# Patient Record
Sex: FEMALE | Race: WHITE | Employment: OTHER | URBAN - METROPOLITAN AREA
[De-identification: names, ages, dates, MRNs, and addresses within clinical notes are randomized per-mention and may not be internally consistent; named-entity substitution may affect disease eponyms.]

---

## 2017-01-20 ENCOUNTER — TELEPHONE (OUTPATIENT)
Dept: FAMILY MEDICINE | Facility: OTHER | Age: 77
End: 2017-01-20

## 2017-01-20 DIAGNOSIS — G89.29 OTHER CHRONIC PAIN: Primary | ICD-10-CM

## 2017-01-20 RX ORDER — OXYCODONE AND ACETAMINOPHEN 7.5; 325 MG/1; MG/1
1 TABLET ORAL EVERY 6 HOURS PRN
Qty: 120 TABLET | Refills: 0 | Status: SHIPPED | OUTPATIENT
Start: 2017-03-28 | End: 2017-04-21

## 2017-01-20 RX ORDER — OXYCODONE AND ACETAMINOPHEN 7.5; 325 MG/1; MG/1
1 TABLET ORAL EVERY 6 HOURS PRN
Qty: 120 TABLET | Refills: 0 | Status: SHIPPED | OUTPATIENT
Start: 2017-02-25 | End: 2017-03-27

## 2017-01-20 RX ORDER — MORPHINE SULFATE 30 MG/1
30 TABLET, FILM COATED, EXTENDED RELEASE ORAL 2 TIMES DAILY
Qty: 60 TABLET | Refills: 0 | Status: SHIPPED | OUTPATIENT
Start: 2017-02-25 | End: 2017-03-27

## 2017-01-20 RX ORDER — MORPHINE SULFATE 30 MG/1
30 TABLET, FILM COATED, EXTENDED RELEASE ORAL 2 TIMES DAILY
Qty: 60 TABLET | Refills: 0 | Status: SHIPPED | OUTPATIENT
Start: 2017-03-28 | End: 2017-04-21

## 2017-01-20 RX ORDER — MORPHINE SULFATE 30 MG/1
30 TABLET, FILM COATED, EXTENDED RELEASE ORAL 2 TIMES DAILY
Qty: 60 TABLET | Refills: 0 | Status: SHIPPED | OUTPATIENT
Start: 2017-01-25 | End: 2017-02-24

## 2017-01-20 RX ORDER — OXYCODONE AND ACETAMINOPHEN 7.5; 325 MG/1; MG/1
1 TABLET ORAL EVERY 6 HOURS PRN
Qty: 120 TABLET | Refills: 0 | Status: SHIPPED | OUTPATIENT
Start: 2017-01-25 | End: 2017-02-24

## 2017-01-20 NOTE — TELEPHONE ENCOUNTER
Pt was told to call us mid January to give us her winter address so we can mail out her rx to her.  She is at [address] [place] [address].  She needs her morphine sulfate and oxycodone 3 each sent to her. Please call her once this has been mailed so she can keep in eye out for it.

## 2017-01-20 NOTE — TELEPHONE ENCOUNTER
1. Other chronic pain  Secondary to severe scoliosis, has been following with spine specialist, surgery is not recommended, just pain control.  She has been stable on her current narcotic regimen for some time, feels it helps her get through her normal daily activities, no escalating usage.  3 months given.  If she gets home this winter for a few days, she may see me for refill, otherwise, may mail scripts to her until she returns this spring.  Have also asked to her to see if [place] will take out of state provider scripts as some states have stopped accepting them.  If this is the case, she will need a new provider in [place].    Morphine   Last Written Prescription Date:  12-  Last Fill Quantity: 60,   # refills: 0  Last Office Visit with [facility] or [facility] prescribing provider: 10-  Future Office visit:       Routing refill request to provider for review/approval because:  Drug not on the [facility] or [facility] refill protocol or controlled substance    Oxycodone  Last Written Prescription Date:  12-  Last Fill Quantity: 120,   # refills: 0  Last Office Visit with [facility] or [facility] prescribing provider: 10-  Future Office visit:       Routing refill request to provider for review/approval because:  Drug not on the [facility] or [facility] refill protocol or controlled substance

## 2017-01-20 NOTE — TELEPHONE ENCOUNTER
Spoke to patient and informed her that rx will be mailed out to her on Monday 1/23/17 and I confirmed address in [place]- and is correct.

## 2017-02-05 DIAGNOSIS — I10 HYPERTENSION GOAL BP (BLOOD PRESSURE) < 140/90: Primary | ICD-10-CM

## 2017-02-06 NOTE — TELEPHONE ENCOUNTER
Amlodipine 10 MG      Last Written Prescription Date: 05/18/2016  Last Fill Quantity: 90, # refills: 3  Last Office Visit with [facility] or [facility] prescribing provider: 10/25/2016       POTASSIUM   Date Value Ref Range Status   10/25/2016 3.7 3.4 - 5.3 mmol/L Final     CREATININE   Date Value Ref Range Status   10/25/2016 0.74 0.52 - 1.04 mg/dL Final   04/10/2008 50  Final     Comment:     Unit: mg/dL   04/10/2008 50  Final     Comment:     Unit: mg/dL     BP Readings from Last 3 Encounters:   10/25/16 146/60   05/18/16 108/60   10/14/15 120/58

## 2017-02-07 RX ORDER — AMLODIPINE BESYLATE 10 MG/1
TABLET ORAL
Qty: 90 TABLET | Refills: 0 | OUTPATIENT
Start: 2017-02-07

## 2017-02-07 NOTE — TELEPHONE ENCOUNTER
Rx was sent 5/18/2016 for 3 months and 3 refills. Patient should have medication through 5/18/2017  Pharmacy notified via E-prescribe refusal  Ariane Uribe RN

## 2017-03-31 DIAGNOSIS — L30.4 INTERTRIGO: ICD-10-CM

## 2017-03-31 NOTE — TELEPHONE ENCOUNTER
nystatin (MYCOSTATIN) 202590 UNIT/GM POWD      Last Written Prescription Date: 10/14/2015  Last Fill Quantity: 120g,  # refills: 3   Last Office Visit with [facility] or [facility] prescribing provider: 10/25/2016                                             nystatin (MYCOSTATIN) cream      Last Written Prescription Date: 10/14/2015  Last Fill Quantity: 60g,  # refills: 5   Last Office Visit with [facility] or [facility] prescribing provider: 10/25/2016

## 2017-04-04 RX ORDER — NYSTATIN 100000 [USP'U]/G
POWDER TOPICAL
Qty: 120 G | Refills: 0 | Status: SHIPPED | OUTPATIENT
Start: 2017-04-04 | End: 2017-06-13

## 2017-04-04 RX ORDER — NYSTATIN 100000 U/G
CREAM TOPICAL
Qty: 60 G | Refills: 0 | Status: SHIPPED | OUTPATIENT
Start: 2017-04-04 | End: 2017-06-13

## 2017-04-04 NOTE — TELEPHONE ENCOUNTER
Medication(s) reviewed and approved. Rx. was faxed to the designated pharmacy.      Please notify that this has been done.      Please close the encounter when finished with it.     Anastasia Anglin PA-C

## 2017-04-04 NOTE — TELEPHONE ENCOUNTER
Routing refill request to provider for review/approval because:  Script is from 10/2015    Smiley Shannon, RN, BSN

## 2017-04-12 ENCOUNTER — TELEPHONE (OUTPATIENT)
Dept: FAMILY MEDICINE | Facility: OTHER | Age: 77
End: 2017-04-12

## 2017-04-12 NOTE — TELEPHONE ENCOUNTER
Reason for call:  Form  Reason for Call:  Form, our goal is to have forms completed with 72 hours, however, some forms may require a visit or additional information.    Type of letter, form or note:  medical    Who is the form from?: [facility] pharmacy (if other please explain)    Where did the form come from: form was faxed in    What clinic location was the form placed at?: [facility] - [phone]    Where the form was placed: 's Box    What number is listed as a contact on the form?: [phone]       Additional comments: fill out fax back    Call taken on 4/12/2017 at 9:58 AM by Kendra Villafana

## 2017-04-17 ENCOUNTER — TELEPHONE (OUTPATIENT)
Dept: FAMILY MEDICINE | Facility: OTHER | Age: 77
End: 2017-04-17

## 2017-04-17 NOTE — PROGRESS NOTES
SUBJECTIVE:                                                    Katherin Jonas is a 76 year old female who presents to clinic today for the following health issues:    HPI    Foot swelling     Onset: 1 month--5/2016 had swelling issues and amlodipine was decreased to 5 mg.  Had been doing well over winter until last month when flew home and now left leg is more swollen.  Swelling occurs in the morning and goes down during the day.  Went to Urgent Care and had normal US 4/6/2017.  Sleeps in her recliner, finds that swelling is worse when she sleeps in the recliner than if she sleeps in bed.    Description:   Location: left foot swelling  Character: swelling    Intensity: mild, moderate    Progression of Symptoms: same    Accompanying Signs & Symptoms:  Other symptoms: none   History:   Previous similar pain: YES      Precipitating factors:   Trauma or overuse: no     Alleviating factors:  Improved by: elevating legs       Therapies Tried and outcome: none    Hypertension Follow-up      Outpatient blood pressures are not being checked.    Low Salt Diet: not monitoring salt     Chronic Kidney Disease Follow-up      Current NSAID use?  No      Chronic Pain Follow-Up       Type / Location of Pain: back, feet  Analgesia/pain control:       Recent changes:  worse      Overall control: Comfortably manageable  Activity level/function:      Daily activities:  Can do most things most days, with some rest    Work:  not applicable  Adverse effects:  No  Adherance    Taking medication as directed?  Yes    Participating in other treatments: yes  Risk Factors:    Sleep:  Good    Mood/anxiety:  worsened    Recent family or social stressors:   is having health issues    Other aggravating factors: none  PHQ-9 SCORE 4/29/2015 5/18/2016 10/25/2016   Total Score 4 - -   Total Score - 6 0     RACHID-7 SCORE 4/29/2015 5/18/2016 10/25/2016   Total Score 2 - -   Total Score - 3 3     Encounter-Level CSA - 05/18/2016:                  "Controlled Substance Agreement - Scan on 2016  2:21 PM : CONTROLLED SUBSTANCE AGREEMENT (below)                 Problem list and histories reviewed & adjusted, as indicated.  Additional history: as documented    Patient Active Problem List   Diagnosis     Esophageal reflux     Carotid atherosclerosis     CKD (chronic kidney disease) stage 3, GFR 30-59 ml/min     Insomnia     Mild major depression (H)     Advanced directives, counseling/discussion     Hypertension goal BP (blood pressure) < 140/90     Hyperlipidemia, unspecified     Scoliosis     Osteopenia     Postherpetic neuralgia     Other chronic pain     Past Surgical History:   Procedure Laterality Date     C FULL ROUT OBSTE CARE, DELIV  1964     C FULL ROUT OBSTE CARE, DELIV       C FULL ROUT OBSTE CARE, DELIV  1970     C TOTAL KNEE ARTHROPLASTY  1997    left     C TOTAL KNEE ARTHROPLASTY  2005    right     C TREAT ECTOPIC PREG,RMV TUBE/OVARY  1967    left     COLONOSCOPY  11    [facility]     HC REPAIR OF NASAL SEPTUM         Social History   Substance Use Topics     Smoking status: Former Smoker     Quit date: 1979     Smokeless tobacco: Never Used      Comment: no smokers in household     Alcohol use No      Comment: none since      Family History   Problem Relation Age of Onset     CANCER Daughter      Hypertension Mother            ROS:  C: NEGATIVE for fever, chills, change in weight  E/M: NEGATIVE for ear, mouth and throat problems  R: NEGATIVE for significant cough or SOB  CV: NEGATIVE for chest pain, palpitations or peripheral edema    OBJECTIVE:                                                    /56  Pulse 60  Temp 98.5  F (36.9  C) (Temporal)  Resp 16  Ht 4' 10.07\" (1.475 m)  Wt 138 lb (62.6 kg)  LMP 2011  BMI 28.77 kg/m2  Body mass index is 28.77 kg/(m^2).  Gen: no apparent distress  Chest: clear to auscultation without wheeze, rale or rhonchi  Cor: " regular rate and rhythm without murmur  ABDOMEN: soft, nontender, no masses and bowel sounds normal  Ext: warm and dry without edema  Psych: Alert and oriented times 3; coherent speech, normal   rate and volume, able to articulate logical thoughts, able   to abstract reason, no tangential thoughts, no hallucinations   or delusions  Her affect is neutral.  Back:  Significant scoliosis noted        ASSESSMENT/PLAN:                                                      1. Hypertension goal BP (blood pressure) < 140/90  Well controlled, will return for fasting labs.    - **Lipid panel reflex to direct LDL FUTURE anytime; Future  - **Comprehensive metabolic panel FUTURE anytime; Future  - **CBC with platelets FUTURE anytime; Future  - **Albumin Random Urine Quant FUTURE anytime; Future    2. CKD (chronic kidney disease) stage 3, GFR 30-59 ml/min  BP controlled, avoid NSAIDs.    - **Lipid panel reflex to direct LDL FUTURE anytime; Future  - **Comprehensive metabolic panel FUTURE anytime; Future  - **CBC with platelets FUTURE anytime; Future  - **Albumin Random Urine Quant FUTURE anytime; Future    3. Hyperlipidemia, unspecified  Remains on statin.    - **Lipid panel reflex to direct LDL FUTURE anytime; Future  - **Comprehensive metabolic panel FUTURE anytime; Future    4. Other chronic pain  Severe scoliosis, back specialist does not feel she would benefit from further surgery.  She has been on chronic narcotics for many years, had been following with pain clinic and was on high doses of Avinza (120 mg daily) along with Percocet, eventually transferred care to PCP at this clinic as her pain meds were stable and these were continued.  In 2010 it was attempted to decrease her Avinza to 90 mg, but this was then increased back to 120 mg after a couple months.  Later in 2011 she was able to get her Avinza decreased to 75 mg daily, but her Percocet was increased from three times daily to four times daily and then up to 7.5 mg  four times daily.  Due to insurance coverage, Avinza was no longer covered and she was changed to MS Contin 30 mg twice daily in 2013 and her Percocet was increased to 7.5 mg 4 times daily.  She has remained on these doses since then, her pain has been stable, hs e has no evidence of misuse of the medications.  She is due for UDS today.  3 months of scripts were provided, she will follow up in 3 months.    - Drug Abuse Screen Panel 13, Urine (Pain Care Package); Future  - oxyCODONE-acetaminophen (PERCOCET) 7.5-325 MG per tablet; Take 1 tablet by mouth every 6 hours as needed for pain  Dispense: 120 tablet; Refill: 0  - morphine (MS CONTIN) 30 MG 12 hr tablet; Take 1 tablet (30 mg) by mouth 2 times daily  Dispense: 60 tablet; Refill: 0    5. Insomnia, unspecified type  She feels her sleep is doing well on the 3 mg of Lunesta.    - eszopiclone (LUNESTA) 3 MG tablet; Take 1 tablet (3 mg) by mouth nightly as needed for sleep  Dispense: 90 tablet; Refill: 1    6. Essential hypertension with goal blood pressure less than 140/90  Well controlled.  Her edema is absent today.  Encouraged elevating feet and/or wearing compression stockings.    - amLODIPine (NORVASC) 5 MG tablet; Take 1 tablet (5 mg) by mouth every morning  Dispense: 90 tablet; Refill: 3    Silvia Matthews MD  [facility]

## 2017-04-17 NOTE — TELEPHONE ENCOUNTER
"Pt is on the schedule for Friday with TC. \"Left foot swelling up\" is what the appt note says. Please review and call for more info if appropriate.   "

## 2017-04-18 NOTE — TELEPHONE ENCOUNTER
Spoke with patient.  Foot swelling started since last OV.  Change BP meds a little bit.  Worse after her trip from [place] back the 29th, has been sleeping in chair with feet down.  Better looking today.  Was seen in [facility]: US was done  No DVT seen.     Will keep appointment for Friday for follow up.  Abhishek Jones RN

## 2017-04-21 ENCOUNTER — OFFICE VISIT (OUTPATIENT)
Dept: FAMILY MEDICINE | Facility: OTHER | Age: 77
End: 2017-04-21
Payer: COMMERCIAL

## 2017-04-21 VITALS
HEIGHT: 58 IN | DIASTOLIC BLOOD PRESSURE: 56 MMHG | RESPIRATION RATE: 16 BRPM | WEIGHT: 138 LBS | SYSTOLIC BLOOD PRESSURE: 124 MMHG | TEMPERATURE: 98.5 F | HEART RATE: 60 BPM | BODY MASS INDEX: 28.97 KG/M2

## 2017-04-21 DIAGNOSIS — I10 HYPERTENSION GOAL BP (BLOOD PRESSURE) < 140/90: Primary | ICD-10-CM

## 2017-04-21 DIAGNOSIS — G89.29 OTHER CHRONIC PAIN: ICD-10-CM

## 2017-04-21 DIAGNOSIS — I10 ESSENTIAL HYPERTENSION WITH GOAL BLOOD PRESSURE LESS THAN 140/90: ICD-10-CM

## 2017-04-21 DIAGNOSIS — G47.00 INSOMNIA, UNSPECIFIED TYPE: ICD-10-CM

## 2017-04-21 DIAGNOSIS — E78.5 HYPERLIPIDEMIA, UNSPECIFIED: ICD-10-CM

## 2017-04-21 DIAGNOSIS — N18.30 CKD (CHRONIC KIDNEY DISEASE) STAGE 3, GFR 30-59 ML/MIN (H): ICD-10-CM

## 2017-04-21 PROCEDURE — 99214 OFFICE O/P EST MOD 30 MIN: CPT | Performed by: FAMILY MEDICINE

## 2017-04-21 RX ORDER — ESZOPICLONE 3 MG/1
3 TABLET, FILM COATED ORAL
Qty: 90 TABLET | Refills: 1 | Status: SHIPPED | OUTPATIENT
Start: 2017-04-21 | End: 2017-08-15

## 2017-04-21 RX ORDER — AMLODIPINE BESYLATE 5 MG/1
5 TABLET ORAL EVERY MORNING
Qty: 90 TABLET | Refills: 3 | Status: SHIPPED | OUTPATIENT
Start: 2017-04-21 | End: 2018-01-19

## 2017-04-21 RX ORDER — OXYCODONE AND ACETAMINOPHEN 7.5; 325 MG/1; MG/1
1 TABLET ORAL EVERY 6 HOURS PRN
Qty: 120 TABLET | Refills: 0 | Status: SHIPPED | OUTPATIENT
Start: 2017-04-21 | End: 2017-04-21

## 2017-04-21 RX ORDER — MORPHINE SULFATE 30 MG/1
30 TABLET, FILM COATED, EXTENDED RELEASE ORAL 2 TIMES DAILY
Qty: 60 TABLET | Refills: 0 | Status: SHIPPED | OUTPATIENT
Start: 2017-06-21 | End: 2017-06-13

## 2017-04-21 RX ORDER — MORPHINE SULFATE 30 MG/1
30 TABLET, FILM COATED, EXTENDED RELEASE ORAL 2 TIMES DAILY
Qty: 60 TABLET | Refills: 0 | Status: SHIPPED | OUTPATIENT
Start: 2017-05-21 | End: 2017-04-21

## 2017-04-21 RX ORDER — OXYCODONE AND ACETAMINOPHEN 7.5; 325 MG/1; MG/1
1 TABLET ORAL EVERY 6 HOURS PRN
Qty: 120 TABLET | Refills: 0 | Status: SHIPPED | OUTPATIENT
Start: 2017-05-21 | End: 2017-04-21

## 2017-04-21 RX ORDER — MORPHINE SULFATE 30 MG/1
30 TABLET, FILM COATED, EXTENDED RELEASE ORAL 2 TIMES DAILY
Qty: 60 TABLET | Refills: 0 | Status: SHIPPED | OUTPATIENT
Start: 2017-04-21 | End: 2017-04-21

## 2017-04-21 RX ORDER — OXYCODONE AND ACETAMINOPHEN 7.5; 325 MG/1; MG/1
1 TABLET ORAL EVERY 6 HOURS PRN
Qty: 120 TABLET | Refills: 0 | Status: SHIPPED | OUTPATIENT
Start: 2017-06-21 | End: 2017-06-13

## 2017-04-21 ASSESSMENT — ANXIETY QUESTIONNAIRES
5. BEING SO RESTLESS THAT IT IS HARD TO SIT STILL: NOT AT ALL
GAD7 TOTAL SCORE: 4
7. FEELING AFRAID AS IF SOMETHING AWFUL MIGHT HAPPEN: NOT AT ALL
2. NOT BEING ABLE TO STOP OR CONTROL WORRYING: SEVERAL DAYS
3. WORRYING TOO MUCH ABOUT DIFFERENT THINGS: SEVERAL DAYS
IF YOU CHECKED OFF ANY PROBLEMS ON THIS QUESTIONNAIRE, HOW DIFFICULT HAVE THESE PROBLEMS MADE IT FOR YOU TO DO YOUR WORK, TAKE CARE OF THINGS AT HOME, OR GET ALONG WITH OTHER PEOPLE: SOMEWHAT DIFFICULT
6. BECOMING EASILY ANNOYED OR IRRITABLE: NOT AT ALL
1. FEELING NERVOUS, ANXIOUS, OR ON EDGE: SEVERAL DAYS

## 2017-04-21 ASSESSMENT — PATIENT HEALTH QUESTIONNAIRE - PHQ9: 5. POOR APPETITE OR OVEREATING: SEVERAL DAYS

## 2017-04-21 NOTE — LETTER
My Depression Action Plan  Name: Katherin Jonas   Date of Birth 1940  Date: 4/17/2017    My doctor: Silvia Matthews   My clinic: [address]  [facility] [address] [phone]          GREEN    ZONE   Good Control    What it looks like:     Things are going generally well. You have normal up s and down s. You may even feel depressed from time to time, but bad moods usually last less than a day.   What you need to do:  1. Continue to care for yourself (see self care plan)  2. Check your depression survival kit and update it as needed  3. Follow your physician s recommendations including any medication.  4. Do not stop taking medication unless you consult with your physician first.           YELLOW         ZONE Getting Worse    What it looks like:     Depression is starting to interfere with your life.     It may be hard to get out of bed; you may be starting to isolate yourself from others.    Symptoms of depression are starting to last most all day and this has happened for several days.     You may have suicidal thoughts but they are not constant.   What you need to do:     1. Call your care team, your response to treatment will improve if you keep your care team informed of your progress. Yellow periods are signs an adjustment may need to be made.     2. Continue your self-care, even if you have to fake it!    3. Talk to someone in your support network    4. Open up your depression survival kit           RED    ZONE Medical Alert - Get Help    What it looks like:     Depression is seriously interfering with your life.     You may experience these or other symptoms: You can t get out of bed most days, can t work or engage in other necessary activities, you have trouble taking care of basic hygiene, or basic responsibilities, thoughts of suicide or death that will not go away, self-injurious behavior.     What you need to do:  1. Call your care  team and request a same-day appointment. If they are not available (weekends or after hours) call your local crisis line, emergency room or 911.      Electronically signed by: Rocio Raymundo, April 17, 2017    Depression Self Care Plan / Survival Kit    Self-Care for Depression  Here s the deal. Your body and mind are really not as separate as most people think.  What you do and think affects how you feel and how you feel influences what you do and think. This means if you do things that people who feel good do, it will help you feel better.  Sometimes this is all it takes.  There is also a place for medication and therapy depending on how severe your depression is, so be sure to consult with your medical provider and/ or Behavioral Health Consultant if your symptoms are worsening or not improving.     In order to better manage my stress, I will:    Exercise  Get some form of exercise, every day. This will help reduce pain and release endorphins, the  feel good  chemicals in your brain. This is almost as good as taking antidepressants!  This is not the same as joining a gym and then never going! (they count on that by the way ) It can be as simple as just going for a walk or doing some gardening, anything that will get you moving.      Hygiene   Maintain good hygiene (Get out of bed in the morning, Make your bed, Brush your teeth, Take a shower, and Get dressed like you were going to work, even if you are unemployed).  If your clothes don't fit try to get ones that do.    Diet  I will strive to eat foods that are good for me, drink plenty of water, and avoid excessive sugar, caffeine, alcohol, and other mood-altering substances.  Some foods that are helpful in depression are: complex carbohydrates, B vitamins, flaxseed, fish or fish oil, fresh fruits and vegetables.    Psychotherapy  I agree to participate in Individual Therapy (if recommended).    Medication  If prescribed medications, I agree to take them.  Missing  doses can result in serious side effects.  I understand that drinking alcohol, or other illicit drug use, may cause potential side effects.  I will not stop my medication abruptly without first discussing it with my provider.    Staying Connected With Others  I will stay in touch with my friends, family members, and my primary care provider/team.    Use your imagination  Be creative.  We all have a creative side; it doesn t matter if it s oil painting, sand castles, or mud pies! This will also kick up the endorphins.    Witness Beauty  (AKA stop and smell the roses) Take a look outside, even in mid-winter. Notice colors, textures. Watch the squirrels and birds.     Service to others  Be of service to others.  There is always someone else in need.  By helping others we can  get out of ourselves  and remember the really important things.  This also provides opportunities for practicing all the other parts of the program.    Humor  Laugh and be silly!  Adjust your TV habits for less news and crime-drama and more comedy.    Control your stress  Try breathing deep, massage therapy, biofeedback, and meditation. Find time to relax each day.     My support system    Clinic Contact:  Phone number:    Contact 1:  Phone number:    Contact 2:  Phone number:    Restorationist/:  Phone number:    Therapist:  Phone number:    Local crisis center:    Phone number:    Other community support:  Phone number:

## 2017-04-21 NOTE — MR AVS SNAPSHOT
After Visit Summary   4/21/2017    Katherin Jonas    MRN: [id number]           Patient Information     Date Of Birth          1940        Visit Information        Provider Department      4/21/2017 12:00 PM Silvia Matthews MD [facility]        Today's Diagnoses     Hypertension goal BP (blood pressure) < 140/90    -  1    CKD (chronic kidney disease) stage 3, GFR 30-59 ml/min        Hyperlipidemia, unspecified        Other chronic pain        Insomnia, unspecified type        Essential hypertension with goal blood pressure less than 140/90           Follow-ups after your visit        Follow-up notes from your care team     Return in about 3 months (around 7/21/2017).      Future tests that were ordered for you today     Open Future Orders        Priority Expected Expires Ordered    **Lipid panel reflex to direct LDL FUTURE anytime Routine 4/21/2017 4/21/2018 4/21/2017    **Comprehensive metabolic panel FUTURE anytime Routine 4/21/2017 4/21/2018 4/21/2017    **CBC with platelets FUTURE anytime Routine 4/21/2017 4/21/2018 4/21/2017    **Albumin Random Urine Quant FUTURE anytime Routine 4/21/2017 4/21/2018 4/21/2017    Drug Abuse Screen Panel 13, Urine (Pain Care Package) Routine  5/21/2017 4/21/2017            Who to contact     If you have questions or need follow up information about today's clinic visit or your schedule please contact [facility] directly at [phone].  Normal or non-critical lab and imaging results will be communicated to you by MyChart, letter or phone within 4 business days after the clinic has received the results. If you do not hear from us within 7 days, please contact the clinic through MyChart or phone. If you have a critical or abnormal lab result, we will notify you by phone as soon as possible.  Submit refill requests through [employer] or call your pharmacy and they will forward the refill request to us. Please allow 3 business  "days for your refill to be completed.          Additional Information About Your Visit        [facility] Information     [email or web address] lets you send messages to your doctor, view your test results, renew your prescriptions, schedule appointments and more. To sign up, go to [email or web address] . Click on \"Log in\" on the left side of the screen, which will take you to the Welcome page. Then click on \"Sign up Now\" on the right side of the page.     You will be asked to enter the access code listed below, as well as some personal information. Please follow the directions to create your username and password.     Your access code is: [email or web address]  Expires: 2017 12:47 PM     Your access code will  in 90 days. If you need help or a new code, please call your [place] clinic or [phone].        Care EveryWhere ID     This is your Care EveryWhere ID. This could be used by other organizations to access your [place] medical records  [id number]        Your Vitals Were     Pulse Temperature Respirations Height Last Period BMI (Body Mass Index)    60 98.5  F (36.9  C) (Temporal) 16 4' 10.07\" (1.475 m) 2011 28.77 kg/m2       Blood Pressure from Last 3 Encounters:   17 124/56   10/25/16 146/60   16 108/60    Weight from Last 3 Encounters:   17 138 lb (62.6 kg)   10/25/16 139 lb (63 kg)   16 146 lb 3.2 oz (66.3 kg)              We Performed the Following     DEPRESSION ACTION PLAN (DAP)          Today's Medication Changes          These changes are accurate as of: 17 12:47 PM.  If you have any questions, ask your nurse or doctor.               Start taking these medicines.        Dose/Directions    morphine 30 MG 12 hr tablet   Commonly known as:  MS CONTIN   Used for:  Other chronic pain        Dose:  30 mg   Start taking on:  2017   Take 1 tablet (30 mg) by mouth 2 times daily   Quantity:  60 tablet   Refills:  0       oxyCODONE-acetaminophen 7.5-325 MG per tablet   Commonly " known as:  PERCOCET   Used for:  Other chronic pain        Dose:  1 tablet   Start taking on:  6/21/2017   Take 1 tablet by mouth every 6 hours as needed for pain   Quantity:  120 tablet   Refills:  0         Stop taking these medicines if you haven't already. Please contact your care team if you have questions.     potassium chloride 1.33 MEQ/mL     ) Soln                Where to get your medicines      These medications were sent to [id number] - [place] [address] AT [facility] & [address] , [facility] [address]     Phone:  [phone]     amLODIPine 5 MG tablet         Some of these will need a paper prescription and others can be bought over the counter.  Ask your nurse if you have questions.     Bring a paper prescription for each of these medications     eszopiclone 3 MG tablet    morphine 30 MG 12 hr tablet    oxyCODONE-acetaminophen 7.5-325 MG per tablet                Primary Care Provider Office Phone # Fax #    Silvia Matthews [phone]       [facility] [address] [facility] [address]  [facility] [address]        Thank you!     Thank you for choosing [facility]  for your care. Our goal is always to provide you with excellent care. Hearing back from our patients is one way we can continue to improve our services. Please take a few minutes to complete the written survey that you may receive in the mail after your visit with us. Thank you!             Your Updated Medication List - Protect others around you: Learn how to safely use, store and throw away your medicines at www.disposemymeds.org.          This list is accurate as of: 4/21/17 12:47 PM.  Always use your most recent med list.                   Brand Name Dispense Instructions for use    ACE/ARB NOT PRESCRIBED (INTENTIONAL)      continuous prn. ACE & ARB not prescribed due to Other:BP controlled, no microalbuminuria       amLODIPine 5 MG tablet     NORVASC    90 tablet    Take 1 tablet (5 mg) by mouth every morning       aspirin 81 MG tablet      Take 1 tablet by mouth daily.       atorvastatin 10 MG tablet    LIPITOR    90 tablet    Take 1 tablet (10 mg) by mouth daily       CALCIUM CITRATE + D PO      1,200 mg daily       citalopram 20 MG tablet    celeXA    90 tablet    Take 1 tablet (20 mg) by mouth daily       eszopiclone 3 MG tablet    LUNESTA    90 tablet    Take 1 tablet (3 mg) by mouth nightly as needed for sleep       fluticasone-vilanterol 100-25 MCG/INH oral inhaler    BREO ELLIPTA     Inhale 1 puff into the lungs daily       gabapentin 300 MG capsule    NEURONTIN    360 capsule    TAKE ONE CAPSULE BY MOUTH FOUR TIMES A DAY       hydrochlorothiazide 25 MG tablet    HYDRODIURIL    90 tablet    Take 1 tablet (25 mg) by mouth every morning       lidocaine 5 % Patch    LIDODERM    30 patch    Apply up to 3 patches to painful area at once for up to 12 h within a 24 h period.  Remove after 12 hours.       Lutein 6 MG Tabs      Take  by mouth daily.       metoprolol 100 MG 24 hr tablet    TOPROL-XL    90 tablet    Take 1 tablet (100 mg) by mouth daily       morphine 30 MG 12 hr tablet   Start taking on:  6/21/2017    MS CONTIN    60 tablet    Take 1 tablet (30 mg) by mouth 2 times daily       MULTI-VITAMIN PO      once daily       * nystatin cream    MYCOSTATIN    60 g    APPLY TOPICALLY DAILY AS NEEDED(RASH UNDER BREAST AND IN GROIN)       * NYSTOP 153713 UNIT/GM Powd   Generic drug:  nystatin     120 g    APPLY 1 DOSE TOPICALLY THREE TIMES DAILY AS NEEDED       oxyCODONE-acetaminophen 7.5-325 MG per tablet   Start taking on:  6/21/2017    PERCOCET    120 tablet    Take 1 tablet by mouth every 6 hours as needed for pain       ranitidine 150 MG tablet    ZANTAC    90 tablet    Take 1 tablet (150 mg) by mouth daily       SENNA-GEN 8.6 MG tablet   Generic drug:  senna     120    2 TABLETS AT Twice daily       triamcinolone 0.1 % ointment    KENALOG    30 g     Apply sparingly to affected area three times daily for 14 days.       * Notice:  This list has 2 medication(s) that are the same as other medications prescribed for you. Read the directions carefully, and ask your doctor or other care provider to review them with you.

## 2017-04-21 NOTE — NURSING NOTE
"Chief Complaint   Patient presents with     Swelling     left foot     Panel Management     honoring choices, DAP, PHq9, MYCHART       Initial /56  Pulse 60  Temp 98.5  F (36.9  C) (Temporal)  Resp 16  Ht 4' 10.07\" (1.475 m)  Wt 138 lb (62.6 kg)  LMP 02/01/2011  BMI 28.77 kg/m2 Estimated body mass index is 28.77 kg/(m^2) as calculated from the following:    Height as of this encounter: 4' 10.07\" (1.475 m).    Weight as of this encounter: 138 lb (62.6 kg).  Medication Reconciliation: complete   Greer Sanz CMA      "

## 2017-04-22 ASSESSMENT — ANXIETY QUESTIONNAIRES: GAD7 TOTAL SCORE: 4

## 2017-04-22 ASSESSMENT — PATIENT HEALTH QUESTIONNAIRE - PHQ9: SUM OF ALL RESPONSES TO PHQ QUESTIONS 1-9: 2

## 2017-04-25 DIAGNOSIS — G89.29 OTHER CHRONIC PAIN: ICD-10-CM

## 2017-04-25 DIAGNOSIS — E78.5 HYPERLIPIDEMIA, UNSPECIFIED: ICD-10-CM

## 2017-04-25 DIAGNOSIS — I10 HYPERTENSION GOAL BP (BLOOD PRESSURE) < 140/90: ICD-10-CM

## 2017-04-25 DIAGNOSIS — N18.30 CKD (CHRONIC KIDNEY DISEASE) STAGE 3, GFR 30-59 ML/MIN (H): ICD-10-CM

## 2017-04-25 LAB
ALBUMIN SERPL-MCNC: 3.4 G/DL (ref 3.4–5)
ALP SERPL-CCNC: 46 U/L (ref 40–150)
ALT SERPL W P-5'-P-CCNC: 19 U/L (ref 0–50)
AMPHETAMINES UR QL: ABNORMAL NG/ML
ANION GAP SERPL CALCULATED.3IONS-SCNC: 9 MMOL/L (ref 3–14)
AST SERPL W P-5'-P-CCNC: 18 U/L (ref 0–45)
BARBITURATES UR QL SCN: ABNORMAL NG/ML
BENZODIAZ UR QL SCN: ABNORMAL NG/ML
BILIRUB SERPL-MCNC: 0.6 MG/DL (ref 0.2–1.3)
BUN SERPL-MCNC: 11 MG/DL (ref 7–30)
BUPRENORPHINE UR QL: ABNORMAL NG/ML
CALCIUM SERPL-MCNC: 9.2 MG/DL (ref 8.5–10.1)
CANNABINOIDS UR QL: ABNORMAL NG/ML
CHLORIDE SERPL-SCNC: 101 MMOL/L (ref 94–109)
CHOLEST SERPL-MCNC: 137 MG/DL
CO2 SERPL-SCNC: 33 MMOL/L (ref 20–32)
COCAINE UR QL SCN: ABNORMAL NG/ML
CREAT SERPL-MCNC: 0.69 MG/DL (ref 0.52–1.04)
CREAT UR-MCNC: 98 MG/DL
D-METHAMPHET UR QL: ABNORMAL NG/ML
ERYTHROCYTE [DISTWIDTH] IN BLOOD BY AUTOMATED COUNT: 13.7 % (ref 10–15)
GFR SERPL CREATININE-BSD FRML MDRD: 83 ML/MIN/1.7M2
GLUCOSE SERPL-MCNC: 104 MG/DL (ref 70–99)
HCT VFR BLD AUTO: 37.3 % (ref 35–47)
HDLC SERPL-MCNC: 75 MG/DL
HGB BLD-MCNC: 12.5 G/DL (ref 11.7–15.7)
LDLC SERPL CALC-MCNC: 48 MG/DL
MCH RBC QN AUTO: 30.7 PG (ref 26.5–33)
MCHC RBC AUTO-ENTMCNC: 33.5 G/DL (ref 31.5–36.5)
MCV RBC AUTO: 92 FL (ref 78–100)
METHADONE UR QL SCN: ABNORMAL NG/ML
MICROALBUMIN UR-MCNC: 8 MG/L
MICROALBUMIN/CREAT UR: 8.14 MG/G CR (ref 0–25)
NONHDLC SERPL-MCNC: 62 MG/DL
OPIATES UR QL SCN: ABNORMAL NG/ML
OXYCODONE UR QL SCN: ABNORMAL NG/ML
PCP UR QL SCN: ABNORMAL NG/ML
PLATELET # BLD AUTO: 203 10E9/L (ref 150–450)
POTASSIUM SERPL-SCNC: 3.6 MMOL/L (ref 3.4–5.3)
PROPOXYPH UR QL: ABNORMAL NG/ML
PROT SERPL-MCNC: 7.4 G/DL (ref 6.8–8.8)
RBC # BLD AUTO: 4.07 10E12/L (ref 3.8–5.2)
SODIUM SERPL-SCNC: 143 MMOL/L (ref 133–144)
TRICYCLICS UR QL SCN: ABNORMAL NG/ML
TRIGL SERPL-MCNC: 71 MG/DL
WBC # BLD AUTO: 4.6 10E9/L (ref 4–11)

## 2017-04-25 PROCEDURE — 80306 DRUG TEST PRSMV INSTRMNT: CPT | Performed by: FAMILY MEDICINE

## 2017-04-25 PROCEDURE — 80061 LIPID PANEL: CPT | Performed by: FAMILY MEDICINE

## 2017-04-25 PROCEDURE — 80053 COMPREHEN METABOLIC PANEL: CPT | Performed by: FAMILY MEDICINE

## 2017-04-25 PROCEDURE — 82043 UR ALBUMIN QUANTITATIVE: CPT | Performed by: FAMILY MEDICINE

## 2017-04-25 PROCEDURE — 85027 COMPLETE CBC AUTOMATED: CPT | Performed by: FAMILY MEDICINE

## 2017-04-25 PROCEDURE — 36415 COLL VENOUS BLD VENIPUNCTURE: CPT | Performed by: FAMILY MEDICINE

## 2017-05-10 ENCOUNTER — TELEPHONE (OUTPATIENT)
Dept: FAMILY MEDICINE | Facility: OTHER | Age: 77
End: 2017-05-10

## 2017-05-10 DIAGNOSIS — I10 HYPERTENSION GOAL BP (BLOOD PRESSURE) < 140/90: Primary | ICD-10-CM

## 2017-05-10 NOTE — TELEPHONE ENCOUNTER
Call patient:  Do you want patient to continue with her potassium?  If yes, please call [facility].

## 2017-05-10 NOTE — TELEPHONE ENCOUNTER
Will route to TC to review/advise. Patient's potassium level on 04/25 was 3.6.    Sanjuanita Fiore, CMA

## 2017-05-12 NOTE — TELEPHONE ENCOUNTER
Spoke with patient informed to not take potassium. Spoke with both pharmacy's in [place] they stated they will take care of it. Kaila Hinkle CMA ([facility])

## 2017-05-12 NOTE — TELEPHONE ENCOUNTER
She doesn't need potassium right now.  Let's recheck her potassium again in a month to make sure it is stable.

## 2017-05-12 NOTE — TELEPHONE ENCOUNTER
Patient is calling to check on the status of this request. Patient would like to here something today.    Thank you Yoselin

## 2017-06-08 NOTE — PROGRESS NOTES
SUBJECTIVE:                                                    Katherin Jonas is a 76 year old female who presents to clinic today for the following health issues:      HPI    Recheck of potassium level.  Has been off for a couple months, doesn't like the taste/hard to swallow.  She is on a diuretic and has chronic kidney disease.  Denies palpitations, numbness/tingling in her extremities, twitching.    Her back pain is stable, no escalating pain medication doses.  She also has intermittent flares of pain from prior shingles, feels that it is coming back, but no rash.  She found lidocaine patches helpful for this pain in the past and would like to try it again.    Problem list and histories reviewed & adjusted, as indicated.  Additional history: as documented    Patient Active Problem List   Diagnosis     Esophageal reflux     Carotid atherosclerosis     CKD (chronic kidney disease) stage 3, GFR 30-59 ml/min     Insomnia     Mild major depression (H)     Advanced directives, counseling/discussion     Hypertension goal BP (blood pressure) < 140/90     Hyperlipidemia, unspecified     Scoliosis     Osteopenia     Postherpetic neuralgia     Other chronic pain     Past Surgical History:   Procedure Laterality Date     C FULL ROUT OBSTE CARE, DELIV  1964     C FULL ROUT OBSTE CARE, DELIV       C FULL ROUT OBSTE CARE, DELIV  1970     C TOTAL KNEE ARTHROPLASTY  1997    left     C TOTAL KNEE ARTHROPLASTY  2005    right     C TREAT ECTOPIC PREG,RMV TUBE/OVARY  1967    left     COLONOSCOPY  11    [facility]     HC REPAIR OF NASAL SEPTUM         Social History   Substance Use Topics     Smoking status: Former Smoker     Quit date: 1979     Smokeless tobacco: Never Used      Comment: no smokers in household     Alcohol use No      Comment: none since 2006     Family History   Problem Relation Age of Onset     CANCER Daughter      Hypertension Mother             ROS:  C: NEGATIVE for fever, chills, change in weight  E/M: NEGATIVE for ear, mouth and throat problems  R: NEGATIVE for significant cough or SOB  CV: NEGATIVE for chest pain, palpitations or peripheral edema    OBJECTIVE:                                                    /60  Pulse 60  Temp 97.8  F (36.6  C) (Temporal)  Resp 16  Wt 136 lb (61.7 kg)  LMP 02/01/2011  BMI 28.35 kg/m2  Body mass index is 28.35 kg/(m^2).  Gen: no apparent distress  Back:  No rash.  Significant scoliosis.   Psych: Alert and oriented times 3; coherent speech, normal   rate and volume, able to articulate logical thoughts, able   to abstract reason, no tangential thoughts, no hallucinations   or delusions  Her affect is neutral.     ASSESSMENT/PLAN:                                                      1. Hypertension goal BP (blood pressure) < 140/90  Well controlled.    - Potassium    2. CKD (chronic kidney disease) stage 3, GFR 30-59 ml/min  Recheck potassium, if normal, will remain off potassium supplement.    - Potassium    3. Other chronic pain  Secondary to severe scoliosis, no further surgery recommended, also has some component of postherpetic neuralgia.  Has been on chronic narcotics for years in stable doses.  Refills given, will follow up at end of September.  Will try lidocaine patches as well.    - lidocaine (LIDODERM) 5 % Patch; Apply up to 3 patches to painful area at once for up to 12 h within a 24 h period.  Remove after 12 hours.  Dispense: 30 patch; Refill: 11  - morphine (MS CONTIN) 30 MG 12 hr tablet; Take 1 tablet (30 mg) by mouth 2 times daily  Dispense: 60 tablet; Refill: 0  - oxyCODONE-acetaminophen (PERCOCET) 7.5-325 MG per tablet; Take 1 tablet by mouth every 6 hours as needed for pain  Dispense: 120 tablet; Refill: 0    4. Intertrigo  Desires refill, feels it has been helpful.    - nystatin (NYSTOP) 523739 UNIT/GM POWD; APPLY 1 DOSE TOPICALLY THREE TIMES DAILY AS NEEDED  Dispense: 120 g;  Refill: 11  - nystatin (MYCOSTATIN) cream; APPLY TOPICALLY DAILY AS NEEDED(RASH UNDER BREAST AND IN GROIN)  Dispense: 60 g; Refill: 11    5. Postherpetic neuralgia  Will try lidocaine patches again.    - lidocaine (LIDODERM) 5 % Patch; Apply up to 3 patches to painful area at once for up to 12 h within a 24 h period.  Remove after 12 hours.  Dispense: 30 patch; Refill: 11    Silvia Matthews MD  [facility]

## 2017-06-13 ENCOUNTER — TELEPHONE (OUTPATIENT)
Dept: FAMILY MEDICINE | Facility: OTHER | Age: 77
End: 2017-06-13

## 2017-06-13 ENCOUNTER — OFFICE VISIT (OUTPATIENT)
Dept: FAMILY MEDICINE | Facility: OTHER | Age: 77
End: 2017-06-13
Payer: COMMERCIAL

## 2017-06-13 VITALS
DIASTOLIC BLOOD PRESSURE: 60 MMHG | WEIGHT: 136 LBS | BODY MASS INDEX: 28.35 KG/M2 | RESPIRATION RATE: 16 BRPM | TEMPERATURE: 97.8 F | SYSTOLIC BLOOD PRESSURE: 134 MMHG | HEART RATE: 60 BPM

## 2017-06-13 DIAGNOSIS — L30.4 INTERTRIGO: ICD-10-CM

## 2017-06-13 DIAGNOSIS — G89.29 OTHER CHRONIC PAIN: ICD-10-CM

## 2017-06-13 DIAGNOSIS — I10 HYPERTENSION GOAL BP (BLOOD PRESSURE) < 140/90: Primary | ICD-10-CM

## 2017-06-13 DIAGNOSIS — N18.30 CKD (CHRONIC KIDNEY DISEASE) STAGE 3, GFR 30-59 ML/MIN (H): ICD-10-CM

## 2017-06-13 DIAGNOSIS — B02.29 POSTHERPETIC NEURALGIA: ICD-10-CM

## 2017-06-13 LAB — POTASSIUM SERPL-SCNC: 3.9 MMOL/L (ref 3.4–5.3)

## 2017-06-13 PROCEDURE — 36415 COLL VENOUS BLD VENIPUNCTURE: CPT | Performed by: FAMILY MEDICINE

## 2017-06-13 PROCEDURE — 84132 ASSAY OF SERUM POTASSIUM: CPT | Performed by: FAMILY MEDICINE

## 2017-06-13 PROCEDURE — 99214 OFFICE O/P EST MOD 30 MIN: CPT | Performed by: FAMILY MEDICINE

## 2017-06-13 RX ORDER — LIDOCAINE 50 MG/G
PATCH TOPICAL
Qty: 30 PATCH | Refills: 11 | Status: SHIPPED | OUTPATIENT
Start: 2017-06-13 | End: 2020-04-24

## 2017-06-13 RX ORDER — OXYCODONE AND ACETAMINOPHEN 7.5; 325 MG/1; MG/1
1 TABLET ORAL EVERY 6 HOURS PRN
Qty: 120 TABLET | Refills: 0 | Status: SHIPPED | OUTPATIENT
Start: 2017-07-21 | End: 2017-06-13

## 2017-06-13 RX ORDER — NYSTATIN 100000 U/G
CREAM TOPICAL
Qty: 60 G | Refills: 11 | Status: SHIPPED | OUTPATIENT
Start: 2017-06-13 | End: 2018-10-12

## 2017-06-13 RX ORDER — MORPHINE SULFATE 30 MG/1
30 TABLET, FILM COATED, EXTENDED RELEASE ORAL 2 TIMES DAILY
Qty: 60 TABLET | Refills: 0 | Status: SHIPPED | OUTPATIENT
Start: 2017-08-20 | End: 2017-09-19

## 2017-06-13 RX ORDER — OXYCODONE AND ACETAMINOPHEN 7.5; 325 MG/1; MG/1
1 TABLET ORAL EVERY 6 HOURS PRN
Qty: 120 TABLET | Refills: 0 | Status: SHIPPED | OUTPATIENT
Start: 2017-08-20 | End: 2017-09-19

## 2017-06-13 RX ORDER — NYSTATIN 100000 [USP'U]/G
POWDER TOPICAL
Qty: 120 G | Refills: 11 | Status: SHIPPED | OUTPATIENT
Start: 2017-06-13 | End: 2018-10-12

## 2017-06-13 RX ORDER — MORPHINE SULFATE 30 MG/1
30 TABLET, FILM COATED, EXTENDED RELEASE ORAL 2 TIMES DAILY
Qty: 60 TABLET | Refills: 0 | Status: SHIPPED | OUTPATIENT
Start: 2017-07-21 | End: 2017-06-13

## 2017-06-13 NOTE — NURSING NOTE
"Chief Complaint   Patient presents with     RECHECK     discuss fasting labs      Panel Management     Fall risk assessment, Honoring choices       Initial /60  Pulse 60  Temp 97.8  F (36.6  C) (Temporal)  Resp 16  Wt 136 lb (61.7 kg)  LMP 02/01/2011  BMI 28.35 kg/m2 Estimated body mass index is 28.35 kg/(m^2) as calculated from the following:    Height as of 4/21/17: 4' 10.07\" (1.475 m).    Weight as of this encounter: 136 lb (61.7 kg).  Medication Reconciliation: complete   Greer Sanz, DRE    "

## 2017-06-13 NOTE — TELEPHONE ENCOUNTER
Fax from [facility].  They have Rx for Lidocaine.  Its is not covered.  Did you want to change or PA?      Ins [phone]  ID [id number]

## 2017-06-13 NOTE — MR AVS SNAPSHOT
"              After Visit Summary   2017    Katherin Jonas    MRN: [id number]           Patient Information     Date Of Birth          1940        Visit Information        Provider Department      2017 7:40 AM Silvia Matthews MD [facility]        Today's Diagnoses     Hypertension goal BP (blood pressure) < 140/90    -  1    CKD (chronic kidney disease) stage 3, GFR 30-59 ml/min        Other chronic pain        Intertrigo        Postherpetic neuralgia           Follow-ups after your visit        Who to contact     If you have questions or need follow up information about today's clinic visit or your schedule please contact [facility] directly at [phone].  Normal or non-critical lab and imaging results will be communicated to you by MyChart, letter or phone within 4 business days after the clinic has received the results. If you do not hear from us within 7 days, please contact the clinic through MyChart or phone. If you have a critical or abnormal lab result, we will notify you by phone as soon as possible.  Submit refill requests through [employer] or call your pharmacy and they will forward the refill request to us. Please allow 3 business days for your refill to be completed.          Additional Information About Your Visit        MyChart Information     [employer] lets you send messages to your doctor, view your test results, renew your prescriptions, schedule appointments and more. To sign up, go to [email or web address] . Click on \"Log in\" on the left side of the screen, which will take you to the Welcome page. Then click on \"Sign up Now\" on the right side of the page.     You will be asked to enter the access code listed below, as well as some personal information. Please follow the directions to create your username and password.     Your access code is: [email or web address]  Expires: 2017 12:47 PM     Your access code will  in 90 days. If you need " help or a new code, please call your [facility] clinic or [phone].        Care EveryWhere ID     This is your Care EveryWhere ID. This could be used by other organizations to access your [facility] medical records  [id number]        Your Vitals Were     Pulse Temperature Respirations Last Period BMI (Body Mass Index)       60 97.8  F (36.6  C) (Temporal) 16 02/01/2011 28.35 kg/m2        Blood Pressure from Last 3 Encounters:   06/13/17 134/60   04/21/17 124/56   10/25/16 146/60    Weight from Last 3 Encounters:   06/13/17 136 lb (61.7 kg)   04/21/17 138 lb (62.6 kg)   10/25/16 139 lb (63 kg)              We Performed the Following     Potassium          Today's Medication Changes          These changes are accurate as of: 6/13/17  8:09 AM.  If you have any questions, ask your nurse or doctor.               Start taking these medicines.        Dose/Directions    lidocaine 5 % Patch   Commonly known as:  LIDODERM   Used for:  Other chronic pain, Postherpetic neuralgia   Started by:  Silvia Matthews MD        Apply up to 3 patches to painful area at once for up to 12 h within a 24 h period.  Remove after 12 hours.   Quantity:  30 patch   Refills:  11       morphine 30 MG 12 hr tablet   Commonly known as:  MS CONTIN   Used for:  Other chronic pain   Started by:  Silvia Matthews MD        Dose:  30 mg   Start taking on:  8/20/2017   Take 1 tablet (30 mg) by mouth 2 times daily   Quantity:  60 tablet   Refills:  0       oxyCODONE-acetaminophen 7.5-325 MG per tablet   Commonly known as:  PERCOCET   Used for:  Other chronic pain   Started by:  Silvia Matthews MD        Dose:  1 tablet   Start taking on:  8/20/2017   Take 1 tablet by mouth every 6 hours as needed for pain   Quantity:  120 tablet   Refills:  0         These medicines have changed or have updated prescriptions.        Dose/Directions    * nystatin 756668 UNIT/GM Powd   Commonly known as:  NYSTOP   This may have changed:  See the new  instructions.   Used for:  Intertrigo   Changed by:  Silvia Matthews MD        APPLY 1 DOSE TOPICALLY THREE TIMES DAILY AS NEEDED   Quantity:  120 g   Refills:  11       * nystatin cream   Commonly known as:  MYCOSTATIN   This may have changed:  See the new instructions.   Used for:  Intertrigo   Changed by:  Silvia Matthews MD        APPLY TOPICALLY DAILY AS NEEDED(RASH UNDER BREAST AND IN GROIN)   Quantity:  60 g   Refills:  11       * Notice:  This list has 2 medication(s) that are the same as other medications prescribed for you. Read the directions carefully, and ask your doctor or other care provider to review them with you.         Where to get your medicines      [facility] [address] [place] [address] [address] AT [facility] & [address] , [facility] [address]     Phone:  [phone]     nystatin 012323 UNIT/GM Powd    nystatin cream         Some of these will need a paper prescription and others can be bought over the counter.  Ask your nurse if you have questions.     Bring a paper prescription for each of these medications     morphine 30 MG 12 hr tablet    oxyCODONE-acetaminophen 7.5-325 MG per tablet         Call your pharmacy to confirm that your medication is ready for pickup. It may take up to 24 hours for them to receive the prescription. If the prescription is not ready within 3 business days, please contact your clinic or your provider.     We will let you know when these medications are ready. If you don't hear back within 3 business days, please contact us.     lidocaine 5 % Patch                Primary Care Provider Office Phone # Fax #    Silvia Matthews [phone]       [facility] [address] [facility] [address]  [facility] [address]        Thank you!     Thank you for choosing [facility]  for your care. Our goal is always to provide you with excellent care. Hearing back  from our patients is one way we can continue to improve our services. Please take a few minutes to complete the written survey that you may receive in the mail after your visit with us. Thank you!             Your Updated Medication List - Protect others around you: Learn how to safely use, store and throw away your medicines at www.disposemymeds.org.          This list is accurate as of: 6/13/17  8:09 AM.  Always use your most recent med list.                   Brand Name Dispense Instructions for use    ACE/ARB NOT PRESCRIBED (INTENTIONAL)      continuous prn. ACE & ARB not prescribed due to Other:BP controlled, no microalbuminuria       amLODIPine 5 MG tablet    NORVASC    90 tablet    Take 1 tablet (5 mg) by mouth every morning       aspirin 81 MG tablet      Take 1 tablet by mouth daily.       atorvastatin 10 MG tablet    LIPITOR    90 tablet    Take 1 tablet (10 mg) by mouth daily       CALCIUM CITRATE + D PO      1,200 mg daily       citalopram 20 MG tablet    celeXA    90 tablet    Take 1 tablet (20 mg) by mouth daily       eszopiclone 3 MG tablet    LUNESTA    90 tablet    Take 1 tablet (3 mg) by mouth nightly as needed for sleep       fluticasone-vilanterol 100-25 MCG/INH oral inhaler    BREO ELLIPTA     Inhale 1 puff into the lungs daily       gabapentin 300 MG capsule    NEURONTIN    360 capsule    TAKE ONE CAPSULE BY MOUTH FOUR TIMES A DAY       hydrochlorothiazide 25 MG tablet    HYDRODIURIL    90 tablet    Take 1 tablet (25 mg) by mouth every morning       lidocaine 5 % Patch    LIDODERM    30 patch    Apply up to 3 patches to painful area at once for up to 12 h within a 24 h period.  Remove after 12 hours.       Lutein 6 MG Tabs      Take  by mouth daily.       metoprolol 100 MG 24 hr tablet    TOPROL-XL    90 tablet    Take 1 tablet (100 mg) by mouth daily       morphine 30 MG 12 hr tablet   Start taking on:  8/20/2017    MS CONTIN    60 tablet    Take 1 tablet (30 mg) by mouth 2 times daily        MULTI-VITAMIN PO      once daily       * nystatin 244917 UNIT/GM Powd    NYSTOP    120 g    APPLY 1 DOSE TOPICALLY THREE TIMES DAILY AS NEEDED       * nystatin cream    MYCOSTATIN    60 g    APPLY TOPICALLY DAILY AS NEEDED(RASH UNDER BREAST AND IN GROIN)       oxyCODONE-acetaminophen 7.5-325 MG per tablet   Start taking on:  8/20/2017    PERCOCET    120 tablet    Take 1 tablet by mouth every 6 hours as needed for pain       ranitidine 150 MG tablet    ZANTAC    90 tablet    Take 1 tablet (150 mg) by mouth daily       SENNA-GEN 8.6 MG tablet   Generic drug:  senna     120    2 TABLETS AT Twice daily       * Notice:  This list has 2 medication(s) that are the same as other medications prescribed for you. Read the directions carefully, and ask your doctor or other care provider to review them with you.

## 2017-06-22 ENCOUNTER — TRANSFERRED RECORDS (OUTPATIENT)
Dept: HEALTH INFORMATION MANAGEMENT | Facility: CLINIC | Age: 77
End: 2017-06-22

## 2017-07-25 ENCOUNTER — TRANSFERRED RECORDS (OUTPATIENT)
Dept: HEALTH INFORMATION MANAGEMENT | Facility: CLINIC | Age: 77
End: 2017-07-25

## 2017-08-15 ENCOUNTER — TELEPHONE (OUTPATIENT)
Dept: FAMILY MEDICINE | Facility: OTHER | Age: 77
End: 2017-08-15

## 2017-08-15 DIAGNOSIS — G47.00 INSOMNIA, UNSPECIFIED TYPE: ICD-10-CM

## 2017-08-16 NOTE — TELEPHONE ENCOUNTER
eszopiclone (LUNESTA) 3 MG tablet      Last Written Prescription Date:  4/21/2017  Last Fill Quantity: 90,   # refills: 1  Last Office Visit with [facility], [facility] or [facility] prescribing provider: 6/13/2017  Future Office visit:       Routing refill request to provider for review/approval because:  Drug not on the [facility], [facility] or [facility] refill protocol or controlled substance

## 2017-08-17 NOTE — TELEPHONE ENCOUNTER
eszopiclone (LUNESTA) 3 MG tablet  Routing refill request to provider for review/approval because:  Drug not on the [facility] refill protocol   Should have medication till 10/2017  Shanta Britt RN, BSN

## 2017-08-18 RX ORDER — ESZOPICLONE 3 MG/1
TABLET, FILM COATED ORAL
Qty: 90 TABLET | Refills: 0 | Status: SHIPPED | OUTPATIENT
Start: 2017-08-18 | End: 2017-09-19

## 2017-08-18 NOTE — TELEPHONE ENCOUNTER
Left message for patient to call back.  Please inform patient of message below.  Rx has been brought to  in [place] for .    Christy Hernandez, [facility]  August 18, 2017

## 2017-08-18 NOTE — TELEPHONE ENCOUNTER
TC patient. OK for refill when back in clinic tomorrow.     Koby Anglin PA-C  [facility]

## 2017-08-21 ENCOUNTER — TELEPHONE (OUTPATIENT)
Dept: FAMILY MEDICINE | Facility: OTHER | Age: 77
End: 2017-08-21

## 2017-08-21 NOTE — TELEPHONE ENCOUNTER
Ins needs a prior auth on Lunesta , per pt this works the best for her,  Ins is [employer] at [phone] and id#[id number]  Khushbu Mtz, Pharmacy [facility] [place] [phone]

## 2017-09-15 NOTE — PROGRESS NOTES
SUBJECTIVE:                                                    Katherin Jonas is a 76 year old female who presents to clinic today for the following health issues:    History of Present Illness   CKD:     NSAID use::  None  Depression & Anxiety Follow-up:     Depression/Anxiety:  Depression only    Status since last visit::  Stable    Other associated symptoms of depression::  None    Significant life event::  No    Current substance use::  None    Anxiety/Panic symptoms::  YES  Hyperlipidemia:     Low fat/chol diet rating::  Poor    Taking Statins::  YES    Side effects from hypolipidemia medication::  No muscle aches from Statin    Lipid Medications or Supplements::  None  Hypertension:     Outpatient blood pressures:  Are not being checked    Dietary sodium intake::  No added salt diet  Diet::  Regular (no restrictions)  Frequency of exercise::  None  Taking medications regularly::  Yes  Medication side effects::  None  Additional concerns today::  No      Chronic Pain Follow-Up       Type / Location of Pain: neck/back  Analgesia/pain control:       Recent changes:  same      Overall control: Tolerable with discomfort  Activity level/function:      Daily activities:  Can do most things most days, with some rest    Work:  not applicable  Adverse effects:  No  Adherance    Taking medication as directed?  Yes    Participating in other treatments: no -   Risk Factors:    Sleep:  Fair    Mood/anxiety:  controlled    Recent family or social stressors:  none noted    Other aggravating factors: repetitive activities - walking  PHQ-9 SCORE 5/18/2016 10/25/2016 4/21/2017   Total Score - - -   Total Score 6 0 2     RACHID-7 SCORE 5/18/2016 10/25/2016 4/21/2017   Total Score - - -   Total Score 3 3 4     Encounter-Level CSA - 05/18/2016:          Controlled Substance Agreement - Scan on 5/31/2016  2:21 PM : CONTROLLED SUBSTANCE AGREEMENT (below)                Problem list and histories reviewed & adjusted, as  indicated.  Additional history: as documented    Patient Active Problem List   Diagnosis     Esophageal reflux     Carotid atherosclerosis     CKD (chronic kidney disease) stage 3, GFR 30-59 ml/min     Insomnia     Mild major depression (H)     Advanced directives, counseling/discussion     Hypertension goal BP (blood pressure) < 140/90     Hyperlipidemia, unspecified     Scoliosis     Osteopenia     Postherpetic neuralgia     Other chronic pain     Past Surgical History:   Procedure Laterality Date     C FULL ROUT OBSTE CARE, DELIV  1964     C FULL ROUT OBSTE CARE, DELIV       C FULL ROUT OBSTE CARE, DELIV       C TOTAL KNEE ARTHROPLASTY  1997    left     C TOTAL KNEE ARTHROPLASTY  2005    right     C TREAT ECTOPIC PREG,RMV TUBE/OVARY  1967    left     COLONOSCOPY  11    [facility]     HC REPAIR OF NASAL SEPTUM         Social History   Substance Use Topics     Smoking status: Former Smoker     Quit date: 1979     Smokeless tobacco: Never Used      Comment: no smokers in household     Alcohol use No      Comment: none since      Family History   Problem Relation Age of Onset     CANCER Daughter      Hypertension Mother            ROS:  C: NEGATIVE for fever, chills, has had intentional loss in weight (trying to get to weight on her 's license which is listed as 127#)  E/M: NEGATIVE for ear, mouth and throat problems  R: NEGATIVE for significant cough or SOB  CV: NEGATIVE for chest pain, palpitations or peripheral edema  Psych:  Sold home in [place], closing on 10/1, did not suffer hurricane damage.  No plans to travel yet this winter, but she is hoping to get away for awhile.  Tried to sell home in [place], it wasn't selling, so they just took it off the market and she feels relief that she doesn't have to keep the house so clean, they may try to sell again next year.    OBJECTIVE:     /64 (BP Location: Right arm, Patient  "Position: Chair, Cuff Size: Adult Regular)  Pulse 50  Temp 98.1  F (36.7  C) (Temporal)  Resp 14  Ht 4' 10.7\" (1.491 m)  Wt 134 lb (60.8 kg)  LMP 02/01/2011  SpO2 96%  BMI 27.34 kg/m2  Body mass index is 27.34 kg/(m^2).  Gen: no apparent distress  NECK: no adenopathy, no asymmetry, no masses  Chest: clear to auscultation without wheeze, rale or rhonchi  Cor: regular rate and rhythm without murmur  ABDOMEN: soft, nontender, no masses and bowel sounds normal  Back:  Severe kyphoscoliosis noted  Ext: warm and dry without edema  Psych: Alert and oriented times 3; coherent speech, normal   rate and volume, able to articulate logical thoughts, able   to abstract reason, no tangential thoughts, no hallucinations   or delusions  Her affect is neutral       ASSESSMENT/PLAN:     1. Other chronic pain  Patient has severe kyphoscoliosis, back specialist does not want to perform surgery and has recommended pain management, patient has been on chronic narcotics for many years, has been on current dose for many years.  This has allowed her to continue to function in her day to day activities.  There has been no dose escalation.  There is no plan to taper off at this time.  3 months of refills given, follow up in December.    - morphine (MS CONTIN) 30 MG 12 hr tablet; Take 1 tablet (30 mg) by mouth 2 times daily  Dispense: 60 tablet; Refill: 0  - oxyCODONE-acetaminophen (PERCOCET) 7.5-325 MG per tablet; Take 1 tablet by mouth every 6 hours as needed for pain  Dispense: 120 tablet; Refill: 0    2. Essential hypertension with goal blood pressure less than 140/90  Well controlled.    - metoprolol (TOPROL-XL) 100 MG 24 hr tablet; Take 1 tablet (100 mg) by mouth daily  Dispense: 90 tablet; Refill: 3  - hydrochlorothiazide (HYDRODIURIL) 25 MG tablet; Take 1 tablet (25 mg) by mouth every morning  Dispense: 90 tablet; Refill: 3    3. Postherpetic neuralgia  Stable, refills given.    - gabapentin (NEURONTIN) 300 MG capsule; TAKE ONE " CAPSULE BY MOUTH FOUR TIMES A DAY  Dispense: 360 capsule; Refill: 3    4. Insomnia, unspecified type  Has failed trazodone, Ambien and Rozarem  Had been on 4 mg Lunesta since 2011,  2 mg not effective, but has been able to decrease to 3 mg over the last year.  Will continue on 3 mg at this time.    - eszopiclone (LUNESTA) 3 MG tablet; Take 1 tablet (3 mg) by mouth nightly as needed  Dispense: 90 tablet; Refill: 1    5. Major depressive disorder, recurrent episode, mild (H)  Stable.    - citalopram (CELEXA) 20 MG tablet; Take 1 tablet (20 mg) by mouth daily  Dispense: 90 tablet; Refill: 3    6. Hyperlipidemia, unspecified  Remains on statin.    - atorvastatin (LIPITOR) 10 MG tablet; Take 1 tablet (10 mg) by mouth daily  Dispense: 90 tablet; Refill: 3      Silvia Matthews MD  [facility]

## 2017-09-19 ENCOUNTER — OFFICE VISIT (OUTPATIENT)
Dept: FAMILY MEDICINE | Facility: OTHER | Age: 77
End: 2017-09-19
Payer: COMMERCIAL

## 2017-09-19 VITALS
DIASTOLIC BLOOD PRESSURE: 64 MMHG | HEIGHT: 59 IN | HEART RATE: 50 BPM | SYSTOLIC BLOOD PRESSURE: 130 MMHG | TEMPERATURE: 98.1 F | OXYGEN SATURATION: 96 % | WEIGHT: 134 LBS | BODY MASS INDEX: 27.01 KG/M2 | RESPIRATION RATE: 14 BRPM

## 2017-09-19 DIAGNOSIS — F33.0 MAJOR DEPRESSIVE DISORDER, RECURRENT EPISODE, MILD (H): ICD-10-CM

## 2017-09-19 DIAGNOSIS — G47.00 INSOMNIA, UNSPECIFIED TYPE: ICD-10-CM

## 2017-09-19 DIAGNOSIS — B02.29 POSTHERPETIC NEURALGIA: ICD-10-CM

## 2017-09-19 DIAGNOSIS — G89.29 OTHER CHRONIC PAIN: ICD-10-CM

## 2017-09-19 DIAGNOSIS — I10 ESSENTIAL HYPERTENSION WITH GOAL BLOOD PRESSURE LESS THAN 140/90: ICD-10-CM

## 2017-09-19 DIAGNOSIS — E78.5 HYPERLIPIDEMIA, UNSPECIFIED: ICD-10-CM

## 2017-09-19 PROCEDURE — 99214 OFFICE O/P EST MOD 30 MIN: CPT | Performed by: FAMILY MEDICINE

## 2017-09-19 RX ORDER — OXYCODONE AND ACETAMINOPHEN 7.5; 325 MG/1; MG/1
1 TABLET ORAL EVERY 6 HOURS PRN
Qty: 120 TABLET | Refills: 0 | Status: SHIPPED | OUTPATIENT
Start: 2017-10-19 | End: 2017-09-19

## 2017-09-19 RX ORDER — CITALOPRAM HYDROBROMIDE 20 MG/1
20 TABLET ORAL DAILY
Qty: 90 TABLET | Refills: 3 | Status: SHIPPED | OUTPATIENT
Start: 2017-09-19 | End: 2018-02-16

## 2017-09-19 RX ORDER — OXYCODONE AND ACETAMINOPHEN 7.5; 325 MG/1; MG/1
1 TABLET ORAL EVERY 6 HOURS PRN
Qty: 120 TABLET | Refills: 0 | Status: SHIPPED | OUTPATIENT
Start: 2017-09-19 | End: 2017-09-19

## 2017-09-19 RX ORDER — ESZOPICLONE 3 MG/1
3 TABLET, FILM COATED ORAL
Qty: 90 TABLET | Refills: 1 | Status: SHIPPED | OUTPATIENT
Start: 2017-09-19 | End: 2018-03-27

## 2017-09-19 RX ORDER — HYDROCHLOROTHIAZIDE 25 MG/1
25 TABLET ORAL EVERY MORNING
Qty: 90 TABLET | Refills: 3 | Status: SHIPPED | OUTPATIENT
Start: 2017-09-19 | End: 2018-02-16

## 2017-09-19 RX ORDER — MORPHINE SULFATE 30 MG/1
30 TABLET, FILM COATED, EXTENDED RELEASE ORAL 2 TIMES DAILY
Qty: 60 TABLET | Refills: 0 | Status: SHIPPED | OUTPATIENT
Start: 2017-09-19 | End: 2017-09-19

## 2017-09-19 RX ORDER — OXYCODONE AND ACETAMINOPHEN 7.5; 325 MG/1; MG/1
1 TABLET ORAL EVERY 6 HOURS PRN
Qty: 120 TABLET | Refills: 0 | Status: SHIPPED | OUTPATIENT
Start: 2017-11-19 | End: 2017-12-12

## 2017-09-19 RX ORDER — METOPROLOL SUCCINATE 100 MG/1
100 TABLET, EXTENDED RELEASE ORAL DAILY
Qty: 90 TABLET | Refills: 3 | Status: SHIPPED | OUTPATIENT
Start: 2017-09-19 | End: 2018-03-02

## 2017-09-19 RX ORDER — GABAPENTIN 300 MG/1
CAPSULE ORAL
Qty: 360 CAPSULE | Refills: 3 | Status: SHIPPED | OUTPATIENT
Start: 2017-09-19 | End: 2018-10-12

## 2017-09-19 RX ORDER — ATORVASTATIN CALCIUM 10 MG/1
10 TABLET, FILM COATED ORAL DAILY
Qty: 90 TABLET | Refills: 3 | Status: SHIPPED | OUTPATIENT
Start: 2017-09-19 | End: 2018-09-21

## 2017-09-19 RX ORDER — MORPHINE SULFATE 30 MG/1
30 TABLET, FILM COATED, EXTENDED RELEASE ORAL 2 TIMES DAILY
Qty: 60 TABLET | Refills: 0 | Status: SHIPPED | OUTPATIENT
Start: 2017-10-19 | End: 2017-09-19

## 2017-09-19 RX ORDER — MORPHINE SULFATE 30 MG/1
30 TABLET, FILM COATED, EXTENDED RELEASE ORAL 2 TIMES DAILY
Qty: 60 TABLET | Refills: 0 | Status: SHIPPED | OUTPATIENT
Start: 2017-11-19 | End: 2017-12-12

## 2017-09-19 NOTE — MR AVS SNAPSHOT
"              After Visit Summary   9/19/2017    Katherin Jonas    MRN: [id number]           Patient Information     Date Of Birth          1940        Visit Information        Provider Department      9/19/2017 9:40 AM Silvia Matthews MD [facility]        Today's Diagnoses     Other chronic pain        Essential hypertension with goal blood pressure less than 140/90        Postherpetic neuralgia        Insomnia, unspecified type        Major depressive disorder, recurrent episode, mild (H)        Hyperlipidemia, unspecified           Follow-ups after your visit        Who to contact     If you have questions or need follow up information about today's clinic visit or your schedule please contact [facility] directly at [phone].  Normal or non-critical lab and imaging results will be communicated to you by MyChart, letter or phone within 4 business days after the clinic has received the results. If you do not hear from us within 7 days, please contact the clinic through MyChart or phone. If you have a critical or abnormal lab result, we will notify you by phone as soon as possible.  Submit refill requests through [employer] or call your pharmacy and they will forward the refill request to us. Please allow 3 business days for your refill to be completed.          Additional Information About Your Visit        MyChart Information     [employer] lets you send messages to your doctor, view your test results, renew your prescriptions, schedule appointments and more. To sign up, go to [email or web address] . Click on \"Log in\" on the left side of the screen, which will take you to the Welcome page. Then click on \"Sign up Now\" on the right side of the page.     You will be asked to enter the access code listed below, as well as some personal information. Please follow the directions to create your username and password.     Your access code is: [email or web address]  Expires: 12/18/2017 " "10:02 AM     Your access code will  in 90 days. If you need help or a new code, please call your [facility] clinic or [phone].        Care EveryWhere ID     This is your Care EveryWhere ID. This could be used by other organizations to access your [facility] medical records  [id number]        Your Vitals Were     Pulse Temperature Respirations Height Last Period Pulse Oximetry    50 98.1  F (36.7  C) (Temporal) 14 4' 10.7\" (1.491 m) 2011 96%    BMI (Body Mass Index)                   27.34 kg/m2            Blood Pressure from Last 3 Encounters:   17 130/64   17 134/60   17 124/56    Weight from Last 3 Encounters:   17 134 lb (60.8 kg)   17 136 lb (61.7 kg)   17 138 lb (62.6 kg)              Today, you had the following     No orders found for display         Today's Medication Changes          These changes are accurate as of: 17 10:02 AM.  If you have any questions, ask your nurse or doctor.               Start taking these medicines.        Dose/Directions    morphine 30 MG 12 hr tablet   Commonly known as:  MS CONTIN   Used for:  Other chronic pain   Started by:  Silvia Matthews MD        Dose:  30 mg   Start taking on:  2017   Take 1 tablet (30 mg) by mouth 2 times daily   Quantity:  60 tablet   Refills:  0       oxyCODONE-acetaminophen 7.5-325 MG per tablet   Commonly known as:  PERCOCET   Used for:  Other chronic pain   Started by:  Silvia Matthews MD        Dose:  1 tablet   Start taking on:  2017   Take 1 tablet by mouth every 6 hours as needed for pain   Quantity:  120 tablet   Refills:  0         These medicines have changed or have updated prescriptions.        Dose/Directions    eszopiclone 3 MG tablet   Commonly known as:  LUNESTA   This may have changed:  See the new instructions.   Used for:  Insomnia, unspecified type   Changed by:  Silvia Matthews MD        Dose:  3 mg   Take 1 tablet (3 mg) by mouth nightly as " needed   Quantity:  90 tablet   Refills:  1            Where to get your medicines      These medications were sent to [id number] - [place] [address] AT [facility] & [address] , [facility] [place] [address]     Phone:  [phone]     atorvastatin 10 MG tablet    citalopram 20 MG tablet    gabapentin 300 MG capsule    hydrochlorothiazide 25 MG tablet    metoprolol 100 MG 24 hr tablet         Some of these will need a paper prescription and others can be bought over the counter.  Ask your nurse if you have questions.     Bring a paper prescription for each of these medications     eszopiclone 3 MG tablet    morphine 30 MG 12 hr tablet    oxyCODONE-acetaminophen 7.5-325 MG per tablet                Primary Care Provider Office Phone # Fax #    Silvia Matthews [phone]       [address] [place] [address] 100  [facility] [id number]        Equal Access to Services     [facility]: Hadii javier esposito hadasho Soaurea, waaxda luqadaha, qaybta kaalmada adeegyada, waxay fiona hayyane ramirez . So [facility] [phone].    ATENCIÓN: Si habla español, tiene a bernardo disposición servicios gratuitos de asistencia lingüística. Tom al [phone].    We comply with applicable federal civil rights laws and [place] laws. We do not discriminate on the basis of race, color, national origin, age, disability sex, sexual orientation or gender identity.            Thank you!     Thank you for choosing [facility]  for your care. Our goal is always to provide you with excellent care. Hearing back from our patients is one way we can continue to improve our services. Please take a few minutes to complete the written survey that you may receive in the mail after your visit with us. Thank you!             Your Updated Medication List - Protect others around you: Learn how to safely use, store and throw away your medicines at www.disposemymeds.org.           This list is accurate as of: 9/19/17 10:02 AM.  Always use your most recent med list.                   Brand Name Dispense Instructions for use Diagnosis    ACE/ARB NOT PRESCRIBED (INTENTIONAL)      continuous prn. ACE & ARB not prescribed due to Other:BP controlled, no microalbuminuria        amLODIPine 5 MG tablet    NORVASC    90 tablet    Take 1 tablet (5 mg) by mouth every morning    Essential hypertension with goal blood pressure less than 140/90       aspirin 81 MG tablet      Take 1 tablet by mouth daily.    Myalgia and myositis, unspecified       atorvastatin 10 MG tablet    LIPITOR    90 tablet    Take 1 tablet (10 mg) by mouth daily    Hyperlipidemia, unspecified       CALCIUM CITRATE + D PO      1,200 mg daily        citalopram 20 MG tablet    celeXA    90 tablet    Take 1 tablet (20 mg) by mouth daily    Major depressive disorder, recurrent episode, mild (H)       eszopiclone 3 MG tablet    LUNESTA    90 tablet    Take 1 tablet (3 mg) by mouth nightly as needed    Insomnia, unspecified type       fluticasone-vilanterol 100-25 MCG/INH oral inhaler    BREO ELLIPTA     Inhale 1 puff into the lungs daily        gabapentin 300 MG capsule    NEURONTIN    360 capsule    TAKE ONE CAPSULE BY MOUTH FOUR TIMES A DAY    Postherpetic neuralgia       hydrochlorothiazide 25 MG tablet    HYDRODIURIL    90 tablet    Take 1 tablet (25 mg) by mouth every morning    Essential hypertension with goal blood pressure less than 140/90       lidocaine 5 % Patch    LIDODERM    30 patch    Apply up to 3 patches to painful area at once for up to 12 h within a 24 h period.  Remove after 12 hours.    Other chronic pain, Postherpetic neuralgia       Lutein 6 MG Tabs      Take  by mouth daily.        metoprolol 100 MG 24 hr tablet    TOPROL-XL    90 tablet    Take 1 tablet (100 mg) by mouth daily    Essential hypertension with goal blood pressure less than 140/90       morphine 30 MG 12 hr tablet   Start taking on:  11/19/2017    MS  CONTIN    60 tablet    Take 1 tablet (30 mg) by mouth 2 times daily    Other chronic pain       MULTI-VITAMIN PO      once daily        * nystatin 300018 UNIT/GM Powd    NYSTOP    120 g    APPLY 1 DOSE TOPICALLY THREE TIMES DAILY AS NEEDED    Intertrigo       * nystatin cream    MYCOSTATIN    60 g    APPLY TOPICALLY DAILY AS NEEDED(RASH UNDER BREAST AND IN GROIN)    Intertrigo       oxyCODONE-acetaminophen 7.5-325 MG per tablet   Start taking on:  11/19/2017    PERCOCET    120 tablet    Take 1 tablet by mouth every 6 hours as needed for pain    Other chronic pain       ranitidine 150 MG tablet    ZANTAC    90 tablet    Take 1 tablet (150 mg) by mouth daily    Esophageal reflux       SENNA-GEN 8.6 MG tablet   Generic drug:  senna     120    2 TABLETS AT Twice daily    Unspecified constipation       * Notice:  This list has 2 medication(s) that are the same as other medications prescribed for you. Read the directions carefully, and ask your doctor or other care provider to review them with you.

## 2017-09-19 NOTE — NURSING NOTE
"Chief Complaint   Patient presents with     Recheck Medication     Panel Management     flu, honoring choices       Initial /64 (BP Location: Right arm, Patient Position: Chair, Cuff Size: Adult Regular)  Pulse 50  Temp 98.1  F (36.7  C) (Temporal)  Resp 14  Ht 4' 10.7\" (1.491 m)  Wt 134 lb (60.8 kg)  LMP 02/01/2011  SpO2 96%  BMI 27.34 kg/m2 Estimated body mass index is 27.34 kg/(m^2) as calculated from the following:    Height as of this encounter: 4' 10.7\" (1.491 m).    Weight as of this encounter: 134 lb (60.8 kg).  Medication Reconciliation: complete   Kirsten Jaffe CMA      "

## 2017-12-04 NOTE — PROGRESS NOTES
SUBJECTIVE:                                                    Katherin Jonas is a 77 year old female who presents to clinic today for the following health issues:    HPI    Medication Followup of Oxycodone and Morphine    Taking Medication as prescribed: yes    Side Effects:  Constipation     Medication Helping Symptoms:  yes     Chronic Pain Follow-Up       Type / Location of Pain: neck/back  Analgesia/pain control:       Recent changes:  same      Overall control: Tolerable with discomfort  Activity level/function:      Daily activities:  Able to do all daily activities    Work:  not applicable  Adverse effects:  No  Adherance    Taking medication as directed?  Yes    Participating in other treatments: yes  Risk Factors:    Sleep:  Good    Mood/anxiety:  worsened    Recent family or social stressors:  Money issues and recently sold house in [place] that they went to every year for winter. She is going to [place] for 3 weeks in January 24th.     3-4 percocet's per day, consistent with her past history.   MS Contin twice daily  Pain controlled with this.       Other aggravating factors: none  PHQ-9 SCORE 5/18/2016 10/25/2016 4/21/2017   Total Score - - -   Total Score 6 0 2     RACHID-7 SCORE 5/18/2016 10/25/2016 4/21/2017   Total Score - - -   Total Score 3 3 4     Encounter-Level CSA - 05/18/2016:          Controlled Substance Agreement - Scan on 5/31/2016  2:21 PM : CONTROLLED SUBSTANCE AGREEMENT (below)                Problem list and histories reviewed & adjusted, as indicated.  Additional history: as documented      Current Outpatient Prescriptions   Medication Sig Dispense Refill     [START ON 12/21/2017] morphine (MS CONTIN) 30 MG 12 hr tablet Take 1 tablet (30 mg) by mouth 2 times daily 60 tablet 0     [START ON 12/21/2017] oxyCODONE-acetaminophen (PERCOCET) 7.5-325 MG per tablet Take 1 tablet by mouth every 6 hours as needed for pain 120 tablet 0     metoprolol (TOPROL-XL) 100 MG 24 hr tablet Take 1  tablet (100 mg) by mouth daily 90 tablet 3     hydrochlorothiazide (HYDRODIURIL) 25 MG tablet Take 1 tablet (25 mg) by mouth every morning 90 tablet 3     gabapentin (NEURONTIN) 300 MG capsule TAKE ONE CAPSULE BY MOUTH FOUR TIMES A  capsule 3     eszopiclone (LUNESTA) 3 MG tablet Take 1 tablet (3 mg) by mouth nightly as needed 90 tablet 1     citalopram (CELEXA) 20 MG tablet Take 1 tablet (20 mg) by mouth daily 90 tablet 3     atorvastatin (LIPITOR) 10 MG tablet Take 1 tablet (10 mg) by mouth daily 90 tablet 3     nystatin (NYSTOP) 955453 UNIT/GM POWD APPLY 1 DOSE TOPICALLY THREE TIMES DAILY AS NEEDED 120 g 11     nystatin (MYCOSTATIN) cream APPLY TOPICALLY DAILY AS NEEDED(RASH UNDER BREAST AND IN GROIN) 60 g 11     lidocaine (LIDODERM) 5 % Patch Apply up to 3 patches to painful area at once for up to 12 h within a 24 h period.  Remove after 12 hours. 30 patch 11     amLODIPine (NORVASC) 5 MG tablet Take 1 tablet (5 mg) by mouth every morning 90 tablet 3     fluticasone - vilanterol (BREO ELLIPTA) 100-25 MCG/INH oral inhaler Inhale 1 puff into the lungs daily       ranitidine (ZANTAC) 150 MG tablet Take 1 tablet (150 mg) by mouth daily 90 tablet 3     ACE/ARB NOT PRESCRIBED, INTENTIONAL, continuous prn. ACE & ARB not prescribed due to Other:BP controlled, no microalbuminuria       Lutein 6 MG TABS Take  by mouth daily.       aspirin 81 MG tablet Take 1 tablet by mouth daily.  3     MULTI-VITAMIN OR once daily        CALCIUM CITRATE + D OR 1,200 mg daily        SENNA-GEN 8.6 MG OR TABS 2 TABLETS AT Twice daily 120 prn     BP Readings from Last 3 Encounters:   12/12/17 124/64   09/19/17 130/64   06/13/17 134/60    Wt Readings from Last 3 Encounters:   12/12/17 135 lb 12.8 oz (61.6 kg)   09/19/17 134 lb (60.8 kg)   06/13/17 136 lb (61.7 kg)               ROS:  As noted above.     OBJECTIVE:     /64  Pulse 56  Temp 98.1  F (36.7  C) (Temporal)  Resp 12  Wt 135 lb 12.8 oz (61.6 kg)  LMP 02/01/2011   SpO2 98%  BMI 27.71 kg/m2  Body mass index is 27.71 kg/(m^2).  GENERAL: healthy, alert and no distress  Oral- mouth without ulcers or lesions, no redness or swelling noted.   MS: Severe Kyphoscoliosis noted, arthritis in her feet and hands.   SKIN: no suspicious lesions or rashes  NEURO: Normal strength and tone, mentation intact and speech normal  PSYCH: mentation appears normal, affect normal/bright    Diagnostic Test Results:  none     ASSESSMENT/PLAN:       1. Other chronic pain  Patient has severe kyphoscoliosis,not candidate for surgery. She is normally followed by TC normally and has been on her chronic narcotics for many years, has been on current dose for many years. This has allowed her to function in her daily life. [place] can be hard, but she will not be winterizing [place] this year. She will be going to [place] January 24th. Currently based on primary notes there are no plans to taper off these medications. Discussed with patient that I will do 1 month while her primary is out for her medications. She can call in in 1 month if her PCP is back she will get two months as she normally gets 3. If her PCP is not back in office in 1 month I will give her the RX for an additional month as she is going to [place] January 24th.   - Stable     2.Sore Throat  - Throat slightly sore with redness, will check for strep which was negative. Advised humidification and gargling salt water to help with inflammation.     Follow up with OV in 3 months.   Follow up for refill in 1 month with call in     The patient indicates understanding of these issues and agrees with the plan.    Patient Instructions   - Continue on pain management regimen  - Follow up with phone call in 1 month  - Follow up with OV for 3 months  - Recommend gargling salt water and using humidification.   Hot water with honey and lemon also help soothe the throat.     RANDEE Ramirez CNP, APRN CNP  [facility]  [place]

## 2017-12-12 ENCOUNTER — OFFICE VISIT (OUTPATIENT)
Dept: FAMILY MEDICINE | Facility: OTHER | Age: 77
End: 2017-12-12
Payer: COMMERCIAL

## 2017-12-12 VITALS
RESPIRATION RATE: 12 BRPM | DIASTOLIC BLOOD PRESSURE: 64 MMHG | BODY MASS INDEX: 27.71 KG/M2 | SYSTOLIC BLOOD PRESSURE: 124 MMHG | TEMPERATURE: 98.1 F | HEART RATE: 56 BPM | OXYGEN SATURATION: 98 % | WEIGHT: 135.8 LBS

## 2017-12-12 DIAGNOSIS — G89.29 OTHER CHRONIC PAIN: Primary | ICD-10-CM

## 2017-12-12 DIAGNOSIS — R07.0 THROAT PAIN: ICD-10-CM

## 2017-12-12 LAB
DEPRECATED S PYO AG THROAT QL EIA: NORMAL
SPECIMEN SOURCE: NORMAL

## 2017-12-12 PROCEDURE — 87081 CULTURE SCREEN ONLY: CPT | Performed by: NURSE PRACTITIONER

## 2017-12-12 PROCEDURE — 99213 OFFICE O/P EST LOW 20 MIN: CPT | Performed by: NURSE PRACTITIONER

## 2017-12-12 PROCEDURE — 87880 STREP A ASSAY W/OPTIC: CPT | Performed by: NURSE PRACTITIONER

## 2017-12-12 RX ORDER — OXYCODONE AND ACETAMINOPHEN 7.5; 325 MG/1; MG/1
1 TABLET ORAL EVERY 6 HOURS PRN
Qty: 120 TABLET | Refills: 0 | Status: SHIPPED | OUTPATIENT
Start: 2017-12-21 | End: 2018-01-19

## 2017-12-12 RX ORDER — MORPHINE SULFATE 30 MG/1
30 TABLET, FILM COATED, EXTENDED RELEASE ORAL 2 TIMES DAILY
Qty: 60 TABLET | Refills: 0 | Status: SHIPPED | OUTPATIENT
Start: 2017-12-21 | End: 2018-01-19

## 2017-12-12 ASSESSMENT — PAIN SCALES - GENERAL: PAINLEVEL: MILD PAIN (3)

## 2017-12-12 NOTE — PATIENT INSTRUCTIONS
- Continue on pain management regimen  - Follow up with phone call in 1 month  - Follow up with OV for 3 months  - Recommend gargling salt water and using humidification.   Hot water with honey and lemon also help soothe the throat.     RANDEE Ramirez CNP

## 2017-12-12 NOTE — MR AVS SNAPSHOT
"              After Visit Summary   12/12/2017    Katherin Jonas    MRN: [id number]           Patient Information     Date Of Birth          1940        Visit Information        Provider Department      12/12/2017 9:00 AM Tori Boston APRN CNP [facility]        Today's Diagnoses     Throat pain    -  1    Other chronic pain          Care Instructions    - Continue on pain management regimen  - Follow up with phone call in 1 month  - Follow up with OV for 3 months  - Recommend gargling salt water and using humidification.   Hot water with honey and lemon also help soothe the throat.     RANDEE Ramirez CNP            Follow-ups after your visit        Follow-up notes from your care team     Return in about 3 months (around 3/12/2018), or if symptoms worsen or fail to improve.      Who to contact     If you have questions or need follow up information about today's clinic visit or your schedule please contact [facility] directly at [phone].  Normal or non-critical lab and imaging results will be communicated to you by [employer], letter or phone within 4 business days after the clinic has received the results. If you do not hear from us within 7 days, please contact the clinic through Altobeam[employer] or phone. If you have a critical or abnormal lab result, we will notify you by phone as soon as possible.  Submit refill requests through [employer] or call your pharmacy and they will forward the refill request to us. Please allow 3 business days for your refill to be completed.          Additional Information About Your Visit        Altobeamhart Information     [employer] lets you send messages to your doctor, view your test results, renew your prescriptions, schedule appointments and more. To sign up, go to [email or web address] . Click on \"Log in\" on the left side of the screen, which will take you to the Welcome page. Then click on \"Sign up Now\" on the right side of the page. "     You will be asked to enter the access code listed below, as well as some personal information. Please follow the directions to create your username and password.     Your access code is: [email or web address]  Expires: 2017  9:02 AM     Your access code will  in 90 days. If you need help or a new code, please call your [place] clinic or [phone].        Care EveryWhere ID     This is your Care EveryWhere ID. This could be used by other organizations to access your [place] medical records  [id number]        Your Vitals Were     Pulse Temperature Respirations Last Period Pulse Oximetry BMI (Body Mass Index)    56 98.1  F (36.7  C) (Temporal) 12 2011 98% 27.71 kg/m2       Blood Pressure from Last 3 Encounters:   17 124/64   17 130/64   17 134/60    Weight from Last 3 Encounters:   17 135 lb 12.8 oz (61.6 kg)   17 134 lb (60.8 kg)   17 136 lb (61.7 kg)              We Performed the Following     Strep, Rapid Screen          Where to get your medicines      Some of these will need a paper prescription and others can be bought over the counter.  Ask your nurse if you have questions.     Bring a paper prescription for each of these medications     morphine 30 MG 12 hr tablet    oxyCODONE-acetaminophen 7.5-325 MG per tablet          Primary Care Provider Office Phone # Fax #    Silvia KING MD Cassie [phone]       [address] [place] [address]        Equal Access to Services     [facility] : Hadii javier melgoza Soaurea, waaxda luqadaha, qaybta kaalmamartínez london . So [facility] [phone].    ATENCIÓN: Si habla español, tiene a bernardo disposición servicios gratuitos de asistencia lingüística. Llame al [phone].    We comply with applicable federal civil rights laws and [place] laws. We do not discriminate on the basis of race, color, national origin, age, disability, sex, sexual orientation,  or gender identity.            Thank you!     Thank you for choosing [facility]  for your care. Our goal is always to provide you with excellent care. Hearing back from our patients is one way we can continue to improve our services. Please take a few minutes to complete the written survey that you may receive in the mail after your visit with us. Thank you!             Your Updated Medication List - Protect others around you: Learn how to safely use, store and throw away your medicines at www.disposemymeds.org.          This list is accurate as of: 12/12/17  9:37 AM.  Always use your most recent med list.                   Brand Name Dispense Instructions for use Diagnosis    ACE/ARB/ARNI NOT PRESCRIBED (INTENTIONAL)      continuous prn. ACE & ARB not prescribed due to Other:BP controlled, no microalbuminuria        amLODIPine 5 MG tablet    NORVASC    90 tablet    Take 1 tablet (5 mg) by mouth every morning    Essential hypertension with goal blood pressure less than 140/90       aspirin 81 MG tablet      Take 1 tablet by mouth daily.    Myalgia and myositis, unspecified       atorvastatin 10 MG tablet    LIPITOR    90 tablet    Take 1 tablet (10 mg) by mouth daily    Hyperlipidemia, unspecified       CALCIUM CITRATE + D PO      1,200 mg daily        citalopram 20 MG tablet    celeXA    90 tablet    Take 1 tablet (20 mg) by mouth daily    Major depressive disorder, recurrent episode, mild (H)       eszopiclone 3 MG tablet    LUNESTA    90 tablet    Take 1 tablet (3 mg) by mouth nightly as needed    Insomnia, unspecified type       fluticasone-vilanterol 100-25 MCG/INH oral inhaler    BREO ELLIPTA     Inhale 1 puff into the lungs daily        gabapentin 300 MG capsule    NEURONTIN    360 capsule    TAKE ONE CAPSULE BY MOUTH FOUR TIMES A DAY    Postherpetic neuralgia       hydrochlorothiazide 25 MG tablet    HYDRODIURIL    90 tablet    Take 1 tablet (25 mg) by mouth every morning    Essential  hypertension with goal blood pressure less than 140/90       lidocaine 5 % Patch    LIDODERM    30 patch    Apply up to 3 patches to painful area at once for up to 12 h within a 24 h period.  Remove after 12 hours.    Other chronic pain, Postherpetic neuralgia       Lutein 6 MG Tabs      Take  by mouth daily.        metoprolol 100 MG 24 hr tablet    TOPROL-XL    90 tablet    Take 1 tablet (100 mg) by mouth daily    Essential hypertension with goal blood pressure less than 140/90       morphine 30 MG 12 hr tablet   Start taking on:  12/21/2017    MS CONTIN    60 tablet    Take 1 tablet (30 mg) by mouth 2 times daily    Other chronic pain       MULTI-VITAMIN PO      once daily        * nystatin 338361 UNIT/GM Powd    NYSTOP    120 g    APPLY 1 DOSE TOPICALLY THREE TIMES DAILY AS NEEDED    Intertrigo       * nystatin cream    MYCOSTATIN    60 g    APPLY TOPICALLY DAILY AS NEEDED(RASH UNDER BREAST AND IN GROIN)    Intertrigo       oxyCODONE-acetaminophen 7.5-325 MG per tablet   Start taking on:  12/21/2017    PERCOCET    120 tablet    Take 1 tablet by mouth every 6 hours as needed for pain    Other chronic pain       ranitidine 150 MG tablet    ZANTAC    90 tablet    Take 1 tablet (150 mg) by mouth daily    Esophageal reflux       SENNA-GEN 8.6 MG tablet   Generic drug:  senna     120    2 TABLETS AT Twice daily    Unspecified constipation       * Notice:  This list has 2 medication(s) that are the same as other medications prescribed for you. Read the directions carefully, and ask your doctor or other care provider to review them with you.

## 2017-12-12 NOTE — NURSING NOTE
"Chief Complaint   Patient presents with     Medication Therapy Management     Pain Management     Panel Management     mychart, flu, honoring choices, stormy, phq9       Initial /64  Pulse 56  Temp 98.1  F (36.7  C) (Temporal)  Resp 12  Wt 135 lb 12.8 oz (61.6 kg)  LMP 02/01/2011  SpO2 98%  BMI 27.71 kg/m2 Estimated body mass index is 27.71 kg/(m^2) as calculated from the following:    Height as of 9/19/17: 4' 10.7\" (1.491 m).    Weight as of this encounter: 135 lb 12.8 oz (61.6 kg).  Medication Reconciliation: complete  "

## 2017-12-13 LAB
BACTERIA SPEC CULT: NORMAL
SPECIMEN SOURCE: NORMAL

## 2018-01-15 NOTE — PROGRESS NOTES
SUBJECTIVE:                                                    Katherin Jonas is a 77 year old female who presents to clinic today for the following health issues:      HPI    Chronic Pain Follow-Up       Type / Location of Pain: neck, back, feet  Analgesia/pain control:       Recent changes:  Arthritis in feet flared up prior to cortisone injection      Overall control: Tolerable with discomfort  Activity level/function:      Daily activities:  Has a  and her  cooks for her     Work:  not applicable  Adverse effects:  No  Adherance    Taking medication as directed?  Yes    Participating in other treatments: yes acupuncture   Risk Factors:    Sleep:  Good    Mood/anxiety:  controlled    Recent family or social stressors:  none noted    Other aggravating factors: repetitive activities - repetitve motions   PHQ-9 SCORE 2016 10/25/2016 2017   Total Score - - -   Total Score 6 0 2     RACHID-7 SCORE 2016 10/25/2016 2017   Total Score - - -   Total Score 3 3 4     Encounter-Level CSA - 2016:          Controlled Substance Agreement - Scan on 2016  2:21 PM : CONTROLLED SUBSTANCE AGREEMENT (below)              Feels her pain is tolerable.  Has sold home in [place], plans to go on 3 week vacation to [place] next week.    Problem list and histories reviewed & adjusted, as indicated.  Additional history: as documented    Patient Active Problem List   Diagnosis     Esophageal reflux     Carotid atherosclerosis     CKD (chronic kidney disease) stage 3, GFR 30-59 ml/min     Insomnia     Mild major depression (H)     Advanced directives, counseling/discussion     Hypertension goal BP (blood pressure) < 140/90     Hyperlipidemia, unspecified     Scoliosis     Osteopenia     Postherpetic neuralgia     Other chronic pain     Past Surgical History:   Procedure Laterality Date     C FULL ROUT OBSTE CARE, DELIV       C FULL ROUT OBSTE CARE, DELIV       C FULL ROUT  OBSTE CARE, DELIV  1970     C TOTAL KNEE ARTHROPLASTY  1997    left     C TOTAL KNEE ARTHROPLASTY  2005    right     C TREAT ECTOPIC PREG,RMV TUBE/OVARY  1967    left     COLONOSCOPY  11    [facility] REPAIR OF NASAL SEPTUM         Social History   Substance Use Topics     Smoking status: Former Smoker     Quit date: 1979     Smokeless tobacco: Never Used      Comment: no smokers in household     Alcohol use No      Comment: none since 2006     Family History   Problem Relation Age of Onset     CANCER Daughter      Hypertension Mother              ROS:  C: NEGATIVE for fever, chills, change in weight  E/M: NEGATIVE for ear, mouth and throat problems  R: NEGATIVE for significant cough or SOB  CV: NEGATIVE for chest pain, palpitations or peripheral edema    OBJECTIVE:     /62 (BP Location: Right arm, Patient Position: Chair, Cuff Size: Adult Regular)  Pulse 54  Temp 99.1  F (37.3  C) (Temporal)  Resp 14  Wt 136 lb (61.7 kg)  LMP 2011  BMI 27.75 kg/m2  Body mass index is 27.75 kg/(m^2).  Gen: no apparent distress  Chest/CV: S1 and S2 normal, no murmurs, clicks, gallops or rubs. Regular rate and rhythm. Chest is clear; no wheezes or rales. No edema.  Back:  Significant kyphoscoliosis, no spinous process tenderness, does have paralumbar musculature tenderness on the left and parathoracic musculature tenderness on the right.  Significant loss of back extension, mild loss of back flexion.  Neuro:  Slow gait, able to stand on toes and heels for only a second before dropping, feels unbalanced.  Psych: Alert and oriented times 3; coherent speech, normal   rate and volume, able to articulate logical thoughts, able   to abstract reason, no tangential thoughts, no hallucinations   or delusions  Her affect is neutral    ASSESSMENT/PLAN:     1. Other chronic pain  She has been on narcotics for many years for her significant back issues from her scolioisis,  she follows with a back specialist who does not feel surgery would be in her best interest.  It has been several years since her last dose reduction.  She is agreeable to try another dose reduction from Percocet 4 times daily to 3 times daily.  Will follow up in 3 months.    - oxyCODONE-acetaminophen (PERCOCET) 7.5-325 MG per tablet; Take 1 tablet by mouth every 8 hours as needed for pain  Dispense: 90 tablet; Refill: 0  - morphine (MS CONTIN) 30 MG 12 hr tablet; Take 1 tablet (30 mg) by mouth 2 times daily  Dispense: 60 tablet; Refill: 0    2. Chronic, continuous use of opioids  Discussed that because of her chronic narcotic use, would recommend that she have Narcan at home to treat any respiratory distress from her narcotics.    - naloxone (EVZIO) auto-injector; Inject 0.4 mLs (0.4 mg) into the muscle as needed for opioid reversal every 2-3 minutes until assistance arrives  Dispense: 0.8 mL; Refill: 0    3. Essential hypertension with goal blood pressure less than 140/90  Controlled.    - amLODIPine (NORVASC) 5 MG tablet; Take 1 tablet (5 mg) by mouth every morning  Dispense: 90 tablet; Refill: 3    Silvia Matthews MD  [facility]

## 2018-01-19 ENCOUNTER — OFFICE VISIT (OUTPATIENT)
Dept: FAMILY MEDICINE | Facility: OTHER | Age: 78
End: 2018-01-19
Payer: COMMERCIAL

## 2018-01-19 VITALS
WEIGHT: 136 LBS | SYSTOLIC BLOOD PRESSURE: 104 MMHG | DIASTOLIC BLOOD PRESSURE: 62 MMHG | BODY MASS INDEX: 27.75 KG/M2 | HEART RATE: 54 BPM | RESPIRATION RATE: 14 BRPM | TEMPERATURE: 99.1 F

## 2018-01-19 DIAGNOSIS — G89.29 OTHER CHRONIC PAIN: Primary | ICD-10-CM

## 2018-01-19 DIAGNOSIS — F11.90 CHRONIC, CONTINUOUS USE OF OPIOIDS: ICD-10-CM

## 2018-01-19 DIAGNOSIS — I10 ESSENTIAL HYPERTENSION WITH GOAL BLOOD PRESSURE LESS THAN 140/90: ICD-10-CM

## 2018-01-19 PROCEDURE — 99214 OFFICE O/P EST MOD 30 MIN: CPT | Performed by: FAMILY MEDICINE

## 2018-01-19 RX ORDER — AMLODIPINE BESYLATE 5 MG/1
5 TABLET ORAL EVERY MORNING
Qty: 90 TABLET | Refills: 3 | Status: SHIPPED | OUTPATIENT
Start: 2018-01-19 | End: 2018-02-16

## 2018-01-19 RX ORDER — MORPHINE SULFATE 30 MG/1
30 TABLET, FILM COATED, EXTENDED RELEASE ORAL 2 TIMES DAILY
Qty: 60 TABLET | Refills: 0 | Status: SHIPPED | OUTPATIENT
Start: 2018-01-19 | End: 2018-01-19

## 2018-01-19 RX ORDER — MORPHINE SULFATE 30 MG/1
30 TABLET, FILM COATED, EXTENDED RELEASE ORAL 2 TIMES DAILY
Qty: 60 TABLET | Refills: 0 | Status: SHIPPED | OUTPATIENT
Start: 2018-03-19 | End: 2018-04-17

## 2018-01-19 RX ORDER — OXYCODONE AND ACETAMINOPHEN 7.5; 325 MG/1; MG/1
1 TABLET ORAL EVERY 8 HOURS PRN
Qty: 90 TABLET | Refills: 0 | Status: SHIPPED | OUTPATIENT
Start: 2018-03-19 | End: 2018-01-19

## 2018-01-19 RX ORDER — OXYCODONE AND ACETAMINOPHEN 7.5; 325 MG/1; MG/1
1 TABLET ORAL EVERY 8 HOURS PRN
Qty: 90 TABLET | Refills: 0 | Status: SHIPPED | OUTPATIENT
Start: 2018-01-19 | End: 2018-01-19

## 2018-01-19 RX ORDER — OXYCODONE AND ACETAMINOPHEN 7.5; 325 MG/1; MG/1
1 TABLET ORAL EVERY 8 HOURS PRN
Qty: 90 TABLET | Refills: 0 | Status: SHIPPED | OUTPATIENT
Start: 2018-02-19 | End: 2018-04-17

## 2018-01-19 RX ORDER — MORPHINE SULFATE 30 MG/1
30 TABLET, FILM COATED, EXTENDED RELEASE ORAL 2 TIMES DAILY
Qty: 60 TABLET | Refills: 0 | Status: SHIPPED | OUTPATIENT
Start: 2018-02-19 | End: 2018-01-19

## 2018-01-19 NOTE — NURSING NOTE
"Chief Complaint   Patient presents with     Recheck Medication     Pain meds     Panel Management     height, mychart, flu, honoring choices, phq9       Initial /62 (BP Location: Right arm, Patient Position: Chair, Cuff Size: Adult Regular)  Pulse 54  Temp 99.1  F (37.3  C) (Temporal)  Resp 14  Wt 136 lb (61.7 kg)  LMP 02/01/2011  BMI 27.75 kg/m2 Estimated body mass index is 27.75 kg/(m^2) as calculated from the following:    Height as of 9/19/17: 4' 10.7\" (1.491 m).    Weight as of this encounter: 136 lb (61.7 kg).  Medication Reconciliation: complete   Kirsten Jaffe CMA      "

## 2018-01-19 NOTE — MR AVS SNAPSHOT
"              After Visit Summary   1/19/2018    Katherin Jonas    MRN: [id number]           Patient Information     Date Of Birth          1940        Visit Information        Provider Department      1/19/2018 1:20 PM Silvia Matthews MD [facility]        Today's Diagnoses     Other chronic pain    -  1    Chronic, continuous use of opioids        Essential hypertension with goal blood pressure less than 140/90           Follow-ups after your visit        Follow-up notes from your care team     Return in about 3 months (around 4/19/2018), or chronic pain.      Who to contact     If you have questions or need follow up information about today's clinic visit or your schedule please contact [facility] directly at [phone].  Normal or non-critical lab and imaging results will be communicated to you by MyChart, letter or phone within 4 business days after the clinic has received the results. If you do not hear from us within 7 days, please contact the clinic through MyChart or phone. If you have a critical or abnormal lab result, we will notify you by phone as soon as possible.  Submit refill requests through [employer] or call your pharmacy and they will forward the refill request to us. Please allow 3 business days for your refill to be completed.          Additional Information About Your Visit        MyChart Information     [employer] lets you send messages to your doctor, view your test results, renew your prescriptions, schedule appointments and more. To sign up, go to [email or web address] . Click on \"Log in\" on the left side of the screen, which will take you to the Welcome page. Then click on \"Sign up Now\" on the right side of the page.     You will be asked to enter the access code listed below, as well as some personal information. Please follow the directions to create your username and password.     Your access code is: [email or web address]  Expires: 4/19/2018  1:54 " PM     Your access code will  in 90 days. If you need help or a new code, please call your [place] clinic or [phone].        Care EveryWhere ID     This is your Care EveryWhere ID. This could be used by other organizations to access your [facility] [place] medical records  [id number]        Your Vitals Were     Pulse Temperature Respirations Last Period BMI (Body Mass Index)       54 99.1  F (37.3  C) (Temporal) 14 2011 27.75 kg/m2        Blood Pressure from Last 3 Encounters:   18 104/62   17 124/64   17 130/64    Weight from Last 3 Encounters:   18 136 lb (61.7 kg)   17 135 lb 12.8 oz (61.6 kg)   17 134 lb (60.8 kg)              Today, you had the following     No orders found for display         Today's Medication Changes          These changes are accurate as of: 18  1:54 PM.  If you have any questions, ask your nurse or doctor.               Start taking these medicines.        Dose/Directions    morphine 30 MG 12 hr tablet   Commonly known as:  MS CONTIN   Used for:  Other chronic pain   Started by:  Silvia Matthews MD        Dose:  30 mg   Start taking on:  2018   Take 1 tablet (30 mg) by mouth 2 times daily   Quantity:  60 tablet   Refills:  0       naloxone auto-injector   Commonly known as:  EVZIO   Used for:  Chronic, continuous use of opioids   Started by:  Silvia Matthews MD        Dose:  0.4 mg   Inject 0.4 mLs (0.4 mg) into the muscle as needed for opioid reversal every 2-3 minutes until assistance arrives   Quantity:  0.8 mL   Refills:  0       oxyCODONE-acetaminophen 7.5-325 MG per tablet   Commonly known as:  PERCOCET   Used for:  Other chronic pain   Started by:  Silvia Matthews MD        Dose:  1 tablet   Start taking on:  3/19/2018   Take 1 tablet by mouth every 8 hours as needed for pain   Quantity:  90 tablet   Refills:  0            Where to get your medicines      These medications were sent to [facility]  [id number] - [facility] [address] AT [facility] & [place] [address], [facility] [address]     Phone:  [phone]     amLODIPine 5 MG tablet    naloxone auto-injector         Some of these will need a paper prescription and others can be bought over the counter.  Ask your nurse if you have questions.     Bring a paper prescription for each of these medications     morphine 30 MG 12 hr tablet    oxyCODONE-acetaminophen 7.5-325 MG per tablet                Primary Care Provider Office Phone # Fax #    Silvia Matthews [phone]       [address]  [facility] [address]        Equal Access to Services     PAMELA HUERTA : Hadii javier diazo Soaurea, waaxda luqadaha, qaybta kaalmada adeaddy, martínez ramirez . So [facility] [phone].    ATENCIÓN: Si habla español, tiene a bernardo disposición servicios gratuitos de asistencia lingüística. Llame al [phone].    We comply with applicable federal civil rights laws and [place] laws. We do not discriminate on the basis of race, color, national origin, age, disability, sex, sexual orientation, or gender identity.            Thank you!     Thank you for choosing [facility] [place]  for your care. Our goal is always to provide you with excellent care. Hearing back from our patients is one way we can continue to improve our services. Please take a few minutes to complete the written survey that you may receive in the mail after your visit with us. Thank you!             Your Updated Medication List - Protect others around you: Learn how to safely use, store and throw away your medicines at www.disposemymeds.org.          This list is accurate as of: 1/19/18  1:54 PM.  Always use your most recent med list.                   Brand Name Dispense Instructions for use Diagnosis    ACE/ARB/ARNI NOT PRESCRIBED (INTENTIONAL)      continuous prn. ACE & ARB not prescribed due to Other:BP  controlled, no microalbuminuria        amLODIPine 5 MG tablet    NORVASC    90 tablet    Take 1 tablet (5 mg) by mouth every morning    Essential hypertension with goal blood pressure less than 140/90       aspirin 81 MG tablet      Take 1 tablet by mouth daily.    Myalgia and myositis, unspecified       atorvastatin 10 MG tablet    LIPITOR    90 tablet    Take 1 tablet (10 mg) by mouth daily    Hyperlipidemia, unspecified       CALCIUM CITRATE + D PO      1,200 mg daily        citalopram 20 MG tablet    celeXA    90 tablet    Take 1 tablet (20 mg) by mouth daily    Major depressive disorder, recurrent episode, mild (H)       eszopiclone 3 MG tablet    LUNESTA    90 tablet    Take 1 tablet (3 mg) by mouth nightly as needed    Insomnia, unspecified type       fluticasone-vilanterol 100-25 MCG/INH oral inhaler    BREO ELLIPTA     Inhale 1 puff into the lungs daily        gabapentin 300 MG capsule    NEURONTIN    360 capsule    TAKE ONE CAPSULE BY MOUTH FOUR TIMES A DAY    Postherpetic neuralgia       hydrochlorothiazide 25 MG tablet    HYDRODIURIL    90 tablet    Take 1 tablet (25 mg) by mouth every morning    Essential hypertension with goal blood pressure less than 140/90       lidocaine 5 % Patch    LIDODERM    30 patch    Apply up to 3 patches to painful area at once for up to 12 h within a 24 h period.  Remove after 12 hours.    Other chronic pain, Postherpetic neuralgia       Lutein 6 MG Tabs      Take  by mouth daily.        metoprolol succinate 100 MG 24 hr tablet    TOPROL-XL    90 tablet    Take 1 tablet (100 mg) by mouth daily    Essential hypertension with goal blood pressure less than 140/90       morphine 30 MG 12 hr tablet   Start taking on:  2/19/2018    MS CONTIN    60 tablet    Take 1 tablet (30 mg) by mouth 2 times daily    Other chronic pain       MULTI-VITAMIN PO      once daily        naloxone auto-injector    EVZIO    0.8 mL    Inject 0.4 mLs (0.4 mg) into the muscle as needed for opioid  reversal every 2-3 minutes until assistance arrives    Chronic, continuous use of opioids       * nystatin 507242 UNIT/GM Powd    NYSTOP    120 g    APPLY 1 DOSE TOPICALLY THREE TIMES DAILY AS NEEDED    Intertrigo       * nystatin cream    MYCOSTATIN    60 g    APPLY TOPICALLY DAILY AS NEEDED(RASH UNDER BREAST AND IN GROIN)    Intertrigo       oxyCODONE-acetaminophen 7.5-325 MG per tablet   Start taking on:  3/19/2018    PERCOCET    90 tablet    Take 1 tablet by mouth every 8 hours as needed for pain    Other chronic pain       ranitidine 150 MG tablet    ZANTAC    90 tablet    Take 1 tablet (150 mg) by mouth daily    Esophageal reflux       SENNA-GEN 8.6 MG tablet   Generic drug:  senna     120    2 TABLETS AT Twice daily    Unspecified constipation       * Notice:  This list has 2 medication(s) that are the same as other medications prescribed for you. Read the directions carefully, and ask your doctor or other care provider to review them with you.

## 2018-02-01 ENCOUNTER — TRANSFERRED RECORDS (OUTPATIENT)
Dept: HEALTH INFORMATION MANAGEMENT | Facility: CLINIC | Age: 78
End: 2018-02-01

## 2018-02-07 ENCOUNTER — TRANSFERRED RECORDS (OUTPATIENT)
Dept: HEALTH INFORMATION MANAGEMENT | Facility: CLINIC | Age: 78
End: 2018-02-07

## 2018-02-07 LAB
ALT SERPL-CCNC: 31 IU/L (ref 12–68)
AST SERPL-CCNC: 26 IU/L (ref 15–37)
EJECTION FRACTION: 68

## 2018-02-08 LAB
CREAT SERPL-MCNC: 0.63 MG/DL (ref 0.55–1.02)
GFR SERPL CREATININE-BSD FRML MDRD: >60 ML/MIN/1.73M2
GLUCOSE SERPL-MCNC: 141 MG/DL (ref 70–110)

## 2018-02-09 LAB — POTASSIUM SERPL-SCNC: 4.2 MMOL/L (ref 3.5–5.1)

## 2018-02-13 ENCOUNTER — TELEPHONE (OUTPATIENT)
Dept: FAMILY MEDICINE | Facility: OTHER | Age: 78
End: 2018-02-13

## 2018-02-13 NOTE — TELEPHONE ENCOUNTER
Reason for Call:  Other prescription    Detailed comments: [facility] calling states pt was seen at urgent care [place] and prescribed zofran. Pharmacy states needs prior auth for this medication and urgent care doesn't do PA. Wondering if Cassie can do a PA for medication. Please contact [facility] [phone]    Phone Number Patient can be reached at: Home number on file [phone] (home)    Best Time: ANY    Can we leave a detailed message on this number? YES    Call taken on 2/13/2018 at 2:29 PM by Mayra Dunham

## 2018-02-13 NOTE — TELEPHONE ENCOUNTER
RN huddled with TC. Due to patient recently being seen, OKAY to complete PA for medication. Shanta Britt RN, BSN

## 2018-02-14 ENCOUNTER — TELEPHONE (OUTPATIENT)
Dept: FAMILY MEDICINE | Facility: OTHER | Age: 78
End: 2018-02-14

## 2018-02-14 NOTE — TELEPHONE ENCOUNTER
LM for patient to return call.   OK for  Only on Friday at 10:40 if still available.     Smiley Shannon, RN, BSN

## 2018-02-14 NOTE — PROGRESS NOTES
SUBJECTIVE:   Katherin Jonas is a 77 year old female who presents to clinic today for the following health issues:      HPI         Hospital Follow-up Visit:    Hospital/Nursing Home/IP Rehab Facility: [facility]   Date of Admission: 2/7/18  Date of Discharge: 2/9/18  Reason(s) for Admission: Influenza A, Hypokalemia             Problems taking medications regularly:  None       Medication changes since discharge: None       Problems adhering to non-medication therapy:  None    Summary of hospitalization:  [facility] information obtained and reviewed and copied below      DISCHARGE DIAGNOSES   1. Influenza infection with nausea and dehydration  2. Severe hypokalemia  3. Type II MI, supply demand ischemia due to influenza  4. Hyponatremia    HOSPITAL COURSE   Katherin Jonas is a 77 y.o. female with history of hypertension, hyperlipidemia, chronic pain, who presents with recent diagnosis of influenza A while visiting in [place] a week ago, started on Tamiflu, and subsequently admitted there, flew back home to [place] on 2/6/2010, who was then admitted to [facility] on 2/7/2018 with severe dehydration with nausea.    1. Influenza A infection with associated nausea and dehydration. Supportive cares with IV fluids. Chest x-ray without clear infiltrate. No evidence of sepsis. Procalcitonin negative. Held off on further antibiotics, previous prescription of levofloxacin discontinued. Patient previously treated with Tamiflu x 3 days and symptom onset >1 week ago. No further need for Tamiflu. Discharged with additional Zofran. Tolerating diet well. Patient is to hold off on using Celexa until no longer using Zofran due to potential QTC prolongation. She did well with PT.  2. Severe hypokalemia. Due to nausea and hydrochlorothiazide. Replaced.  3. Type II MI, supply demand ischemia. Due to influenza. Troponin 0.101->0.08. Mild lateral ST depression is present on EKG. Echocardiogram without WMA, EF  normal. No known history of CAD. ASA. Recommend discussing with PCP for outpatient stress testing when flu symptoms and nausea resolved. No current chest pain.  4. Hyponatremia. Improved with IVF.  5. COPD/Asthma. No longer smoking. Continue inhalers. Wheezing has improved. Was recently on prednisone 40mg daily from previous hospitalization. The patient no longer wants to take further steroids, and will not need further tapering due to her short course.  6. Hypertension. Continue PTA Toprol XL, HCTZ, Norvasc.  7. Chronic pain. Continue PTA Percocet and MS Contin.    Diagnostic Tests/Treatments reviewed.  Follow up needed: outpatient stress test  Other Healthcare Providers Involved in Patient s Care:         None  Update since discharge: She reports slowly improving but does not feel quite like herself yet.  She states the nausea has resolved and she is no longer taking her nausea medication.  She denies vomiting or diarrhea.  She feels her breathing is stable.  She feels tired and weak.  Because of this she is using 1 of our clinic wheelchairs.    Post Discharge Medication Reconciliation: discharge medications reconciled and changed, per note/orders (see AVS).  Plan of care communicated with patient     Coding guidelines for this visit:  Type of Medical   Decision Making Face-to-Face Visit       within 7 Days of discharge Face-to-Face Visit        within 14 days of discharge   Moderate Complexity 45003 [id number]   High Complexity 21971 94001              Patient Active Problem List   Diagnosis     Esophageal reflux     Carotid atherosclerosis     CKD (chronic kidney disease) stage 3, GFR 30-59 ml/min     Insomnia     Mild major depression (H)     Advanced directives, counseling/discussion     Hypertension goal BP (blood pressure) < 140/90     Hyperlipidemia, unspecified     Scoliosis     Osteopenia     Postherpetic neuralgia     Other chronic pain     Past Surgical History:   Procedure Laterality Date     C FULL ROUT  OBSTE CARE, DELIV  1964     C FULL ROUT OBSTE CARE, DELIV  1969     C FULL ROUT OBSTE CARE, DELIV  1970     C TOTAL KNEE ARTHROPLASTY  1997    left     C TOTAL KNEE ARTHROPLASTY  2005    right     C TREAT ECTOPIC PREG,RMV TUBE/OVARY  1967    left     COLONOSCOPY  11    [facility] REPAIR OF NASAL SEPTUM         Social History   Substance Use Topics     Smoking status: Former Smoker     Quit date: 1979     Smokeless tobacco: Never Used      Comment: no smokers in household     Alcohol use No      Comment: none since 2006     Family History   Problem Relation Age of Onset     CANCER Daughter      Hypertension Mother            ROS:  CONSTITUTIONAL: NEGATIVE for fever, chills  ENT/MOUTH: NEGATIVE for ear, mouth and throat problems  RESP: NEGATIVE for significant cough or shortness of breath   CV: NEGATIVE for chest pain, palpitations or peripheral edema    OBJECTIVE:     BP 90/52 (BP Location: Right arm, Patient Position: Chair, Cuff Size: Adult Large)  Pulse 114  Temp 99.4  F (37.4  C) (Temporal)  Resp 16  LMP 2011  SpO2 96%  There is no height or weight on file to calculate BMI.  Gen: no apparent respiratory distress, sitting in wheelchair, slumped to side, able to sit up when I walk in the room  NECK: no adenopathy, no asymmetry, no masses  Chest: clear to auscultation without wheeze, rale or rhonchi  Cor: regular rate and rhythm without murmur  Back: kyphoscoliosis  ABDOMEN: soft, nontender, no masses and bowel sounds normal  Ext: warm and dry without edema  Psych: Alert and oriented times 3; coherent speech, normal   rate and volume, able to articulate logical thoughts, able   to abstract reason, no tangential thoughts, no hallucinations   or delusions  Her affect is neutral.     ASSESSMENT/PLAN:     1. Hypertension goal BP (blood pressure) < 140/90  Her blood pressure is low today and I suspect that she is still dehydrated from her  recent illness.  I have recommended she not take her amlodipine today.  I have recommended she not take her hydrochlorothiazide.  Both of these medication should be stopped until further notice.  She will continue with her metoprolol.  Will also draw labs.  - Basic metabolic panel  - CBC with platelets  - Lipid panel reflex to direct LDL Fasting    2. CKD (chronic kidney disease) stage 3, GFR 30-59 ml/min  Will draw labs.    3. Hyperlipidemia, unspecified hyperlipidemia type  She tells me she is fasting today therefore will obtain her cholesterol as well.  - Basic metabolic panel  - Lipid panel reflex to direct LDL Fasting    4. Mild major depression (H)  Her Celexa was held in the hospital while she was on Zofran because of the possibility of prolonged QT interval.  She was told to restart this when she stopped her Zofran however she has not restarted it yet.  At this time we will keep her off of her medication while recovering from her illness.    5. Non-ST elevation myocardial infarction (NSTEMI), type 2 (H)  In the hospital her troponin was elevated and this was thought to be due to strain from her acute illness.  At some point would like to get a stress test however she is still feeling too weak from her recent illness.  She would be unable to do a treadmill test and would need to have a Julia scan.    Patient will follow up in 2 weeks and will consider outpatient stress test scheduling at that time.        Silvia Matthews MD  [facility]

## 2018-02-14 NOTE — TELEPHONE ENCOUNTER
Reason for call:  Patient called and wanted to get in earlier then next Tuesday.  She wanted to let you know that she has been hospitalized 2 times in the last 2 weeks.  She would like to be seen to see if she is doing everything she is suppose to get better. Ok to leave a message.

## 2018-02-16 ENCOUNTER — OFFICE VISIT (OUTPATIENT)
Dept: FAMILY MEDICINE | Facility: OTHER | Age: 78
End: 2018-02-16
Payer: COMMERCIAL

## 2018-02-16 VITALS
SYSTOLIC BLOOD PRESSURE: 90 MMHG | TEMPERATURE: 99.4 F | DIASTOLIC BLOOD PRESSURE: 52 MMHG | HEART RATE: 114 BPM | OXYGEN SATURATION: 96 % | RESPIRATION RATE: 16 BRPM

## 2018-02-16 DIAGNOSIS — I10 HYPERTENSION GOAL BP (BLOOD PRESSURE) < 140/90: Primary | ICD-10-CM

## 2018-02-16 DIAGNOSIS — I21.A1 NON-ST ELEVATION MYOCARDIAL INFARCTION (NSTEMI), TYPE 2: ICD-10-CM

## 2018-02-16 DIAGNOSIS — E78.5 HYPERLIPIDEMIA, UNSPECIFIED HYPERLIPIDEMIA TYPE: ICD-10-CM

## 2018-02-16 DIAGNOSIS — F32.0 MILD MAJOR DEPRESSION (H): ICD-10-CM

## 2018-02-16 DIAGNOSIS — N18.30 CKD (CHRONIC KIDNEY DISEASE) STAGE 3, GFR 30-59 ML/MIN (H): ICD-10-CM

## 2018-02-16 LAB
ANION GAP SERPL CALCULATED.3IONS-SCNC: 10 MMOL/L (ref 3–14)
BUN SERPL-MCNC: 14 MG/DL (ref 7–30)
CALCIUM SERPL-MCNC: 8.9 MG/DL (ref 8.5–10.1)
CHLORIDE SERPL-SCNC: 96 MMOL/L (ref 94–109)
CHOLEST SERPL-MCNC: 127 MG/DL
CO2 SERPL-SCNC: 29 MMOL/L (ref 20–32)
CREAT SERPL-MCNC: 0.69 MG/DL (ref 0.52–1.04)
ERYTHROCYTE [DISTWIDTH] IN BLOOD BY AUTOMATED COUNT: 14.1 % (ref 10–15)
GFR SERPL CREATININE-BSD FRML MDRD: 83 ML/MIN/1.7M2
GLUCOSE SERPL-MCNC: 103 MG/DL (ref 70–99)
HCT VFR BLD AUTO: 33.8 % (ref 35–47)
HDLC SERPL-MCNC: 37 MG/DL
HGB BLD-MCNC: 11.4 G/DL (ref 11.7–15.7)
LDLC SERPL CALC-MCNC: 71 MG/DL
MCH RBC QN AUTO: 30.7 PG (ref 26.5–33)
MCHC RBC AUTO-ENTMCNC: 33.7 G/DL (ref 31.5–36.5)
MCV RBC AUTO: 91 FL (ref 78–100)
NONHDLC SERPL-MCNC: 90 MG/DL
PLATELET # BLD AUTO: 259 10E9/L (ref 150–450)
POTASSIUM SERPL-SCNC: 3.8 MMOL/L (ref 3.4–5.3)
RBC # BLD AUTO: 3.71 10E12/L (ref 3.8–5.2)
SODIUM SERPL-SCNC: 135 MMOL/L (ref 133–144)
TRIGL SERPL-MCNC: 94 MG/DL
WBC # BLD AUTO: 10.1 10E9/L (ref 4–11)

## 2018-02-16 PROCEDURE — 99214 OFFICE O/P EST MOD 30 MIN: CPT | Performed by: FAMILY MEDICINE

## 2018-02-16 PROCEDURE — 80048 BASIC METABOLIC PNL TOTAL CA: CPT | Performed by: FAMILY MEDICINE

## 2018-02-16 PROCEDURE — 85027 COMPLETE CBC AUTOMATED: CPT | Performed by: FAMILY MEDICINE

## 2018-02-16 PROCEDURE — 80061 LIPID PANEL: CPT | Performed by: FAMILY MEDICINE

## 2018-02-16 PROCEDURE — 36415 COLL VENOUS BLD VENIPUNCTURE: CPT | Performed by: FAMILY MEDICINE

## 2018-02-16 NOTE — NURSING NOTE
"Chief Complaint   Patient presents with     RECHECK     Panel Management     honoring choices, [id number]       Initial BP 90/52 (BP Location: Right arm, Patient Position: Chair, Cuff Size: Adult Large)  Pulse 114  Temp 99.4  F (37.4  C) (Temporal)  Resp 16  LMP 02/01/2011  SpO2 96% Estimated body mass index is 27.75 kg/(m^2) as calculated from the following:    Height as of 9/19/17: 4' 10.7\" (1.491 m).    Weight as of 1/19/18: 136 lb (61.7 kg).  Medication Reconciliation: complete   Kirsten Jaffe, DRE      "

## 2018-02-16 NOTE — MR AVS SNAPSHOT
"              After Visit Summary   2/16/2018    Katherin Jonas    MRN: [id number]           Patient Information     Date Of Birth          1940        Visit Information        Provider Department      2/16/2018 10:40 AM Silvia Matthews MD [facility]        Today's Diagnoses     Hypertension goal BP (blood pressure) < 140/90    -  1    CKD (chronic kidney disease) stage 3, GFR 30-59 ml/min        Hyperlipidemia, unspecified hyperlipidemia type        Mild major depression (H)           Follow-ups after your visit        Follow-up notes from your care team     Return in about 2 weeks (around 3/2/2018).      Who to contact     If you have questions or need follow up information about today's clinic visit or your schedule please contact [facility] directly at [phone].  Normal or non-critical lab and imaging results will be communicated to you by MyChart, letter or phone within 4 business days after the clinic has received the results. If you do not hear from us within 7 days, please contact the clinic through MyChart or phone. If you have a critical or abnormal lab result, we will notify you by phone as soon as possible.  Submit refill requests through [employer] or call your pharmacy and they will forward the refill request to us. Please allow 3 business days for your refill to be completed.          Additional Information About Your Visit        MyChart Information     [employer] lets you send messages to your doctor, view your test results, renew your prescriptions, schedule appointments and more. To sign up, go to [email or web address] . Click on \"Log in\" on the left side of the screen, which will take you to the Welcome page. Then click on \"Sign up Now\" on the right side of the page.     You will be asked to enter the access code listed below, as well as some personal information. Please follow the directions to create your username and password.     Your access code " is: [email or web address]  Expires: 2018  1:54 PM     Your access code will  in 90 days. If you need help or a new code, please call your [place] clinic or [phone].        Care EveryWhere ID     This is your Care EveryWhere ID. This could be used by other organizations to access your [place] medical records  [id number]        Your Vitals Were     Pulse Temperature Respirations Last Period Pulse Oximetry       114 99.4  F (37.4  C) (Temporal) 16 2011 96%        Blood Pressure from Last 3 Encounters:   18 90/52   18 104/62   17 124/64    Weight from Last 3 Encounters:   18 136 lb (61.7 kg)   17 135 lb 12.8 oz (61.6 kg)   17 134 lb (60.8 kg)              We Performed the Following     Basic metabolic panel     CBC with platelets     Lipid panel reflex to direct LDL Fasting          Today's Medication Changes          These changes are accurate as of 18 11:13 AM.  If you have any questions, ask your nurse or doctor.               Stop taking these medicines if you haven't already. Please contact your care team if you have questions.     amLODIPine 5 MG tablet   Commonly known as:  NORVASC   Stopped by:  Silvia Matthews MD           citalopram 20 MG tablet   Commonly known as:  celeXA   Stopped by:  Silvia Matthews MD           hydrochlorothiazide 25 MG tablet   Commonly known as:  HYDRODIURIL   Stopped by:  Silvia Matthews MD                    Primary Care Provider Office Phone # Fax #    Silvia Matthews [phone]       [address]  [place] [address]        Equal Access to Services     [facility]: Hadii javier Ireland, waaxda luqadaha, qaybta kaalmada adeegyada, martínez molina. So [facility] [phone].    ATENCIÓN: Si habla español, tiene a bernardo disposición servicios gratuitos de asistencia lingüística. Llame al [phone].    We comply with applicable federal civil  rights laws and [place] laws. We do not discriminate on the basis of race, color, national origin, age, disability, sex, sexual orientation, or gender identity.            Thank you!     Thank you for choosing [facility]  for your care. Our goal is always to provide you with excellent care. Hearing back from our patients is one way we can continue to improve our services. Please take a few minutes to complete the written survey that you may receive in the mail after your visit with us. Thank you!             Your Updated Medication List - Protect others around you: Learn how to safely use, store and throw away your medicines at www.disposemymeds.org.          This list is accurate as of 2/16/18 11:13 AM.  Always use your most recent med list.                   Brand Name Dispense Instructions for use Diagnosis    ACE/ARB/ARNI NOT PRESCRIBED (INTENTIONAL)      continuous prn. ACE & ARB not prescribed due to Other:BP controlled, no microalbuminuria        aspirin 81 MG tablet      Take 1 tablet by mouth daily.    Myalgia and myositis, unspecified       atorvastatin 10 MG tablet    LIPITOR    90 tablet    Take 1 tablet (10 mg) by mouth daily    Hyperlipidemia, unspecified       CALCIUM CITRATE + D PO      1,200 mg daily        eszopiclone 3 MG tablet    LUNESTA    90 tablet    Take 1 tablet (3 mg) by mouth nightly as needed    Insomnia, unspecified type       fluticasone-vilanterol 100-25 MCG/INH oral inhaler    BREO ELLIPTA     Inhale 1 puff into the lungs daily        gabapentin 300 MG capsule    NEURONTIN    360 capsule    TAKE ONE CAPSULE BY MOUTH FOUR TIMES A DAY    Postherpetic neuralgia       lidocaine 5 % Patch    LIDODERM    30 patch    Apply up to 3 patches to painful area at once for up to 12 h within a 24 h period.  Remove after 12 hours.    Other chronic pain, Postherpetic neuralgia       Lutein 6 MG Tabs      Take  by mouth daily.        metoprolol succinate 100 MG 24 hr tablet     TOPROL-XL    90 tablet    Take 1 tablet (100 mg) by mouth daily    Essential hypertension with goal blood pressure less than 140/90       morphine 30 MG 12 hr tablet   Start taking on:  3/19/2018    MS CONTIN    60 tablet    Take 1 tablet (30 mg) by mouth 2 times daily    Other chronic pain       MULTI-VITAMIN PO      once daily        naloxone auto-injector    EVZIO    0.8 mL    Inject 0.4 mLs (0.4 mg) into the muscle as needed for opioid reversal every 2-3 minutes until assistance arrives    Chronic, continuous use of opioids       * nystatin 282093 UNIT/GM Powd    NYSTOP    120 g    APPLY 1 DOSE TOPICALLY THREE TIMES DAILY AS NEEDED    Intertrigo       * nystatin cream    MYCOSTATIN    60 g    APPLY TOPICALLY DAILY AS NEEDED(RASH UNDER BREAST AND IN GROIN)    Intertrigo       oxyCODONE-acetaminophen 7.5-325 MG per tablet   Start taking on:  2/19/2018    PERCOCET    90 tablet    Take 1 tablet by mouth every 8 hours as needed for pain    Other chronic pain       ranitidine 150 MG tablet    ZANTAC    90 tablet    Take 1 tablet (150 mg) by mouth daily    Esophageal reflux       SENNA-GEN 8.6 MG tablet   Generic drug:  senna     120    2 TABLETS AT Twice daily    Unspecified constipation       * Notice:  This list has 2 medication(s) that are the same as other medications prescribed for you. Read the directions carefully, and ask your doctor or other care provider to review them with you.

## 2018-02-19 ENCOUNTER — TELEPHONE (OUTPATIENT)
Dept: FAMILY MEDICINE | Facility: OTHER | Age: 78
End: 2018-02-19

## 2018-02-19 NOTE — TELEPHONE ENCOUNTER
Spoke with pt.  She has is just getting over influenza.  She was recently in the hospital with influenza A.  She denies chest pain or difficulty breathing.  She states she has COPD, so she always has some degree of difficulty breathing but nothing worse.  Pt just has a lot of questions for Dr. Warren that she didn't ask at her appt on Friday, 2/16/18.  Advised pt to write down the things that she has questions about and to call if concerns sooner.    Gina Braden RN

## 2018-02-19 NOTE — TELEPHONE ENCOUNTER
Reason for call:  Symptom  Reason for call:  Patient reporting a symptom    Symptom or request: pt would like to be worked in this Friday the 23rd.  She feels like she is getting the flu and also needs to be seen for follow up from hospital and blood pressure.    Duration (how long have symptoms been present):     Have you been treated for this before? Yes    Additional comments: she has an apt on March 2nd but wants to come in on Friday this week.    Phone Number patient can be reached at:  Home number on file [phone] (home)    Best Time:  any    Can we leave a detailed message on this number:  YES    Call taken on 2/19/2018 at 10:28 AM by Britney Shahid

## 2018-02-19 NOTE — TELEPHONE ENCOUNTER
Spoke to patient to relay lab results, scheduled her in MD only spot on 2/23. Will flag for RN to triage if necessary due to flu like symptoms.

## 2018-02-19 NOTE — PROGRESS NOTES
SUBJECTIVE:   Katherin Jonas is a 77 year old female who presents to clinic today for the following health issues:      HPI     Patient hospitalized - for Influenza and Hypokalemia.  She was seen last week and felt her nausea had improved but was still feeling quite weak and exhausted.  At that visit her heart rate was noticed to be some mildly elevated at 111 with a systolic blood pressure in the 90s.  It was felt that this was due to her multiple cardiac medications and being dehydrated.  At that time both her amlodipine hydrochlorothiazide was stopped.    She tells me she still feels very weak and exhausted.  She notices that she gets quite short of breath with any kind of activity.  She denies having any palpitations.  She is concerned that she is off amlodipine and hydrochlorothiazide as she needed these in the past to control her blood pressure.  She continues to have a nagging cough.    Her  is with her today and also states that any activity seems to make her short of breath.    Problem list and histories reviewed & adjusted, as indicated.  Additional history: as documented    Patient Active Problem List   Diagnosis     Esophageal reflux     Carotid atherosclerosis     CKD (chronic kidney disease) stage 3, GFR 30-59 ml/min     Insomnia     Mild major depression (H)     Advanced directives, counseling/discussion     Hypertension goal BP (blood pressure) < 140/90     Hyperlipidemia, unspecified     Scoliosis     Osteopenia     Postherpetic neuralgia     Other chronic pain     Non-ST elevation myocardial infarction (NSTEMI), type 2 (H)     Past Surgical History:   Procedure Laterality Date     C FULL ROUT OBSTE CARE, DELIV       C FULL ROUT OBSTE CARE, DELIV       C FULL ROUT OBSTE CARE, DELIV       C TOTAL KNEE ARTHROPLASTY  1997    left     C TOTAL KNEE ARTHROPLASTY  2005    right     C TREAT ECTOPIC PREG,RMV TUBE/OVARY      left     COLONOSCOPY   04/01/11    [facility]      REPAIR OF NASAL SEPTUM  1980       Social History   Substance Use Topics     Smoking status: Former Smoker     Quit date: 1/1/1979     Smokeless tobacco: Never Used      Comment: no smokers in household     Alcohol use No      Comment: none since 2006     Family History   Problem Relation Age of Onset     CANCER Daughter      Hypertension Mother            OBJECTIVE:     /68 (BP Location: Left arm, Patient Position: Chair, Cuff Size: Adult Regular)  Pulse 132  Temp 98.5  F (36.9  C) (Temporal)  Resp 16  LMP 02/01/2011  SpO2 91%  There is no height or weight on file to calculate BMI.  Gen:  Sitting in wheelchair, no respiratory distress  Chest: Clear to auscultation without wheeze  Cor: irregularly irregular and tachycardic  Abd: The abdomen is soft without tenderness, guarding, mass, rebound or organomegaly. Bowel sounds are normal.   Psych: Alert and oriented times 3; coherent speech, normal   rate and volume, able to articulate logical thoughts, able   to abstract reason, no tangential thoughts, no hallucinations   or delusions  Her affect is neutral.    EKG: Atrial fibrillation with rates of 128    ASSESSMENT/PLAN:     1. Atrial fibrillation, unspecified type (H)  New onset atrial fibrillation following severe illness for which it appears she had a non-ST elevation myocardial infarction thought to be due to cardiac strain from her severe illness.  I suspect that her shortness of breath with any activity is related to her heart rate.  She is unable to feel that her heart is palpating and therefore is unclear how long she has been in this rhythm.  Her blood pressures improved compared to last week after stopping her medications however I am concerned with her shortness of breath with activity and her frail status that she would be able to manage this at home.  I did offer that we could increase her metoprolol and start her on anticoagulation at home and if  her symptoms worsen she should could go to the ER.  However I also said that she may be able to get this under control faster if she goes into the emergency department now.  After discussion with her and her  they are agreeable to go to [place] emergency department.  I did call the [place] emergency department to make them aware that she was on her way in.  I gave him a copy of her EKG.  We did decide that she could go by private car as she was having no chest pain and her blood pressure is stable.    2. NSTEMI (non-ST elevated myocardial infarction) (H)  This was diagnosed at her last hospitalization and it was felt that she needed an outpatient stress test.  However in light of her new rhythm abnormality will treat her for her atrial fibrillation and eventually get her to cardiology to discuss what further evaluation needs to be done and went the evaluation needs to be done.  - EKG 12-lead complete w/read - Clinics      Silvia Matthews MD  [facility]

## 2018-02-23 ENCOUNTER — TRANSFERRED RECORDS (OUTPATIENT)
Dept: HEALTH INFORMATION MANAGEMENT | Facility: CLINIC | Age: 78
End: 2018-02-23

## 2018-02-23 ENCOUNTER — OFFICE VISIT (OUTPATIENT)
Dept: FAMILY MEDICINE | Facility: OTHER | Age: 78
End: 2018-02-23
Payer: COMMERCIAL

## 2018-02-23 VITALS
HEART RATE: 132 BPM | SYSTOLIC BLOOD PRESSURE: 116 MMHG | DIASTOLIC BLOOD PRESSURE: 68 MMHG | TEMPERATURE: 98.5 F | RESPIRATION RATE: 16 BRPM | OXYGEN SATURATION: 91 %

## 2018-02-23 DIAGNOSIS — I48.91 ATRIAL FIBRILLATION, UNSPECIFIED TYPE (H): Primary | ICD-10-CM

## 2018-02-23 DIAGNOSIS — I21.4 NSTEMI (NON-ST ELEVATED MYOCARDIAL INFARCTION) (H): ICD-10-CM

## 2018-02-23 LAB
INR PPP: 1.3 (ref 0.9–1.1)
TSH SERPL-ACNC: 1.05 (ref 0.36–3.74)

## 2018-02-23 PROCEDURE — 99214 OFFICE O/P EST MOD 30 MIN: CPT | Performed by: FAMILY MEDICINE

## 2018-02-23 PROCEDURE — 93000 ELECTROCARDIOGRAM COMPLETE: CPT | Performed by: FAMILY MEDICINE

## 2018-02-23 ASSESSMENT — ANXIETY QUESTIONNAIRES
7. FEELING AFRAID AS IF SOMETHING AWFUL MIGHT HAPPEN: MORE THAN HALF THE DAYS
GAD7 TOTAL SCORE: 13
1. FEELING NERVOUS, ANXIOUS, OR ON EDGE: NEARLY EVERY DAY
IF YOU CHECKED OFF ANY PROBLEMS ON THIS QUESTIONNAIRE, HOW DIFFICULT HAVE THESE PROBLEMS MADE IT FOR YOU TO DO YOUR WORK, TAKE CARE OF THINGS AT HOME, OR GET ALONG WITH OTHER PEOPLE: SOMEWHAT DIFFICULT
6. BECOMING EASILY ANNOYED OR IRRITABLE: MORE THAN HALF THE DAYS
3. WORRYING TOO MUCH ABOUT DIFFERENT THINGS: MORE THAN HALF THE DAYS
2. NOT BEING ABLE TO STOP OR CONTROL WORRYING: MORE THAN HALF THE DAYS
5. BEING SO RESTLESS THAT IT IS HARD TO SIT STILL: NOT AT ALL

## 2018-02-23 ASSESSMENT — PATIENT HEALTH QUESTIONNAIRE - PHQ9: 5. POOR APPETITE OR OVEREATING: MORE THAN HALF THE DAYS

## 2018-02-23 NOTE — MR AVS SNAPSHOT
"              After Visit Summary   2/23/2018    Katherin Jonas    MRN: [id number]           Patient Information     Date Of Birth          1940        Visit Information        Provider Department      2/23/2018 10:40 AM Silvia Matthews MD [facility]        Today's Diagnoses     Atrial fibrillation, unspecified type (H)    -  1    NSTEMI (non-ST elevated myocardial infarction) (H)           Follow-ups after your visit        Your next 10 appointments already scheduled     Mar 02, 2018  1:20 PM CST   [facility]    [address] [place] [address] [address]  [facility] [address]   [phone]           Bring a current list of meds and any records pertaining to this visit. For Physicals, please bring immunization records and any forms needing to be filled out. Please arrive 10 minutes early to complete paperwork.              Who to contact     If you have questions or need follow up information about today's clinic visit or your schedule please contact [facility] directly at [phone].  Normal or non-critical lab and imaging results will be communicated to you by [facility] [employer], letter or phone within 4 business days after the clinic has received the results. If you do not hear from us within 7 days, please contact the clinic through [facility] or phone. If you have a critical or abnormal lab result, we will notify you by phone as soon as possible.  Submit refill requests through [employer] or call your pharmacy and they will forward the refill request to us. Please allow 3 business days for your refill to be completed.          Additional Information About Your Visit        [facility] [employer] Information     [employer] lets you send messages to your doctor, view your test results, renew your prescriptions, schedule appointments and more. To sign up, go to [email or web address] . Click on \"Log in\" on the left side of the " "screen, which will take you to the Welcome page. Then click on \"Sign up Now\" on the right side of the page.     You will be asked to enter the access code listed below, as well as some personal information. Please follow the directions to create your username and password.     Your access code is: [email or web address]  Expires: 2018  1:54 PM     Your access code will  in 90 days. If you need help or a new code, please call your [place] clinic or [phone].        Care EveryWhere ID     This is your Care EveryWhere ID. This could be used by other organizations to access your [place] medical records  [id number]        Your Vitals Were     Pulse Temperature Respirations Last Period Pulse Oximetry       132 98.5  F (36.9  C) (Temporal) 16 2011 91%        Blood Pressure from Last 3 Encounters:   18 116/68   18 90/52   18 104/62    Weight from Last 3 Encounters:   18 136 lb (61.7 kg)   17 135 lb 12.8 oz (61.6 kg)   17 134 lb (60.8 kg)              We Performed the Following     EKG 12-lead complete w/read - Clinics        Primary Care Provider Office Phone # Fax #    Silvia KING MD [facility] [phone]       [address] [facility] [address]  [facility] [address]        Equal Access to Services     [facility]: Hadii aad ku hadasho Soomaali, waaxda luqadaha, qaybta kaalmada adeegyada, martínez ramirez . So [facility] [phone].    ATENCIÓN: Si habla español, tiene a bernardo disposición servicios gratuitos de asistencia lingüística. Llame al [phone].    We comply with applicable federal civil rights laws and [place] laws. We do not discriminate on the basis of race, color, national origin, age, disability, sex, sexual orientation, or gender identity.            Thank you!     Thank you for choosing [facility]  for your care. Our goal is always to provide you with excellent care. Hearing back from our patients is one way we can " continue to improve our services. Please take a few minutes to complete the written survey that you may receive in the mail after your visit with us. Thank you!             Your Updated Medication List - Protect others around you: Learn how to safely use, store and throw away your medicines at www.disposemymeds.org.          This list is accurate as of 2/23/18 11:59 PM.  Always use your most recent med list.                   Brand Name Dispense Instructions for use Diagnosis    ACE/ARB/ARNI NOT PRESCRIBED (INTENTIONAL)      continuous prn. ACE & ARB not prescribed due to Other:BP controlled, no microalbuminuria        aspirin 81 MG tablet      Take 1 tablet by mouth daily.    Myalgia and myositis, unspecified       atorvastatin 10 MG tablet    LIPITOR    90 tablet    Take 1 tablet (10 mg) by mouth daily    Hyperlipidemia, unspecified       CALCIUM CITRATE + D PO      1,200 mg daily        eszopiclone 3 MG tablet    LUNESTA    90 tablet    Take 1 tablet (3 mg) by mouth nightly as needed    Insomnia, unspecified type       fluticasone-vilanterol 100-25 MCG/INH oral inhaler    BREO ELLIPTA     Inhale 1 puff into the lungs daily        gabapentin 300 MG capsule    NEURONTIN    360 capsule    TAKE ONE CAPSULE BY MOUTH FOUR TIMES A DAY    Postherpetic neuralgia       lidocaine 5 % Patch    LIDODERM    30 patch    Apply up to 3 patches to painful area at once for up to 12 h within a 24 h period.  Remove after 12 hours.    Other chronic pain, Postherpetic neuralgia       Lutein 6 MG Tabs      Take  by mouth daily.        metoprolol succinate 100 MG 24 hr tablet    TOPROL-XL    90 tablet    Take 1 tablet (100 mg) by mouth daily    Essential hypertension with goal blood pressure less than 140/90       morphine 30 MG 12 hr tablet   Start taking on:  3/19/2018    MS CONTIN    60 tablet    Take 1 tablet (30 mg) by mouth 2 times daily    Other chronic pain       MULTI-VITAMIN PO      once daily        naloxone auto-injector     EVZIO    0.8 mL    Inject 0.4 mLs (0.4 mg) into the muscle as needed for opioid reversal every 2-3 minutes until assistance arrives    Chronic, continuous use of opioids       * nystatin 445837 UNIT/GM Powd    NYSTOP    120 g    APPLY 1 DOSE TOPICALLY THREE TIMES DAILY AS NEEDED    Intertrigo       * nystatin cream    MYCOSTATIN    60 g    APPLY TOPICALLY DAILY AS NEEDED(RASH UNDER BREAST AND IN GROIN)    Intertrigo       oxyCODONE-acetaminophen 7.5-325 MG per tablet    PERCOCET    90 tablet    Take 1 tablet by mouth every 8 hours as needed for pain    Other chronic pain       ranitidine 150 MG tablet    ZANTAC    90 tablet    Take 1 tablet (150 mg) by mouth daily    Esophageal reflux       SENNA-GEN 8.6 MG tablet   Generic drug:  senna     120    2 TABLETS AT Twice daily    Unspecified constipation       * Notice:  This list has 2 medication(s) that are the same as other medications prescribed for you. Read the directions carefully, and ask your doctor or other care provider to review them with you.

## 2018-02-23 NOTE — PROGRESS NOTES
SUBJECTIVE:   Katherin Jonas is a 77 year old female who presents to clinic today for the following health issues:    HPI    Hospital Follow-up Visit:    Hospital/Nursing Home/IP Rehab Facility: [facility]  Date of Admission: 2/23/18  Date of Discharge: 2/25/18  Reason(s) for Admission: Atrial Fibrillation             Problems taking medications regularly:  None       Medication changes since discharge: Now on Eliquis, Incruse Ellipta (instead of Breo), increased Metoprolol to 100 mg BID, now on Omeprazole 20 mg daily (instead of Zantac), on Xopenex for rescue inhaler       Problems adhering to non-medication therapy:  None  Summary of hospitalization:  CareEverywhere information obtained and reviewed  Diagnostic Tests/Treatments reviewed.  Follow up needed: Patient seeing cardiology, will have Lexiscan this month.   Other Healthcare Providers Involved in Patient s Care:         Specialist appointment - Cardiology          Hospitalist recommended patient see a pulmonologist as well.   Update since discharge: improved. Patient reports she still feels tired and is breathing shallow. Her spouse reports he can tell she is breathing better. Patient is reporting increased swelling, especially in the left leg.     Post Discharge Medication Reconciliation: discharge medications reconciled and changed, per note/orders (see AVS).  Plan of care communicated with patient and family     Coding guidelines for this visit:  Type of Medical   Decision Making Face-to-Face Visit       within 7 Days of discharge Face-to-Face Visit        within 14 days of discharge   Moderate Complexity [id number]   High Complexity 68447 [id number]            Problem list and histories reviewed & adjusted, as indicated.  Additional history: as documented    Current Outpatient Prescriptions   Medication Sig Dispense Refill     metoprolol succinate (TOPROL-XL) 100 MG 24 hr tablet Take 1 tablet (100 mg) by mouth 2 times daily 90 tablet 3     omeprazole  20 MG tablet Take 20 mg by mouth daily       umeclidinium (INCRUSE ELLIPTA) 62.5 MCG/INH oral inhaler Inhale 1 puff into the lungs daily       levalbuterol (XOPENEX HFA) 45 MCG/ACT Inhaler Inhale 2 puffs into the lungs every 6 hours as needed for shortness of breath / dyspnea or wheezing 1 Inhaler 1     apixaban ANTICOAGULANT (ELIQUIS) 5 MG tablet Take 1 tablet (5 mg) by mouth 2 times daily       ASPIRIN NOT PRESCRIBED (INTENTIONAL) Please choose reason not prescribed, below 0 each 0     hydrochlorothiazide (HYDRODIURIL) 25 MG tablet Take 0.5 tablets (12.5 mg) by mouth daily 45 tablet 0     oxyCODONE-acetaminophen (PERCOCET) 7.5-325 MG per tablet Take 1 tablet by mouth every 8 hours as needed for pain 90 tablet 0     [START ON 3/19/2018] morphine (MS CONTIN) 30 MG 12 hr tablet Take 1 tablet (30 mg) by mouth 2 times daily 60 tablet 0     gabapentin (NEURONTIN) 300 MG capsule TAKE ONE CAPSULE BY MOUTH FOUR TIMES A  capsule 3     eszopiclone (LUNESTA) 3 MG tablet Take 1 tablet (3 mg) by mouth nightly as needed 90 tablet 1     atorvastatin (LIPITOR) 10 MG tablet Take 1 tablet (10 mg) by mouth daily 90 tablet 3     Lutein 6 MG TABS Take  by mouth daily.       MULTI-VITAMIN OR once daily        CALCIUM CITRATE + D OR 1,200 mg daily        naloxone (EVZIO) auto-injector Inject 0.4 mLs (0.4 mg) into the muscle as needed for opioid reversal every 2-3 minutes until assistance arrives 0.8 mL 0     [DISCONTINUED] metoprolol (TOPROL-XL) 100 MG 24 hr tablet Take 1 tablet (100 mg) by mouth daily 90 tablet 3     nystatin (NYSTOP) 889484 UNIT/GM POWD APPLY 1 DOSE TOPICALLY THREE TIMES DAILY AS NEEDED 120 g 11     nystatin (MYCOSTATIN) cream APPLY TOPICALLY DAILY AS NEEDED(RASH UNDER BREAST AND IN GROIN) 60 g 11     lidocaine (LIDODERM) 5 % Patch Apply up to 3 patches to painful area at once for up to 12 h within a 24 h period.  Remove after 12 hours. 30 patch 11     ACE/ARB NOT PRESCRIBED, INTENTIONAL, continuous prn. ACE &  ARB not prescribed due to Other:BP controlled, no microalbuminuria       SENNA-GEN 8.6 MG OR TABS 2 TABLETS AT Twice daily 120 prn     BP Readings from Last 3 Encounters:   03/02/18 126/70   02/23/18 116/68   02/16/18 90/52    Wt Readings from Last 3 Encounters:   01/19/18 136 lb (61.7 kg)   12/12/17 135 lb 12.8 oz (61.6 kg)   09/19/17 134 lb (60.8 kg)           ROS:  CONSTITUTIONAL: POSITIVE for fatigue and myalgias, overall doing better  RESP: POSITIVE for SOB/dyspnea - not using oxygen at night, does not use as often as prescribed because she doesn't always think she needs it. Hx COPD   CV: Peripheral edema - worse in left foot/leg. NEGATIVE for chest pain, palpitations  MUSCULOSKELETAL: POSITIVE  for arthralgias and myalgia    OBJECTIVE:     /70 (BP Location: Left arm, Patient Position: Chair, Cuff Size: Adult Regular)  Pulse 72  Temp 98.5  F (36.9  C) (Temporal)  Resp 16  LMP 02/01/2011  SpO2 93%  There is no height or weight on file to calculate BMI.  Gen: no apparent distress  NECK: no adenopathy, no asymmetry, no masses  Chest: clear to auscultation without wheeze, rale or rhonchi  Cor: regular rate and rhythm without murmur  ABDOMEN: soft, nontender, no masses and bowel sounds normal  Ext: warm and dry without edema  Neuro: Using wheelchair in the clinic  Psych: Alert and oriented times 3; coherent speech, normal   rate and volume, able to articulate logical thoughts, able   to abstract reason, no tangential thoughts, no hallucinations   or delusions  Her affect is neutral       ASSESSMENT/PLAN:     1. Paroxysmal atrial fibrillation (H)  Patient was hospitalized last week for new-onset atrial fibrillation. She was started on Eliquis and higher dose of metoprolol. She followed up with cardiology (see Care Everywhere) who noted she was in normal sinus rhythm. They ordered an echocardiogram and Lexiscan, and she will follow up with them in 10 days. Patient reports some improvement in her general  well-being, possibly due to rate-control. She currently has a regular rate here.     2. Chronic obstructive pulmonary disease, unspecified COPD type (H)  Medication changes occurred after hospitalization last week. She was changed from Breo to Incruse Ellipta (which is less expensive). She is also on Xopenex for rescue inhaler which she states she has not had to use. Patient does not use her oxygen overnight and was not using it today while in clinic. She reports she does not think she needs it at those times. Encouraged patient to continue with oxygen, especially when active, as hypoxia can put stress on her heart and may trigger atrial fibrillation. With recent influenza and decrease in respiratory status, patient should be seen by pulmonology as recommended by hospitalist.   - PULMONARY MEDICINE REFERRAL    3. Essential hypertension with goal blood pressure less than 140/90  Appears controlled on metoprolol, however patient is reporting increased swelling in her feet, especially her left foot. Will restart HCTZ at 12.5 mg (half of previous dose), as suggested by her cardiologist.   - metoprolol succinate (TOPROL-XL) 100 MG 24 hr tablet; Take 1 tablet (100 mg) by mouth 2 times daily  Dispense: 90 tablet; Refill: 3  - ASPIRIN NOT PRESCRIBED (INTENTIONAL); Please choose reason not prescribed, below  Dispense: 0 each; Refill: 0    4. Other chronic pain  Patient endorses continued pain, although she acknowledges it has been hard to tell what is her chronic pain and what is malaise and myalgias from influenza.  She feels she has adjusted to the decrease in her Percocet.  We will continue to slowly taper her down on her narcotic medicines, although we will make no changes at today's appointment. Patient to return to clinic in April for her regular pain medication visit.  She inquires about medical marijuana and I discussed that I do not certify people for this.  But I could get her to a pain clinic to further discuss  her pain issues.  Apparently she did have some hallucinations in the hospital and we discussed how opioid medications could contribute to this.      FUTURE APPOINTMENTS:       - Follow-up visit in 4-6 weeks    This document serves as a record of the services and decisions personally performed and made by Silvia Matthews MD.  It was created on his/her behalf by Ana María Ventura, a trained medical student and scribe.  The creation of this record is based on the provider's personal observations and the statements of the patient.     Ana María Ventura, MS3  March 2, 2018    This patient was seen and examined by myself as well as the medical student.  The medical student has scribed the note and I have reviewed it, edited it appropriately and agree with the final documentation. Electronically signed by Silvia Matthews MD on 3/4/2018    [facility]

## 2018-02-23 NOTE — NURSING NOTE
"Chief Complaint   Patient presents with     RECHECK     Panel Management     ahsan carter, phq9       Initial /68 (BP Location: Left arm, Patient Position: Chair, Cuff Size: Adult Regular)  Pulse 132  Temp 98.5  F (36.9  C) (Temporal)  Resp 16  LMP 02/01/2011  SpO2 91% Estimated body mass index is 27.75 kg/(m^2) as calculated from the following:    Height as of 9/19/17: 4' 10.7\" (1.491 m).    Weight as of 1/19/18: 136 lb (61.7 kg).  Medication Reconciliation: complete   Kirsten Jaffe, CMA      "

## 2018-02-24 LAB — GLUCOSE SERPL-MCNC: 94 MG/DL (ref 70–110)

## 2018-02-24 ASSESSMENT — PATIENT HEALTH QUESTIONNAIRE - PHQ9: SUM OF ALL RESPONSES TO PHQ QUESTIONS 1-9: 8

## 2018-02-24 ASSESSMENT — ANXIETY QUESTIONNAIRES: GAD7 TOTAL SCORE: 13

## 2018-02-25 LAB
CREAT SERPL-MCNC: 0.66 MG/DL (ref 0.55–1.02)
GFR SERPL CREATININE-BSD FRML MDRD: >60 ML/MIN
POTASSIUM SERPL-SCNC: 4.6 MMOL/L (ref 3.5–5.1)

## 2018-02-28 ENCOUNTER — TRANSFERRED RECORDS (OUTPATIENT)
Dept: HEALTH INFORMATION MANAGEMENT | Facility: CLINIC | Age: 78
End: 2018-02-28

## 2018-03-02 ENCOUNTER — OFFICE VISIT (OUTPATIENT)
Dept: FAMILY MEDICINE | Facility: OTHER | Age: 78
End: 2018-03-02
Payer: COMMERCIAL

## 2018-03-02 VITALS
OXYGEN SATURATION: 93 % | SYSTOLIC BLOOD PRESSURE: 126 MMHG | DIASTOLIC BLOOD PRESSURE: 70 MMHG | HEART RATE: 72 BPM | RESPIRATION RATE: 16 BRPM | TEMPERATURE: 98.5 F

## 2018-03-02 DIAGNOSIS — J44.9 CHRONIC OBSTRUCTIVE PULMONARY DISEASE, UNSPECIFIED COPD TYPE (H): ICD-10-CM

## 2018-03-02 DIAGNOSIS — G89.29 OTHER CHRONIC PAIN: ICD-10-CM

## 2018-03-02 DIAGNOSIS — I48.0 PAROXYSMAL ATRIAL FIBRILLATION (H): Primary | ICD-10-CM

## 2018-03-02 DIAGNOSIS — I10 ESSENTIAL HYPERTENSION WITH GOAL BLOOD PRESSURE LESS THAN 140/90: ICD-10-CM

## 2018-03-02 PROBLEM — I21.A1 NON-ST ELEVATION MYOCARDIAL INFARCTION (NSTEMI), TYPE 2: Status: RESOLVED | Noted: 2018-02-16 | Resolved: 2018-03-02

## 2018-03-02 PROCEDURE — 99214 OFFICE O/P EST MOD 30 MIN: CPT | Performed by: FAMILY MEDICINE

## 2018-03-02 RX ORDER — HYDROCHLOROTHIAZIDE 25 MG/1
25 TABLET ORAL DAILY
Qty: 45 TABLET | Refills: 0 | COMMUNITY
Start: 2018-03-02 | End: 2018-03-27

## 2018-03-02 RX ORDER — NICOTINE POLACRILEX 4 MG/1
20 GUM, CHEWING ORAL DAILY
COMMUNITY
End: 2018-07-27

## 2018-03-02 RX ORDER — METOPROLOL SUCCINATE 100 MG/1
50 TABLET, EXTENDED RELEASE ORAL DAILY
Qty: 90 TABLET | Refills: 3 | COMMUNITY
Start: 2018-03-02 | End: 2018-08-14

## 2018-03-02 RX ORDER — LEVALBUTEROL TARTRATE 45 UG/1
2 AEROSOL, METERED ORAL EVERY 6 HOURS PRN
Qty: 1 INHALER | Refills: 1 | COMMUNITY
Start: 2018-03-02 | End: 2018-04-27

## 2018-03-02 NOTE — MR AVS SNAPSHOT
After Visit Summary   3/2/2018    Katherin Jonas    MRN: [id number]           Patient Information     Date Of Birth          1940        Visit Information        Provider Department      3/2/2018 1:20 PM Silvia Matthews MD [facility]        Today's Diagnoses     Paroxysmal atrial fibrillation (H)    -  1    Chronic obstructive pulmonary disease, unspecified COPD type (H)        Essential hypertension with goal blood pressure less than 140/90           Follow-ups after your visit        Additional Services     PULMONARY MEDICINE REFERRAL       Your provider has referred you to: [facility] [phone]   [email or web address]  UMP: [facility] [place] [phone]   [email or web address]    Please be aware that coverage of these services is subject to the terms and limitations of your health insurance plan.  Call member services at your health plan with any benefit or coverage questions.      Please bring the following with you to your appointment:    (1) Any X-Rays, CTs or MRIs which have been performed.  Contact the facility where they were done to arrange for  prior to your scheduled appointment.    (2) List of current medications   (3) This referral request   (4) Any documents/labs given to you for this referral                  Who to contact     If you have questions or need follow up information about today's clinic visit or your schedule please contact [facility] directly at [phone].  Normal or non-critical lab and imaging results will be communicated to you by MyChart, letter or phone within 4 business days after the clinic has received the results. If you do not hear from us within 7 days, please contact the clinic through MyChart or phone. If you have a critical or abnormal lab  "result, we will notify you by phone as soon as possible.  Submit refill requests through [employer] or call your pharmacy and they will forward the refill request to us. Please allow 3 business days for your refill to be completed.          Additional Information About Your Visit        [employer] Information     [employer] lets you send messages to your doctor, view your test results, renew your prescriptions, schedule appointments and more. To sign up, go to [email or web address] . Click on \"Log in\" on the left side of the screen, which will take you to the Welcome page. Then click on \"Sign up Now\" on the right side of the page.     You will be asked to enter the access code listed below, as well as some personal information. Please follow the directions to create your username and password.     Your access code is: [email or web address]  Expires: 2018  1:54 PM     Your access code will  in 90 days. If you need help or a new code, please call your [place] clinic or [phone].        Care EveryWhere ID     This is your Care EveryWhere ID. This could be used by other organizations to access your [place] medical records  [id number]        Your Vitals Were     Pulse Temperature Respirations Last Period Pulse Oximetry       72 98.5  F (36.9  C) (Temporal) 16 2011 93%        Blood Pressure from Last 3 Encounters:   18 126/70   18 116/68   18 90/52    Weight from Last 3 Encounters:   18 136 lb (61.7 kg)   17 135 lb 12.8 oz (61.6 kg)   17 134 lb (60.8 kg)              We Performed the Following     PULMONARY MEDICINE REFERRAL          Today's Medication Changes          These changes are accurate as of 3/2/18  2:01 PM.  If you have any questions, ask your nurse or doctor.               These medicines have changed or have updated prescriptions.        Dose/Directions    metoprolol succinate 100 MG 24 hr tablet   Commonly known as:  TOPROL-XL   This may have changed:  when to " take this   Used for:  Essential hypertension with goal blood pressure less than 140/90   Changed by:  Silvia Matthews MD        Dose:  100 mg   Take 1 tablet (100 mg) by mouth 2 times daily   Quantity:  90 tablet   Refills:  3                Primary Care Provider Office Phone # Fax #    Silvia Matthews [phone]       [address] [facility] [address]  [facility] [address]        Equal Access to Services     [facility] AH: Hadii aad ku hadasho Soomaali, waaxda luqadaha, qaybta kaalmada adeegyada, waxay idiin hayaan adeeg kharash lakt . So [facility] [phone].    ATENCIÓN: Si habla espron, tiene a bernardo disposición servicios gratuitos de asistencia lingüística. Llame al [phone].    We comply with applicable federal civil rights laws and [place] laws. We do not discriminate on the basis of race, color, national origin, age, disability, sex, sexual orientation, or gender identity.            Thank you!     Thank you for choosing [facility]  for your care. Our goal is always to provide you with excellent care. Hearing back from our patients is one way we can continue to improve our services. Please take a few minutes to complete the written survey that you may receive in the mail after your visit with us. Thank you!             Your Updated Medication List - Protect others around you: Learn how to safely use, store and throw away your medicines at www.disposemymeds.org.          This list is accurate as of 3/2/18  2:01 PM.  Always use your most recent med list.                   Brand Name Dispense Instructions for use Diagnosis    ACE/ARB/ARNI NOT PRESCRIBED (INTENTIONAL)      continuous prn. ACE & ARB not prescribed due to Other:BP controlled, no microalbuminuria        apixaban ANTICOAGULANT 5 MG tablet    ELIQUIS     Take 1 tablet (5 mg) by mouth 2 times daily        ASPIRIN NOT PRESCRIBED    INTENTIONAL    0 each    Please choose reason not prescribed, below    Essential  hypertension with goal blood pressure less than 140/90       atorvastatin 10 MG tablet    LIPITOR    90 tablet    Take 1 tablet (10 mg) by mouth daily    Hyperlipidemia, unspecified       CALCIUM CITRATE + D PO      1,200 mg daily        eszopiclone 3 MG tablet    LUNESTA    90 tablet    Take 1 tablet (3 mg) by mouth nightly as needed    Insomnia, unspecified type       gabapentin 300 MG capsule    NEURONTIN    360 capsule    TAKE ONE CAPSULE BY MOUTH FOUR TIMES A DAY    Postherpetic neuralgia       hydrochlorothiazide 25 MG tablet    HYDRODIURIL    45 tablet    Take 0.5 tablets (12.5 mg) by mouth daily        levalbuterol 45 MCG/ACT Inhaler    XOPENEX HFA    1 Inhaler    Inhale 2 puffs into the lungs every 6 hours as needed for shortness of breath / dyspnea or wheezing        lidocaine 5 % Patch    LIDODERM    30 patch    Apply up to 3 patches to painful area at once for up to 12 h within a 24 h period.  Remove after 12 hours.    Other chronic pain, Postherpetic neuralgia       Lutein 6 MG Tabs      Take  by mouth daily.        metoprolol succinate 100 MG 24 hr tablet    TOPROL-XL    90 tablet    Take 1 tablet (100 mg) by mouth 2 times daily    Essential hypertension with goal blood pressure less than 140/90       morphine 30 MG 12 hr tablet   Start taking on:  3/19/2018    MS CONTIN    60 tablet    Take 1 tablet (30 mg) by mouth 2 times daily    Other chronic pain       MULTI-VITAMIN PO      once daily        naloxone auto-injector    EVZIO    0.8 mL    Inject 0.4 mLs (0.4 mg) into the muscle as needed for opioid reversal every 2-3 minutes until assistance arrives    Chronic, continuous use of opioids       * nystatin 116710 UNIT/GM Powd    NYSTOP    120 g    APPLY 1 DOSE TOPICALLY THREE TIMES DAILY AS NEEDED    Intertrigo       * nystatin cream    MYCOSTATIN    60 g    APPLY TOPICALLY DAILY AS NEEDED(RASH UNDER BREAST AND IN GROIN)    Intertrigo       omeprazole 20 MG tablet      Take 20 mg by mouth daily         oxyCODONE-acetaminophen 7.5-325 MG per tablet    PERCOCET    90 tablet    Take 1 tablet by mouth every 8 hours as needed for pain    Other chronic pain       SENNA-GEN 8.6 MG tablet   Generic drug:  senna     120    2 TABLETS AT Twice daily    Unspecified constipation       umeclidinium 62.5 MCG/INH oral inhaler    INCRUSE ELLIPTA     Inhale 1 puff into the lungs daily        * Notice:  This list has 2 medication(s) that are the same as other medications prescribed for you. Read the directions carefully, and ask your doctor or other care provider to review them with you.

## 2018-03-02 NOTE — NURSING NOTE
"Chief Complaint   Patient presents with     RECHECK     Panel Management     height, phq9       Initial /70 (BP Location: Left arm, Patient Position: Chair, Cuff Size: Adult Regular)  Pulse 72  Temp 98.5  F (36.9  C) (Temporal)  Resp 16  LMP 02/01/2011  SpO2 93% Estimated body mass index is 27.75 kg/(m^2) as calculated from the following:    Height as of 9/19/17: 4' 10.7\" (1.491 m).    Weight as of 1/19/18: 136 lb (61.7 kg).  Medication Reconciliation: MD please review with patient   Kirsten Jaffe CMA      "

## 2018-03-06 ENCOUNTER — OFFICE VISIT (OUTPATIENT)
Dept: FAMILY MEDICINE | Facility: OTHER | Age: 78
End: 2018-03-06
Payer: COMMERCIAL

## 2018-03-06 VITALS
SYSTOLIC BLOOD PRESSURE: 152 MMHG | TEMPERATURE: 98.7 F | RESPIRATION RATE: 16 BRPM | OXYGEN SATURATION: 98 % | DIASTOLIC BLOOD PRESSURE: 74 MMHG | HEART RATE: 80 BPM

## 2018-03-06 DIAGNOSIS — F41.9 ANXIETY: Primary | ICD-10-CM

## 2018-03-06 DIAGNOSIS — I10 HYPERTENSION GOAL BP (BLOOD PRESSURE) < 140/90: ICD-10-CM

## 2018-03-06 PROCEDURE — 99214 OFFICE O/P EST MOD 30 MIN: CPT | Performed by: FAMILY MEDICINE

## 2018-03-06 RX ORDER — CITALOPRAM HYDROBROMIDE 20 MG/1
TABLET ORAL
Qty: 30 TABLET | Refills: 1 | Status: SHIPPED | OUTPATIENT
Start: 2018-03-06 | End: 2018-03-27

## 2018-03-06 NOTE — NURSING NOTE
"Chief Complaint   Patient presents with     Personal       Initial /74 (BP Location: Right arm, Patient Position: Chair, Cuff Size: Adult Regular)  Pulse 80  Temp 98.7  F (37.1  C) (Temporal)  Resp 16  LMP 02/01/2011  SpO2 98% Estimated body mass index is 27.75 kg/(m^2) as calculated from the following:    Height as of 9/19/17: 4' 10.7\" (1.491 m).    Weight as of 1/19/18: 136 lb (61.7 kg).  Medication Reconciliation: complete   Kirsten Jaffe CMA      "

## 2018-03-06 NOTE — PROGRESS NOTES
SUBJECTIVE:   Katherin Jonas is a 77 year old female who presents to clinic today for the following health issues:      HPI     Patient feels that she is on the verge of a nervous breakdown.   Her Celexa had been stopped after her numerous hospitalizations at.  She estimates she has been off of this for about the last month.  With her severity of her illness and new onset atrial fibrillation she is feeling overwhelmed.  She is feeling much more anxious especially over the last 2 days.  Her  is with her who also agrees that anxiety level has dramatically increased.    Problem list and histories reviewed & adjusted, as indicated.  Additional history: as documented    Patient Active Problem List   Diagnosis     Esophageal reflux     Carotid atherosclerosis     CKD (chronic kidney disease) stage 3, GFR 30-59 ml/min     Insomnia     Mild major depression (H)     Advanced directives, counseling/discussion     Hypertension goal BP (blood pressure) < 140/90     Hyperlipidemia, unspecified     Scoliosis     Osteopenia     Postherpetic neuralgia     Other chronic pain     COPD (chronic obstructive pulmonary disease) (H)     Paroxysmal atrial fibrillation (H)     Past Surgical History:   Procedure Laterality Date     C FULL ROUT OBSTE CARE, DELIV  1964     C FULL ROUT OBSTE CARE, DELIV       C FULL ROUT OBSTE CARE, DELIV  1970     C TOTAL KNEE ARTHROPLASTY  1997    left     C TOTAL KNEE ARTHROPLASTY  2005    right     C TREAT ECTOPIC PREG,RMV TUBE/OVARY  1967    left     COLONOSCOPY  11    [facility]     HC REPAIR OF NASAL SEPTUM         Social History   Substance Use Topics     Smoking status: Former Smoker     Quit date: 1979     Smokeless tobacco: Never Used      Comment: no smokers in household     Alcohol use No      Comment: none since 2006     Family History   Problem Relation Age of Onset     CANCER Daughter      Hypertension Mother             ROS:  CONSTITUTIONAL: NEGATIVE for fever, chills  ENT/MOUTH: NEGATIVE for ear, mouth and throat problems  RESP: NEGATIVE for significant cough  CV: NEGATIVE for chest pain, palpitations.  Swelling has slightly improved with addition of her half tab of hydrochlorothiazide.    OBJECTIVE:     /74 (BP Location: Right arm, Patient Position: Chair, Cuff Size: Adult Regular)  Pulse 80  Temp 98.7  F (37.1  C) (Temporal)  Resp 16  LMP 02/01/2011  SpO2 98%  There is no height or weight on file to calculate BMI.  Gen: no apparent distress  Chest: clear to auscultation without wheeze, rale or rhonchi  Cor: regular rate and rhythm without murmur  Ext: warm and dry with 1-2+ edema  Psych: Alert and oriented times 3; coherent speech, normal   rate and volume, able to articulate logical thoughts, able   to abstract reason, no tangential thoughts, no hallucinations   or delusions  Her affect is anxious     ASSESSMENT/PLAN:     1. Anxiety  I suspect her anxiety is the result of her cessation of her Celexa.  When she is on Celexa her mood was stable and her  agrees with this.  Will restart Celexa at 10 mg daily for 6 days and then increase to 20 mg daily.  We will have her follow-up in 1 month.  I suspect her elevated blood pressure today is related to her anxiety and will recheck that next month as well    - citalopram (CELEXA) 20 MG tablet; Take 1/2 tab daily for 6 days, then take 1 daily  Dispense: 30 tablet; Refill: 1      2. Hypertension goal BP (blood pressure) < 140/90  She feels her swelling is slightly improved with the addition of hydrochlorothiazide.  We will increase this to a full tablet daily.  Will recheck at her follow-up visit and will recheck a basic metabolic panel at that time.    Silvia Matthews MD  [facility]

## 2018-03-06 NOTE — MR AVS SNAPSHOT
"              After Visit Summary   3/6/2018    Katherin Jonas    MRN: [id number]           Patient Information     Date Of Birth          1940        Visit Information        Provider Department      3/6/2018 11:00 AM Silvia Matthews MD [facility]        Today's Diagnoses     Anxiety    -  1       Follow-ups after your visit        Your next 10 appointments already scheduled     Apr 27, 2018  8:00 AM CDT   [facility]    [address] [place] [address] [phone]              Who to contact     If you have questions or need follow up information about today's clinic visit or your schedule please contact [facility] directly at [phone].  Normal or non-critical lab and imaging results will be communicated to you by MyChart, letter or phone within 4 business days after the clinic has received the results. If you do not hear from us within 7 days, please contact the clinic through MyChart or phone. If you have a critical or abnormal lab result, we will notify you by phone as soon as possible.  Submit refill requests through [employer] or call your pharmacy and they will forward the refill request to us. Please allow 3 business days for your refill to be completed.          Additional Information About Your Visit        MyChart Information     [employer] lets you send messages to your doctor, view your test results, renew your prescriptions, schedule appointments and more. To sign up, go to [email or web address] . Click on \"Log in\" on the left side of the screen, which will take you to the Welcome page. Then click on \"Sign up Now\" on the right side of the page.     You will be asked to enter the access code listed below, as well as some personal information. Please follow the directions to create your username and password.     Your access code is: [email or web address]  Expires: " 2018  1:54 PM     Your access code will  in 90 days. If you need help or a new code, please call your [place] clinic or [phone].        Care EveryWhere ID     This is your Care EveryWhere ID. This could be used by other organizations to access your [place] medical records  [id number]        Your Vitals Were     Pulse Temperature Respirations Last Period Pulse Oximetry       80 98.7  F (37.1  C) (Temporal) 16 2011 98%        Blood Pressure from Last 3 Encounters:   18 152/74   18 126/70   18 116/68    Weight from Last 3 Encounters:   18 136 lb (61.7 kg)   17 135 lb 12.8 oz (61.6 kg)   17 134 lb (60.8 kg)              Today, you had the following     No orders found for display         Today's Medication Changes          These changes are accurate as of 3/6/18 11:26 AM.  If you have any questions, ask your nurse or doctor.               Start taking these medicines.        Dose/Directions    citalopram 20 MG tablet   Commonly known as:  celeXA   Used for:  Anxiety   Started by:  Silvia Matthews MD        Take 1/2 tab daily for 6 days, then take 1 daily   Quantity:  30 tablet   Refills:  1            Where to get your medicines      These medications were sent to [facility] [address] [place] [address] [address] AT [facility] & [place] [address], [facility] [address]     Phone:  [phone]     citalopram 20 MG tablet                Primary Care Provider Office Phone # Fax #    Silvia Matthews [phone]       [address] [place] [address]        Equal Access to Services     PAMELA HUERTA AH: Gopi Ireland, isrrael marcial, alda laurent, martínez molina. So [facility] [phone].    ATENCIÓN: Si habla español, tiene a bernardo disposición servicios gratuitos de asistencia lingüística. Llame al [phone].    We comply with applicable  federal civil rights laws and [place] laws. We do not discriminate on the basis of race, color, national origin, age, disability, sex, sexual orientation, or gender identity.            Thank you!     Thank you for choosing [facility]  for your care. Our goal is always to provide you with excellent care. Hearing back from our patients is one way we can continue to improve our services. Please take a few minutes to complete the written survey that you may receive in the mail after your visit with us. Thank you!             Your Updated Medication List - Protect others around you: Learn how to safely use, store and throw away your medicines at www.disposemymeds.org.          This list is accurate as of 3/6/18 11:26 AM.  Always use your most recent med list.                   Brand Name Dispense Instructions for use Diagnosis    ACE/ARB/ARNI NOT PRESCRIBED (INTENTIONAL)      continuous prn. ACE & ARB not prescribed due to Other:BP controlled, no microalbuminuria        apixaban ANTICOAGULANT 5 MG tablet    ELIQUIS     Take 1 tablet (5 mg) by mouth 2 times daily        ASPIRIN NOT PRESCRIBED    INTENTIONAL    0 each    Please choose reason not prescribed, below    Essential hypertension with goal blood pressure less than 140/90       atorvastatin 10 MG tablet    LIPITOR    90 tablet    Take 1 tablet (10 mg) by mouth daily    Hyperlipidemia, unspecified       CALCIUM CITRATE + D PO      1,200 mg daily        citalopram 20 MG tablet    celeXA    30 tablet    Take 1/2 tab daily for 6 days, then take 1 daily    Anxiety       eszopiclone 3 MG tablet    LUNESTA    90 tablet    Take 1 tablet (3 mg) by mouth nightly as needed    Insomnia, unspecified type       gabapentin 300 MG capsule    NEURONTIN    360 capsule    TAKE ONE CAPSULE BY MOUTH FOUR TIMES A DAY    Postherpetic neuralgia       hydrochlorothiazide 25 MG tablet    HYDRODIURIL    45 tablet    Take 25 mg by mouth daily        levalbuterol 45 MCG/ACT  Inhaler    XOPENEX HFA    1 Inhaler    Inhale 2 puffs into the lungs every 6 hours as needed for shortness of breath / dyspnea or wheezing        lidocaine 5 % Patch    LIDODERM    30 patch    Apply up to 3 patches to painful area at once for up to 12 h within a 24 h period.  Remove after 12 hours.    Other chronic pain, Postherpetic neuralgia       Lutein 6 MG Tabs      Take  by mouth daily.        metoprolol succinate 100 MG 24 hr tablet    TOPROL-XL    90 tablet    Take 1 tablet (100 mg) by mouth 2 times daily    Essential hypertension with goal blood pressure less than 140/90       morphine 30 MG 12 hr tablet   Start taking on:  3/19/2018    MS CONTIN    60 tablet    Take 1 tablet (30 mg) by mouth 2 times daily    Other chronic pain       MULTI-VITAMIN PO      once daily        naloxone auto-injector    EVZIO    0.8 mL    Inject 0.4 mLs (0.4 mg) into the muscle as needed for opioid reversal every 2-3 minutes until assistance arrives    Chronic, continuous use of opioids       * nystatin 175755 UNIT/GM Powd    NYSTOP    120 g    APPLY 1 DOSE TOPICALLY THREE TIMES DAILY AS NEEDED    Intertrigo       * nystatin cream    MYCOSTATIN    60 g    APPLY TOPICALLY DAILY AS NEEDED(RASH UNDER BREAST AND IN GROIN)    Intertrigo       omeprazole 20 MG tablet      Take 20 mg by mouth daily        oxyCODONE-acetaminophen 7.5-325 MG per tablet    PERCOCET    90 tablet    Take 1 tablet by mouth every 8 hours as needed for pain    Other chronic pain       SENNA-GEN 8.6 MG tablet   Generic drug:  senna     120    2 TABLETS AT Twice daily    Unspecified constipation       umeclidinium 62.5 MCG/INH oral inhaler    INCRUSE ELLIPTA     Inhale 1 puff into the lungs daily        * Notice:  This list has 2 medication(s) that are the same as other medications prescribed for you. Read the directions carefully, and ask your doctor or other care provider to review them with you.

## 2018-03-16 ENCOUNTER — TRANSFERRED RECORDS (OUTPATIENT)
Dept: HEALTH INFORMATION MANAGEMENT | Facility: CLINIC | Age: 78
End: 2018-03-16

## 2018-03-22 NOTE — PROGRESS NOTES
SUBJECTIVE:   Katherin Jonas is a 77 year old female who presents to clinic today for the following health issues:      History of Present Illness     Depression & Anxiety Follow-up:     Depression/Anxiety:  Depression & Anxiety    Status since last visit:: slightly better but not very much.    Other associated symptoms of depression and anxiety::  None    Significant life event::  YES (When she was seen by cardiologist she was told that she may not have had the flu but had acutally had a heart attack)    Current substance use::  None       Today's PHQ-9         PHQ-9 Total Score:         PHQ-9 Q9 Suicidal ideation:       Thoughts of suicide or self harm:      Self-harm Plan:        Self-harm Action:          Safety concerns for self or others:            She does not feel that the Celexa has been as helpful as it has in the past.  She feels that it has taken the edge off however she still feels unhappy, irritable and just not herself.  She remembers taking Zoloft in the past and did not think it helped her.  She also remembers taking nortriptyline and amitriptyline and was unhappy with the weight gain.    She is concerned about being on Lasix by the cardiologist that she is wondering if it is too strong a dose.  We discussed that her weight is coming down.  She does have a lot of lower extremity swelling left but it is improved.  I discussed that this is a fairly common dose and she felt reassured.    She was using lavender Epson salts to soak her feet and developed a rash.  She stopped it Lavender salts and is using 1% hydrocortisone cream but the rash persists.    Problem list and histories reviewed & adjusted, as indicated.  Additional history: as documented    Patient Active Problem List   Diagnosis     Esophageal reflux     Carotid atherosclerosis     CKD (chronic kidney disease) stage 3, GFR 30-59 ml/min     Insomnia     Mild major depression (H)     Advanced directives, counseling/discussion     Essential  hypertension     Hyperlipidemia, unspecified     Scoliosis     Osteopenia     Postherpetic neuralgia     Other chronic pain     COPD (chronic obstructive pulmonary disease) (H)     Paroxysmal atrial fibrillation (H)     Anxiety     Past Surgical History:   Procedure Laterality Date     C FULL ROUT OBSTE CARE, DELIV       C FULL ROUT OBSTE CARE, DELIV       C FULL ROUT OBSTE CARE, DELIV       C TOTAL KNEE ARTHROPLASTY  1997    left     C TOTAL KNEE ARTHROPLASTY  2005    right     C TREAT ECTOPIC PREG,RMV TUBE/OVARY  1967    left     COLONOSCOPY  11    [facility] REPAIR OF NASAL SEPTUM         Social History   Substance Use Topics     Smoking status: Former Smoker     Quit date: 1979     Smokeless tobacco: Never Used      Comment: no smokers in household     Alcohol use No      Comment: none since      Family History   Problem Relation Age of Onset     CANCER Daughter      Hypertension Mother            ROS:  CONSTITUTIONAL: NEGATIVE for fever, chills  RESP: NEGATIVE for significant cough or SOB  CV: NEGATIVE for chest pain.  She has had a few palpitations.    OBJECTIVE:     /66 (BP Location: Right arm, Patient Position: Chair, Cuff Size: Adult Regular)  Pulse 60  Temp 97.9  F (36.6  C) (Temporal)  Resp 16  Wt 129 lb (58.5 kg)  LMP 2011  SpO2 91%  BMI 26.32 kg/m2  Body mass index is 26.32 kg/(m^2).  Gen: no apparent distress  Chest: clear to auscultation without wheeze, rale or rhonchi  Cor: Irregularly irregular rate and rhythm  ABDOMEN: soft, nontender, no masses and bowel sounds normal  Ext: warm and dry with 2+ bilateral lower extremity edema  Skin: Left foot on the side and dorsum there are dry, slightly raised, red patches with mild flaking.   Psych: Alert and oriented times 3; coherent speech, normal   rate and volume, able to articulate logical thoughts, able   to abstract reason, no tangential thoughts,  no hallucinations   or delusions  Her affect is neutral      ASSESSMENT/PLAN:     1. Mild major depression (H)  We will stop her Celexa and start fluoxetine.  Will have her follow-up in 3 weeks.    - FLUoxetine (PROZAC) 20 MG capsule; Take 1 capsule (20 mg) by mouth daily  Dispense: 30 capsule; Refill: 1    2. Hypertension goal BP (blood pressure) < 150/90  Following closely with cardiology.  Will recheck at next visit.    3. Dermatitis  Will try triamcinolone cream.    - triamcinolone (KENALOG) 0.1 % cream; Apply sparingly to affected area three times daily for 14 days.  Dispense: 15 g; Refill: 0    4. Insomnia, unspecified type  She has long struggled with insomnia.  She has tried multiple other medications without improvement.  She has been stable on Lunesta 3 mg for several years.    - eszopiclone (LUNESTA) 3 MG tablet; Take 1 tablet (3 mg) by mouth nightly as needed  Dispense: 90 tablet; Refill: 1    Silvia Matthews MD  [facility]

## 2018-03-27 ENCOUNTER — OFFICE VISIT (OUTPATIENT)
Dept: FAMILY MEDICINE | Facility: OTHER | Age: 78
End: 2018-03-27
Payer: COMMERCIAL

## 2018-03-27 VITALS
HEART RATE: 60 BPM | RESPIRATION RATE: 16 BRPM | TEMPERATURE: 97.9 F | BODY MASS INDEX: 26.32 KG/M2 | OXYGEN SATURATION: 91 % | WEIGHT: 129 LBS | SYSTOLIC BLOOD PRESSURE: 158 MMHG | DIASTOLIC BLOOD PRESSURE: 66 MMHG

## 2018-03-27 DIAGNOSIS — L30.9 DERMATITIS: ICD-10-CM

## 2018-03-27 DIAGNOSIS — I10 ESSENTIAL HYPERTENSION: ICD-10-CM

## 2018-03-27 DIAGNOSIS — F32.0 MILD MAJOR DEPRESSION (H): Primary | ICD-10-CM

## 2018-03-27 DIAGNOSIS — G47.00 INSOMNIA, UNSPECIFIED TYPE: ICD-10-CM

## 2018-03-27 PROCEDURE — 99214 OFFICE O/P EST MOD 30 MIN: CPT | Performed by: FAMILY MEDICINE

## 2018-03-27 RX ORDER — TRIAMCINOLONE ACETONIDE 1 MG/G
CREAM TOPICAL
Qty: 15 G | Refills: 0 | Status: SHIPPED | OUTPATIENT
Start: 2018-03-27 | End: 2018-04-03

## 2018-03-27 RX ORDER — FUROSEMIDE 20 MG
40 TABLET ORAL DAILY
COMMUNITY
Start: 2018-03-16 | End: 2019-09-17

## 2018-03-27 RX ORDER — ESZOPICLONE 3 MG/1
3 TABLET, FILM COATED ORAL
Qty: 90 TABLET | Refills: 1 | Status: SHIPPED | OUTPATIENT
Start: 2018-03-27 | End: 2018-10-12

## 2018-03-27 ASSESSMENT — ANXIETY QUESTIONNAIRES
7. FEELING AFRAID AS IF SOMETHING AWFUL MIGHT HAPPEN: MORE THAN HALF THE DAYS
1. FEELING NERVOUS, ANXIOUS, OR ON EDGE: SEVERAL DAYS
2. NOT BEING ABLE TO STOP OR CONTROL WORRYING: NEARLY EVERY DAY
GAD7 TOTAL SCORE: 10
3. WORRYING TOO MUCH ABOUT DIFFERENT THINGS: MORE THAN HALF THE DAYS
5. BEING SO RESTLESS THAT IT IS HARD TO SIT STILL: NOT AT ALL
6. BECOMING EASILY ANNOYED OR IRRITABLE: SEVERAL DAYS
IF YOU CHECKED OFF ANY PROBLEMS ON THIS QUESTIONNAIRE, HOW DIFFICULT HAVE THESE PROBLEMS MADE IT FOR YOU TO DO YOUR WORK, TAKE CARE OF THINGS AT HOME, OR GET ALONG WITH OTHER PEOPLE: SOMEWHAT DIFFICULT

## 2018-03-27 ASSESSMENT — PATIENT HEALTH QUESTIONNAIRE - PHQ9: 5. POOR APPETITE OR OVEREATING: SEVERAL DAYS

## 2018-03-27 NOTE — LETTER
My Depression Action Plan  Name: Katherin Jonas   Date of Birth 1940  Date: 3/22/2018    My doctor: Silvia Matthews   My clinic: [address]  [facility] [id number] [phone]          GREEN    ZONE   Good Control    What it looks like:     Things are going generally well. You have normal up s and down s. You may even feel depressed from time to time, but bad moods usually last less than a day.   What you need to do:  1. Continue to care for yourself (see self care plan)  2. Check your depression survival kit and update it as needed  3. Follow your physician s recommendations including any medication.  4. Do not stop taking medication unless you consult with your physician first.           YELLOW         ZONE Getting Worse    What it looks like:     Depression is starting to interfere with your life.     It may be hard to get out of bed; you may be starting to isolate yourself from others.    Symptoms of depression are starting to last most all day and this has happened for several days.     You may have suicidal thoughts but they are not constant.   What you need to do:     1. Call your care team, your response to treatment will improve if you keep your care team informed of your progress. Yellow periods are signs an adjustment may need to be made.     2. Continue your self-care, even if you have to fake it!    3. Talk to someone in your support network    4. Open up your depression survival kit           RED    ZONE Medical Alert - Get Help    What it looks like:     Depression is seriously interfering with your life.     You may experience these or other symptoms: You can t get out of bed most days, can t work or engage in other necessary activities, you have trouble taking care of basic hygiene, or basic responsibilities, thoughts of suicide or death that will not go away, self-injurious behavior.     What you need to do:  1. Call your care  team and request a same-day appointment. If they are not available (weekends or after hours) call your local crisis line, emergency room or 911.            Depression Self Care Plan / Survival Kit    Self-Care for Depression  Here s the deal. Your body and mind are really not as separate as most people think.  What you do and think affects how you feel and how you feel influences what you do and think. This means if you do things that people who feel good do, it will help you feel better.  Sometimes this is all it takes.  There is also a place for medication and therapy depending on how severe your depression is, so be sure to consult with your medical provider and/ or Behavioral Health Consultant if your symptoms are worsening or not improving.     In order to better manage my stress, I will:    Exercise  Get some form of exercise, every day. This will help reduce pain and release endorphins, the  feel good  chemicals in your brain. This is almost as good as taking antidepressants!  This is not the same as joining a gym and then never going! (they count on that by the way ) It can be as simple as just going for a walk or doing some gardening, anything that will get you moving.      Hygiene   Maintain good hygiene (Get out of bed in the morning, Make your bed, Brush your teeth, Take a shower, and Get dressed like you were going to work, even if you are unemployed).  If your clothes don't fit try to get ones that do.    Diet  I will strive to eat foods that are good for me, drink plenty of water, and avoid excessive sugar, caffeine, alcohol, and other mood-altering substances.  Some foods that are helpful in depression are: complex carbohydrates, B vitamins, flaxseed, fish or fish oil, fresh fruits and vegetables.    Psychotherapy  I agree to participate in Individual Therapy (if recommended).    Medication  If prescribed medications, I agree to take them.  Missing doses can result in serious side effects.  I  understand that drinking alcohol, or other illicit drug use, may cause potential side effects.  I will not stop my medication abruptly without first discussing it with my provider.    Staying Connected With Others  I will stay in touch with my friends, family members, and my primary care provider/team.    Use your imagination  Be creative.  We all have a creative side; it doesn t matter if it s oil painting, sand castles, or mud pies! This will also kick up the endorphins.    Witness Beauty  (AKA stop and smell the roses) Take a look outside, even in mid-winter. Notice colors, textures. Watch the squirrels and birds.     Service to others  Be of service to others.  There is always someone else in need.  By helping others we can  get out of ourselves  and remember the really important things.  This also provides opportunities for practicing all the other parts of the program.    Humor  Laugh and be silly!  Adjust your TV habits for less news and crime-drama and more comedy.    Control your stress  Try breathing deep, massage therapy, biofeedback, and meditation. Find time to relax each day.     My support system    Clinic Contact:  Phone number:    Contact 1:  Phone number:    Contact 2:  Phone number:    [facility]/:  Phone number:    Therapist:  Phone number:    Local crisis center:    Phone number:    Other community support:  Phone number:

## 2018-03-27 NOTE — NURSING NOTE
"Chief Complaint   Patient presents with     Anxiety     Panel Management     DAP       Initial /66 (BP Location: Right arm, Patient Position: Chair, Cuff Size: Adult Regular)  Pulse 60  Temp 97.9  F (36.6  C) (Temporal)  Resp 16  Wt 129 lb (58.5 kg)  LMP 02/01/2011  SpO2 91%  BMI 26.32 kg/m2 Estimated body mass index is 26.32 kg/(m^2) as calculated from the following:    Height as of 9/19/17: 4' 10.7\" (1.491 m).    Weight as of this encounter: 129 lb (58.5 kg).  Medication Reconciliation: complete   Kirsten Jaffe CMA      "

## 2018-03-27 NOTE — MR AVS SNAPSHOT
After Visit Summary   3/27/2018    Katherin Jonas    MRN: [id number]           Patient Information     Date Of Birth          1940        Visit Information        Provider Department      3/27/2018 9:40 AM Silvia Matthews MD [facility]        Today's Diagnoses     Mild major depression (H)    -  1    Hypertension goal BP (blood pressure) < 140/90        Dermatitis        Insomnia, unspecified type           Follow-ups after your visit        Follow-up notes from your care team     Return in about 3 weeks (around 4/17/2018) for pain and depression.      Your next 10 appointments already scheduled     Mar 27, 2018  9:40 AM CDT   [facility] with Silvia Matthews MD   [facility]    [address] [id number] [phone]           Bring a current list of meds and any records pertaining to this visit. For Physicals, please bring immunization records and any forms needing to be filled out. Please arrive 10 minutes early to complete paperwork.            Apr 17, 2018  9:40 AM CDT   Office Visit with Silvia Matthews MD   [facility]    [address] [address] [phone]           Bring a current list of meds and any records pertaining to this visit. For Physicals, please bring immunization records and any forms needing to be filled out. Please arrive 10 minutes early to complete paperwork.            Apr 27, 2018  8:00 AM CDT   New Visit with Donald Lee MD   [facility] ([address] [address] [phone]              Who to contact     If you have questions or need follow up information about today's clinic visit or your schedule please contact [facility] directly at [phone].  Normal or non-critical lab and imaging results will  "be communicated to you by X-IOhart, letter or phone within 4 business days after the clinic has received the results. If you do not hear from us within 7 days, please contact the clinic through Ruckus or phone. If you have a critical or abnormal lab result, we will notify you by phone as soon as possible.  Submit refill requests through Ruckus or call your pharmacy and they will forward the refill request to us. Please allow 3 business days for your refill to be completed.          Additional Information About Your Visit        Ruckus Information     Ruckus lets you send messages to your doctor, view your test results, renew your prescriptions, schedule appointments and more. To sign up, go to [email or web address] . Click on \"Log in\" on the left side of the screen, which will take you to the Welcome page. Then click on \"Sign up Now\" on the right side of the page.     You will be asked to enter the access code listed below, as well as some personal information. Please follow the directions to create your username and password.     Your access code is: [email or web address]  Expires: 2018  2:54 PM     Your access code will  in 90 days. If you need help or a new code, please call your [facility] clinic or [phone].        Care EveryWhere ID     This is your Care EveryWhere ID. This could be used by other organizations to access your [facility] medical records  [id number]        Your Vitals Were     Pulse Temperature Respirations Last Period Pulse Oximetry BMI (Body Mass Index)    60 97.9  F (36.6  C) (Temporal) 16 2011 91% 26.32 kg/m2       Blood Pressure from Last 3 Encounters:   18 158/66   18 152/74   18 126/70    Weight from Last 3 Encounters:   18 129 lb (58.5 kg)   18 136 lb (61.7 kg)   17 135 lb 12.8 oz (61.6 kg)              Today, you had the following     No orders found for display         Today's Medication Changes          These changes are accurate as of " 3/27/18  9:02 AM.  If you have any questions, ask your nurse or doctor.               Start taking these medicines.        Dose/Directions    FLUoxetine 20 MG capsule   Commonly known as:  PROzac   Used for:  Mild major depression (H)   Started by:  Silvia Matthews MD        Dose:  20 mg   Take 1 capsule (20 mg) by mouth daily   Quantity:  30 capsule   Refills:  1       triamcinolone 0.1 % cream   Commonly known as:  KENALOG   Used for:  Dermatitis   Started by:  Silvia Matthews MD        Apply sparingly to affected area three times daily for 14 days.   Quantity:  15 g   Refills:  0         Stop taking these medicines if you haven't already. Please contact your care team if you have questions.     citalopram 20 MG tablet   Commonly known as:  celeXA   Stopped by:  Silvia Matthews MD                Where to get your medicines      These medications were sent to [facility] [address] [place] [address] [address] AT [facility] & [address] , [facility] [address]     Phone:  [phone]     FLUoxetine 20 MG capsule    triamcinolone 0.1 % cream         Some of these will need a paper prescription and others can be bought over the counter.  Ask your nurse if you have questions.     Bring a paper prescription for each of these medications     eszopiclone 3 MG tablet                Primary Care Provider Office Phone # Fax #    Silvia Matthews [phone]       [address] [place] [id number]        Equal Access to Services     [facility] HospitalRE[facility] AH: Hadii javier ku hadasho Soomaali, waaxda luqadaha, qaybta kaalmada adeegyada, martínez molina. So [facility] [phone].    ATENCIÓN: Si habla español, tiene a bernardo disposición servicios gratuitos de asistencia lingüística. Llame al [phone].    We comply with applicable federal civil rights laws and [place] laws. We do not discriminate on the basis of race, color,  national origin, age, disability, sex, sexual orientation, or gender identity.            Thank you!     Thank you for choosing [facility] [place]  for your care. Our goal is always to provide you with excellent care. Hearing back from our patients is one way we can continue to improve our services. Please take a few minutes to complete the written survey that you may receive in the mail after your visit with us. Thank you!             Your Updated Medication List - Protect others around you: Learn how to safely use, store and throw away your medicines at www.disposemymeds.org.          This list is accurate as of 3/27/18  9:02 AM.  Always use your most recent med list.                   Brand Name Dispense Instructions for use Diagnosis    ACE/ARB/ARNI NOT PRESCRIBED (INTENTIONAL)      continuous prn. ACE & ARB not prescribed due to Other:BP controlled, no microalbuminuria        ASPIRIN NOT PRESCRIBED    INTENTIONAL    0 each    Please choose reason not prescribed, below    Essential hypertension with goal blood pressure less than 140/90       atorvastatin 10 MG tablet    LIPITOR    90 tablet    Take 1 tablet (10 mg) by mouth daily    Hyperlipidemia, unspecified       CALCIUM CITRATE + D PO      1,200 mg daily        eszopiclone 3 MG tablet    LUNESTA    90 tablet    Take 1 tablet (3 mg) by mouth nightly as needed    Insomnia, unspecified type       FLUoxetine 20 MG capsule    PROzac    30 capsule    Take 1 capsule (20 mg) by mouth daily    Mild major depression (H)       furosemide 20 MG tablet    LASIX     Take 20 mg by mouth        gabapentin 300 MG capsule    NEURONTIN    360 capsule    TAKE ONE CAPSULE BY MOUTH FOUR TIMES A DAY    Postherpetic neuralgia       levalbuterol 45 MCG/ACT Inhaler    XOPENEX HFA    1 Inhaler    Inhale 2 puffs into the lungs every 6 hours as needed for shortness of breath / dyspnea or wheezing        lidocaine 5 % Patch    LIDODERM    30 patch    Apply up to 3 patches to  painful area at once for up to 12 h within a 24 h period.  Remove after 12 hours.    Other chronic pain, Postherpetic neuralgia       Lutein 6 MG Tabs      Take  by mouth daily.        metoprolol succinate 100 MG 24 hr tablet    TOPROL-XL    90 tablet    Take 1 tablet (100 mg) by mouth 2 times daily    Essential hypertension with goal blood pressure less than 140/90       morphine 30 MG 12 hr tablet    MS CONTIN    60 tablet    Take 1 tablet (30 mg) by mouth 2 times daily    Other chronic pain       MULTI-VITAMIN PO      once daily        naloxone auto-injector    EVZIO    0.8 mL    Inject 0.4 mLs (0.4 mg) into the muscle as needed for opioid reversal every 2-3 minutes until assistance arrives    Chronic, continuous use of opioids       * nystatin 512434 UNIT/GM Powd    NYSTOP    120 g    APPLY 1 DOSE TOPICALLY THREE TIMES DAILY AS NEEDED    Intertrigo       * nystatin cream    MYCOSTATIN    60 g    APPLY TOPICALLY DAILY AS NEEDED(RASH UNDER BREAST AND IN GROIN)    Intertrigo       omeprazole 20 MG tablet      Take 20 mg by mouth daily        oxyCODONE-acetaminophen 7.5-325 MG per tablet    PERCOCET    90 tablet    Take 1 tablet by mouth every 8 hours as needed for pain    Other chronic pain       rivaroxaban ANTICOAGULANT 20 MG Tabs tablet    XARELTO     Take 20 mg by mouth        SENNA-GEN 8.6 MG tablet   Generic drug:  senna     120    2 TABLETS AT Twice daily    Unspecified constipation       triamcinolone 0.1 % cream    KENALOG    15 g    Apply sparingly to affected area three times daily for 14 days.    Dermatitis       umeclidinium 62.5 MCG/INH oral inhaler    INCRUSE ELLIPTA     Inhale 1 puff into the lungs daily        * Notice:  This list has 2 medication(s) that are the same as other medications prescribed for you. Read the directions carefully, and ask your doctor or other care provider to review them with you.

## 2018-03-28 ASSESSMENT — ANXIETY QUESTIONNAIRES: GAD7 TOTAL SCORE: 10

## 2018-03-28 ASSESSMENT — PATIENT HEALTH QUESTIONNAIRE - PHQ9: SUM OF ALL RESPONSES TO PHQ QUESTIONS 1-9: 6

## 2018-03-30 ENCOUNTER — TELEPHONE (OUTPATIENT)
Dept: FAMILY MEDICINE | Facility: OTHER | Age: 78
End: 2018-03-30

## 2018-03-30 NOTE — TELEPHONE ENCOUNTER
Reason for Call:  Form, our goal is to have forms completed with 72 hours, however, some forms may require a visit or additional information.    Type of letter, form or note:  medical    Who is the form from?: [facility] (if other please explain)    Where did the form come from: form was faxed in    What clinic location was the form placed at?: [facility] - [phone]    Where the form was placed: 's Box    What number is listed as a contact on the form?: [phone]       Additional comments: please complete and fax to [phone]

## 2018-03-30 NOTE — TELEPHONE ENCOUNTER
Reason for Call:  Form, our goal is to have forms completed with 72 hours, however, some forms may require a visit or additional information.    Type of letter, form or note:  medical    Who is the form from?: [facility] (if other please explain)    Where did the form come from: form was faxed in    What clinic location was the form placed at?: [facility] - [phone]    Where the form was placed: 's Box    What number is listed as a contact on the form?: [phone]       Additional comments: please complete and fax to [phone]    Call taken on 3/30/2018 at 7:51 AM by Carly Heard

## 2018-04-03 DIAGNOSIS — L30.9 DERMATITIS: ICD-10-CM

## 2018-04-03 RX ORDER — TRIAMCINOLONE ACETONIDE 1 MG/G
CREAM TOPICAL
Qty: 15 G | Refills: 0 | Status: SHIPPED | OUTPATIENT
Start: 2018-04-03 | End: 2019-09-17

## 2018-04-10 NOTE — PROGRESS NOTES
SUBJECTIVE:   Katherin Jonas is a 77 year old female who presents to clinic today for the following health issues:      History of Present Illness     Depression & Anxiety Follow-up:     Depression/Anxiety:  Depression & Anxiety    Status since last visit::  Worsened    Other associated symptoms of depression and anxiety::  None    Significant life event::  No    Current substance use::  None       Today's PHQ-9         PHQ-9 Total Score:         PHQ-9 Q9 Suicidal ideation:       Thoughts of suicide or self harm:      Self-harm Plan:        Self-harm Action:          Safety concerns for self or others:              RACHID-7 SCORE 2/23/2018 3/27/2018 4/17/2018   Total Score - - -   Total Score 13 10 11       PHQ-9 SCORE 2/23/2018 3/27/2018 4/17/2018   Total Score - - -   Total Score MyChart - - -   Total Score 8 6 6     Looking for more help with mental health, interested in Therapy, would prefer a woman. States she is worried about going to sleep because she doesn't know if she will wake up in the morning.     Pulmonologist took her off of Incruse and put her on Flovent, this caused her to lose her voice. Pulmonologist recommended she go back on Breo. No change in a week yet. Saw an NP yesterday for voice, recommended she stop all inhalers for 10-14 days. Oxygen saturations have been pretty good.  says her coughing and spitting has probably increased. NP recommended Flonase as well, and possible referral to ENT.     Problem list and histories reviewed & adjusted, as indicated.  Additional history: as documented    Patient Active Problem List   Diagnosis     Esophageal reflux     Carotid atherosclerosis     CKD (chronic kidney disease) stage 3, GFR 30-59 ml/min     Insomnia     Mild major depression (H)     Advanced directives, counseling/discussion     Essential hypertension     Hyperlipidemia, unspecified     Scoliosis     Osteopenia     Postherpetic neuralgia     Other chronic pain     COPD (chronic  obstructive pulmonary disease) (H)     Paroxysmal atrial fibrillation (H)     Anxiety     Past Surgical History:   Procedure Laterality Date     C FULL ROUT OBSTE CARE, DELIV  1964     C FULL ROUT OBSTE CARE, DELIV       C FULL ROUT OBSTE CARE, DELIV  1970     C TOTAL KNEE ARTHROPLASTY  1997    left     C TOTAL KNEE ARTHROPLASTY  2005    right     C TREAT ECTOPIC PREG,RMV TUBE/OVARY  1967    left     COLONOSCOPY  11    [facility] REPAIR OF NASAL SEPTUM         Social History   Substance Use Topics     Smoking status: Former Smoker     Quit date: 1979     Smokeless tobacco: Never Used      Comment: no smokers in household     Alcohol use No      Comment: none since      Family History   Problem Relation Age of Onset     CANCER Daughter      Hypertension Mother          Current Outpatient Prescriptions   Medication Sig Dispense Refill     [START ON 2018] oxyCODONE-acetaminophen (PERCOCET) 7.5-325 MG per tablet Take 1 tablet by mouth every 8 hours as needed for pain 90 tablet 0     [START ON 2018] morphine (MS CONTIN) 30 MG 12 hr tablet Take 1 tablet (30 mg) by mouth 2 times daily 60 tablet 0     FLUoxetine (PROZAC) 40 MG capsule Take 1 capsule (40 mg) by mouth daily 30 capsule 1     amLODIPine (NORVASC) 5 MG tablet Take 1 tablet (5 mg) by mouth daily 30 tablet 1     triamcinolone (KENALOG) 0.1 % cream APPLY SPARINGLY EXTERNALLY TO THE AFFECTED AREA THREE TIMES DAILY FOR 14 DAYS 15 g 0     furosemide (LASIX) 20 MG tablet Take 20 mg by mouth       rivaroxaban ANTICOAGULANT (XARELTO) 20 MG TABS tablet Take 20 mg by mouth       eszopiclone (LUNESTA) 3 MG tablet Take 1 tablet (3 mg) by mouth nightly as needed 90 tablet 1     metoprolol succinate (TOPROL-XL) 100 MG 24 hr tablet Take 1 tablet (100 mg) by mouth 2 times daily 90 tablet 3     omeprazole 20 MG tablet Take 20 mg by mouth daily       levalbuterol (XOPENEX HFA) 45 MCG/ACT  Inhaler Inhale 2 puffs into the lungs every 6 hours as needed for shortness of breath / dyspnea or wheezing 1 Inhaler 1     ASPIRIN NOT PRESCRIBED (INTENTIONAL) Please choose reason not prescribed, below 0 each 0     naloxone (EVZIO) auto-injector Inject 0.4 mLs (0.4 mg) into the muscle as needed for opioid reversal every 2-3 minutes until assistance arrives 0.8 mL 0     gabapentin (NEURONTIN) 300 MG capsule TAKE ONE CAPSULE BY MOUTH FOUR TIMES A  capsule 3     atorvastatin (LIPITOR) 10 MG tablet Take 1 tablet (10 mg) by mouth daily 90 tablet 3     nystatin (NYSTOP) 861821 UNIT/GM POWD APPLY 1 DOSE TOPICALLY THREE TIMES DAILY AS NEEDED 120 g 11     nystatin (MYCOSTATIN) cream APPLY TOPICALLY DAILY AS NEEDED(RASH UNDER BREAST AND IN GROIN) 60 g 11     lidocaine (LIDODERM) 5 % Patch Apply up to 3 patches to painful area at once for up to 12 h within a 24 h period.  Remove after 12 hours. 30 patch 11     ACE/ARB NOT PRESCRIBED, INTENTIONAL, continuous prn. ACE & ARB not prescribed due to Other:BP controlled, no microalbuminuria       Lutein 6 MG TABS Take  by mouth daily.       MULTI-VITAMIN OR once daily        CALCIUM CITRATE + D OR 1,200 mg daily        SENNA-GEN 8.6 MG OR TABS 2 TABLETS AT Twice daily 120 prn     [DISCONTINUED] FLUoxetine (PROZAC) 20 MG capsule Take 1 capsule (20 mg) by mouth daily 30 capsule 1     BP Readings from Last 3 Encounters:   04/17/18 (!) 176/94   03/27/18 158/66   03/06/18 152/74    Wt Readings from Last 3 Encounters:   04/17/18 118 lb (53.5 kg)   03/27/18 129 lb (58.5 kg)   01/19/18 136 lb (61.7 kg)           ROS:  CONSTITUTIONAL: Unintentional weight loss NEGATIVE for fever, chills  ENT/MOUTH: see HPI   RESP: see HPI   CV: Chest heaviness off and on for past 2 days. Not worse with exertion or stress/anxiety. Feet are swollen.  NEGATIVE for palpitations  GI: NEGATIVE for nausea, abdominal pain, heartburn, or change in bowel habits  NEURO: NEGATIVE for weakness, dizziness or  paresthesias  PSYCHIATRIC: see HPI     OBJECTIVE:     BP (!) 176/94  Pulse 70  Temp 98.3  F (36.8  C) (Temporal)  Resp 16  Wt 118 lb (53.5 kg)  LMP 02/01/2011  SpO2 96%  BMI 24.08 kg/m2  Body mass index is 24.08 kg/(m^2).  GENERAL: healthy, alert and no distress  NECK: no adenopathy, no asymmetry, masses, or scars and thyroid normal to palpation  RESP: lungs clear to auscultation - no rales, rhonchi or wheezes  CV: regular rate and rhythm, normal S1 S2, no murmur, 2+ pitting edema to above ankle in left leg, trace edema in right foot   ABDOMEN: soft, nontender, no masses and bowel sounds normal  MS: no gross musculoskeletal defects noted    Diagnostic Test Results:  none     ASSESSMENT/PLAN:     1. Anxiety  Patient endorsing worsening anxiety, mostly about her health. RACHID-7 increased to 11 (from 10 last month), indicating moderate anxiety. Will refer for outpatient therapy and also increase fluoxetine dose. Follow up in 1 month.    - MENTAL HEALTH REFERRAL  - Adult; Outpatient Treatment; Individual/Couples/Family/Group Therapy/Health Psychology; [facility]: [facility] [phone]; We will contact you to schedule the appointment or please call with any questions    2. Mild major depression (H)  Patient endorsing worsening depression as well. PHQ-9 stable at 6, indicating mild depression. Patient will likely benefit from outpatient therapy and will increase fluoxetine dose.    - MENTAL HEALTH REFERRAL  - Adult; Outpatient Treatment; Individual/Couples/Family/Group Therapy/Health Psychology; [facility]: [facility] [phone]; We will contact you to schedule the appointment or please call with any questions  - FLUoxetine (PROZAC) 40 MG capsule; Take 1 capsule (40 mg) by mouth daily  Dispense: 30 capsule; Refill: 1    3. Other chronic pain  Patient on chronic pain contract with no concerns. We hope to decrease her dosing of opioids, however with her current anxiety about her health and  medications, we will not make any changes today.     - oxyCODONE-acetaminophen (PERCOCET) 7.5-325 MG per tablet; Take 1 tablet by mouth every 8 hours as needed for pain  Dispense: 90 tablet; Refill: 0  - morphine (MS CONTIN) 30 MG 12 hr tablet; Take 1 tablet (30 mg) by mouth 2 times daily  Dispense: 60 tablet; Refill: 0  - Pain Drug Scr UR W Rptd Meds    4. CKD (chronic kidney disease) stage 3, GFR 30-59 ml/min  Patient has bilateral LE edema, it has greatly improved over the last month with Lasix. Otherwise, no new concerns possibly regarding CKD. Will restart patient on amlodipine (had been stopped after her hospitalization for sepsis earlier this winter) to better control her blood pressure. Will obtain urine albumin-creatinine today. Follow up in 1 month.    - Albumin Random Urine Quantitative with Creat Ratio    5. Essential hypertension  Patient with history of HTN managed with beta-blocker and amlodipine. After hospitalization, BP was running lower, so amlodipine was stopped. BP has been elevated for past 3 weeks, including today in clinic. Will restart amlodipine.   - amLODIPine (NORVASC) 5 MG tablet; Take 1 tablet (5 mg) by mouth daily  Dispense: 30 tablet; Refill: 1    This document serves as a record of the services and decisions personally performed and made by Slivia Matthews MD.  It was created on his/her behalf by Ana María Ventura, a trained medical student and scribe.  The creation of this record is based on the provider's personal observations and the statements of the patient.     Ana María Ventura, MS3  April 17, 2018    This patient was seen and examined by myself as well as the medical student.  The medical student has scribed the note and I have reviewed it, edited it appropriately and agree with the final documentation. Electronically signed by Silvia Matthews MD on 4/18/2018    [facility]

## 2018-04-17 ENCOUNTER — OFFICE VISIT (OUTPATIENT)
Dept: FAMILY MEDICINE | Facility: OTHER | Age: 78
End: 2018-04-17
Payer: COMMERCIAL

## 2018-04-17 VITALS
TEMPERATURE: 98.3 F | OXYGEN SATURATION: 96 % | RESPIRATION RATE: 16 BRPM | WEIGHT: 118 LBS | DIASTOLIC BLOOD PRESSURE: 94 MMHG | SYSTOLIC BLOOD PRESSURE: 176 MMHG | HEART RATE: 70 BPM | BODY MASS INDEX: 24.08 KG/M2

## 2018-04-17 DIAGNOSIS — F32.0 MILD MAJOR DEPRESSION (H): ICD-10-CM

## 2018-04-17 DIAGNOSIS — G89.29 OTHER CHRONIC PAIN: ICD-10-CM

## 2018-04-17 DIAGNOSIS — N18.30 CKD (CHRONIC KIDNEY DISEASE) STAGE 3, GFR 30-59 ML/MIN (H): ICD-10-CM

## 2018-04-17 DIAGNOSIS — I10 ESSENTIAL HYPERTENSION: ICD-10-CM

## 2018-04-17 DIAGNOSIS — F41.9 ANXIETY: Primary | ICD-10-CM

## 2018-04-17 LAB
CREAT UR-MCNC: 7 MG/DL
MICROALBUMIN UR-MCNC: <5 MG/L
MICROALBUMIN/CREAT UR: NORMAL MG/G CR (ref 0–25)

## 2018-04-17 PROCEDURE — 82043 UR ALBUMIN QUANTITATIVE: CPT | Performed by: FAMILY MEDICINE

## 2018-04-17 PROCEDURE — 99214 OFFICE O/P EST MOD 30 MIN: CPT | Performed by: FAMILY MEDICINE

## 2018-04-17 PROCEDURE — 80307 DRUG TEST PRSMV CHEM ANLYZR: CPT | Mod: 90 | Performed by: FAMILY MEDICINE

## 2018-04-17 PROCEDURE — 99000 SPECIMEN HANDLING OFFICE-LAB: CPT | Performed by: FAMILY MEDICINE

## 2018-04-17 RX ORDER — OXYCODONE AND ACETAMINOPHEN 7.5; 325 MG/1; MG/1
1 TABLET ORAL EVERY 8 HOURS PRN
Qty: 90 TABLET | Refills: 0 | Status: SHIPPED | OUTPATIENT
Start: 2018-04-17 | End: 2018-04-17

## 2018-04-17 RX ORDER — AMLODIPINE BESYLATE 5 MG/1
5 TABLET ORAL DAILY
Qty: 30 TABLET | Refills: 1 | Status: SHIPPED | OUTPATIENT
Start: 2018-04-17 | End: 2018-05-15

## 2018-04-17 RX ORDER — FLUOXETINE 40 MG/1
40 CAPSULE ORAL DAILY
Qty: 30 CAPSULE | Refills: 1 | Status: SHIPPED | OUTPATIENT
Start: 2018-04-17 | End: 2018-05-15

## 2018-04-17 RX ORDER — MORPHINE SULFATE 30 MG/1
30 TABLET, FILM COATED, EXTENDED RELEASE ORAL 2 TIMES DAILY
Qty: 60 TABLET | Refills: 0 | Status: SHIPPED | OUTPATIENT
Start: 2018-05-17 | End: 2018-04-17

## 2018-04-17 RX ORDER — MORPHINE SULFATE 30 MG/1
30 TABLET, FILM COATED, EXTENDED RELEASE ORAL 2 TIMES DAILY
Qty: 60 TABLET | Refills: 0 | Status: SHIPPED | OUTPATIENT
Start: 2018-04-17 | End: 2018-04-17

## 2018-04-17 RX ORDER — OXYCODONE AND ACETAMINOPHEN 7.5; 325 MG/1; MG/1
1 TABLET ORAL EVERY 8 HOURS PRN
Qty: 90 TABLET | Refills: 0 | Status: SHIPPED | OUTPATIENT
Start: 2018-05-17 | End: 2018-04-17

## 2018-04-17 RX ORDER — MORPHINE SULFATE 30 MG/1
30 TABLET, FILM COATED, EXTENDED RELEASE ORAL 2 TIMES DAILY
Qty: 60 TABLET | Refills: 0 | Status: SHIPPED | OUTPATIENT
Start: 2018-06-17 | End: 2018-07-03

## 2018-04-17 RX ORDER — OXYCODONE AND ACETAMINOPHEN 7.5; 325 MG/1; MG/1
1 TABLET ORAL EVERY 8 HOURS PRN
Qty: 90 TABLET | Refills: 0 | Status: SHIPPED | OUTPATIENT
Start: 2018-06-17 | End: 2018-05-15

## 2018-04-17 ASSESSMENT — ANXIETY QUESTIONNAIRES
1. FEELING NERVOUS, ANXIOUS, OR ON EDGE: MORE THAN HALF THE DAYS
GAD7 TOTAL SCORE: 11
6. BECOMING EASILY ANNOYED OR IRRITABLE: SEVERAL DAYS
5. BEING SO RESTLESS THAT IT IS HARD TO SIT STILL: NOT AT ALL
7. FEELING AFRAID AS IF SOMETHING AWFUL MIGHT HAPPEN: MORE THAN HALF THE DAYS
2. NOT BEING ABLE TO STOP OR CONTROL WORRYING: MORE THAN HALF THE DAYS
3. WORRYING TOO MUCH ABOUT DIFFERENT THINGS: MORE THAN HALF THE DAYS
IF YOU CHECKED OFF ANY PROBLEMS ON THIS QUESTIONNAIRE, HOW DIFFICULT HAVE THESE PROBLEMS MADE IT FOR YOU TO DO YOUR WORK, TAKE CARE OF THINGS AT HOME, OR GET ALONG WITH OTHER PEOPLE: SOMEWHAT DIFFICULT

## 2018-04-17 ASSESSMENT — PATIENT HEALTH QUESTIONNAIRE - PHQ9: 5. POOR APPETITE OR OVEREATING: MORE THAN HALF THE DAYS

## 2018-04-17 NOTE — MR AVS SNAPSHOT
After Visit Summary   4/17/2018    Katherin Jonas    MRN: [id number]           Patient Information     Date Of Birth          1940        Visit Information        Provider Department      4/17/2018 9:40 AM Silvia Matthews MD [facility]        Today's Diagnoses     Anxiety    -  1    Mild major depression (H)        Other chronic pain        CKD (chronic kidney disease) stage 3, GFR 30-59 ml/min        Essential hypertension           Follow-ups after your visit        Additional Services     MENTAL HEALTH REFERRAL  - Adult; Outpatient Treatment; Individual/Couples/Family/Group Therapy/Health Psychology; [facility]: [facility] [phone]; We will contact you to schedule the appointment or please call with any questions       All scheduling is subject to the client's specific insurance plan & benefits, provider/location availability, and provider clinical specialities.  Please arrive 15 minutes early for your first appointment and bring your completed paperwork.    Please be aware that coverage of these services is subject to the terms and limitations of your health insurance plan.  Call member services at your health plan with any benefit or coverage questions.                            Follow-up notes from your care team     Return in about 4 weeks (around 5/15/2018) for BP Recheck and depression.      Your next 10 appointments already scheduled     Apr 27, 2018  8:00 AM CDT   New Visit with Donald Lee MD   [facility] ([address] [id number] [phone]            May 15, 2018  9:40 AM CDT   Office Visit with Silvia Matthews MD   [facility] ([facility])    [address] [id number] [phone]           Bring a current list of meds and any records pertaining to this visit. For Physicals, please bring immunization  "records and any forms needing to be filled out. Please arrive 10 minutes early to complete paperwork.              Who to contact     If you have questions or need follow up information about today's clinic visit or your schedule please contact [facility] directly at [phone].  Normal or non-critical lab and imaging results will be communicated to you by MyChart, letter or phone within 4 business days after the clinic has received the results. If you do not hear from us within 7 days, please contact the clinic through MyChart or phone. If you have a critical or abnormal lab result, we will notify you by phone as soon as possible.  Submit refill requests through [employer] or call your pharmacy and they will forward the refill request to us. Please allow 3 business days for your refill to be completed.          Additional Information About Your Visit        MyChart Information     [employer] lets you send messages to your doctor, view your test results, renew your prescriptions, schedule appointments and more. To sign up, go to [email or web address] . Click on \"Log in\" on the left side of the screen, which will take you to the Welcome page. Then click on \"Sign up Now\" on the right side of the page.     You will be asked to enter the access code listed below, as well as some personal information. Please follow the directions to create your username and password.     Your access code is: [email or web address]  Expires: 2018  2:54 PM     Your access code will  in 90 days. If you need help or a new code, please call your [facility] or [phone].        Care EveryWhere ID     This is your Care EveryWhere ID. This could be used by other organizations to access your [facility] medical records  [id number]        Your Vitals Were     Pulse Temperature Respirations Last Period Pulse Oximetry BMI (Body Mass Index)    70 98.3  F (36.8  C) (Temporal) 16 2011 96% 24.08 kg/m2       Blood Pressure " from Last 3 Encounters:   04/17/18 (!) 176/94   03/27/18 158/66   03/06/18 152/74    Weight from Last 3 Encounters:   04/17/18 118 lb (53.5 kg)   03/27/18 129 lb (58.5 kg)   01/19/18 136 lb (61.7 kg)              We Performed the Following     Albumin Random Urine Quantitative with Creat Ratio     MENTAL HEALTH REFERRAL  - Adult; Outpatient Treatment; Individual/Couples/Family/Group Therapy/Health Psychology; [facility]: [facility] [phone]; We will contact you to schedule the appointment or please call with any questions     Pain Drug Scr UR W Rptd Meds          Today's Medication Changes          These changes are accurate as of 4/17/18 10:56 AM.  If you have any questions, ask your nurse or doctor.               Start taking these medicines.        Dose/Directions    amLODIPine 5 MG tablet   Commonly known as:  NORVASC   Used for:  Essential hypertension   Started by:  Silvia Matthews MD        Dose:  5 mg   Take 1 tablet (5 mg) by mouth daily   Quantity:  30 tablet   Refills:  1       morphine 30 MG 12 hr tablet   Commonly known as:  MS CONTIN   Used for:  Other chronic pain   Started by:  Silvia Matthews MD        Dose:  30 mg   Start taking on:  6/17/2018   Take 1 tablet (30 mg) by mouth 2 times daily   Quantity:  60 tablet   Refills:  0       oxyCODONE-acetaminophen 7.5-325 MG per tablet   Commonly known as:  PERCOCET   Used for:  Other chronic pain   Started by:  Silvia Matthews MD        Dose:  1 tablet   Start taking on:  6/17/2018   Take 1 tablet by mouth every 8 hours as needed for pain   Quantity:  90 tablet   Refills:  0         These medicines have changed or have updated prescriptions.        Dose/Directions    FLUoxetine 40 MG capsule   Commonly known as:  PROzac   This may have changed:    - medication strength  - how much to take   Used for:  Mild major depression (H)   Changed by:  Silvia Matthews MD        Dose:  40 mg   Take 1 capsule (40 mg) by mouth  daily   Quantity:  30 capsule   Refills:  1            Where to get your medicines      These medications were sent to [facility] - [place] [address] AT [facility] & [place] [address], [facility] [address]     Phone:  [phone]     amLODIPine 5 MG tablet    FLUoxetine 40 MG capsule         Some of these will need a paper prescription and others can be bought over the counter.  Ask your nurse if you have questions.     Bring a paper prescription for each of these medications     morphine 30 MG 12 hr tablet    oxyCODONE-acetaminophen 7.5-325 MG per tablet                Primary Care Provider Office Phone # Fax #    Silvia Matthews [phone]       [address] [place] [address] 100  [facility] [address]        Equal Access to Services     SURINDER HUERTA : Hadii aad ku hadasho Soomaali, waaxda luqadaha, qaybta kaalmada adeegyada, martínez ramirez . So [facility] [phone].    ATENCIÓN: Si habla español, tiene a bernardo disposición servicios gratuitos de asistencia lingüística. [facility] [phone].    We comply with applicable federal civil rights laws and [place] laws. We do not discriminate on the basis of race, color, national origin, age, disability, sex, sexual orientation, or gender identity.            Thank you!     Thank you for choosing [facility]  for your care. Our goal is always to provide you with excellent care. Hearing back from our patients is one way we can continue to improve our services. Please take a few minutes to complete the written survey that you may receive in the mail after your visit with us. Thank you!             Your Updated Medication List - Protect others around you: Learn how to safely use, store and throw away your medicines at www.disposemymeds.org.          This list is accurate as of 4/17/18 10:56 AM.  Always use your most recent med list.                   Brand Name Dispense  Instructions for use Diagnosis    ACE/ARB/ARNI NOT PRESCRIBED (INTENTIONAL)      continuous prn. ACE & ARB not prescribed due to Other:BP controlled, no microalbuminuria        amLODIPine 5 MG tablet    NORVASC    30 tablet    Take 1 tablet (5 mg) by mouth daily    Essential hypertension       ASPIRIN NOT PRESCRIBED    INTENTIONAL    0 each    Please choose reason not prescribed, below    Essential hypertension with goal blood pressure less than 140/90       atorvastatin 10 MG tablet    LIPITOR    90 tablet    Take 1 tablet (10 mg) by mouth daily    Hyperlipidemia, unspecified       CALCIUM CITRATE + D PO      1,200 mg daily        eszopiclone 3 MG tablet    LUNESTA    90 tablet    Take 1 tablet (3 mg) by mouth nightly as needed    Insomnia, unspecified type       FLUoxetine 40 MG capsule    PROzac    30 capsule    Take 1 capsule (40 mg) by mouth daily    Mild major depression (H)       furosemide 20 MG tablet    LASIX     Take 20 mg by mouth        gabapentin 300 MG capsule    NEURONTIN    360 capsule    TAKE ONE CAPSULE BY MOUTH FOUR TIMES A DAY    Postherpetic neuralgia       levalbuterol 45 MCG/ACT Inhaler    XOPENEX HFA    1 Inhaler    Inhale 2 puffs into the lungs every 6 hours as needed for shortness of breath / dyspnea or wheezing        lidocaine 5 % Patch    LIDODERM    30 patch    Apply up to 3 patches to painful area at once for up to 12 h within a 24 h period.  Remove after 12 hours.    Other chronic pain, Postherpetic neuralgia       Lutein 6 MG Tabs      Take  by mouth daily.        metoprolol succinate 100 MG 24 hr tablet    TOPROL-XL    90 tablet    Take 1 tablet (100 mg) by mouth 2 times daily    Essential hypertension with goal blood pressure less than 140/90       morphine 30 MG 12 hr tablet   Start taking on:  6/17/2018    MS CONTIN    60 tablet    Take 1 tablet (30 mg) by mouth 2 times daily    Other chronic pain       MULTI-VITAMIN PO      once daily        naloxone auto-injector    BHUMI     0.8 mL    Inject 0.4 mLs (0.4 mg) into the muscle as needed for opioid reversal every 2-3 minutes until assistance arrives    Chronic, continuous use of opioids       * nystatin 813707 UNIT/GM Powd    NYSTOP    120 g    APPLY 1 DOSE TOPICALLY THREE TIMES DAILY AS NEEDED    Intertrigo       * nystatin cream    MYCOSTATIN    60 g    APPLY TOPICALLY DAILY AS NEEDED(RASH UNDER BREAST AND IN GROIN)    Intertrigo       omeprazole 20 MG tablet      Take 20 mg by mouth daily        oxyCODONE-acetaminophen 7.5-325 MG per tablet   Start taking on:  6/17/2018    PERCOCET    90 tablet    Take 1 tablet by mouth every 8 hours as needed for pain    Other chronic pain       rivaroxaban ANTICOAGULANT 20 MG Tabs tablet    XARELTO     Take 20 mg by mouth        SENNA-GEN 8.6 MG tablet   Generic drug:  senna     120    2 TABLETS AT Twice daily    Unspecified constipation       triamcinolone 0.1 % cream    KENALOG    15 g    APPLY SPARINGLY EXTERNALLY TO THE AFFECTED AREA THREE TIMES DAILY FOR 14 DAYS    Dermatitis       * Notice:  This list has 2 medication(s) that are the same as other medications prescribed for you. Read the directions carefully, and ask your doctor or other care provider to review them with you.

## 2018-04-17 NOTE — NURSING NOTE
"Chief Complaint   Patient presents with     Anxiety     Panel Management       Initial BP (!) 148/92 (BP Location: Right arm, Patient Position: Chair, Cuff Size: Adult Regular)  Pulse 70  Temp 98.3  F (36.8  C) (Temporal)  Resp 16  Wt 118 lb (53.5 kg)  LMP 02/01/2011  SpO2 96%  BMI 24.08 kg/m2 Estimated body mass index is 24.08 kg/(m^2) as calculated from the following:    Height as of 9/19/17: 4' 10.7\" (1.491 m).    Weight as of this encounter: 118 lb (53.5 kg).  Medication Reconciliation: complete   Kirsten Jaffe CMA      "

## 2018-04-18 ASSESSMENT — ANXIETY QUESTIONNAIRES: GAD7 TOTAL SCORE: 11

## 2018-04-18 ASSESSMENT — PATIENT HEALTH QUESTIONNAIRE - PHQ9: SUM OF ALL RESPONSES TO PHQ QUESTIONS 1-9: 6

## 2018-04-20 LAB — PAIN DRUG SCR UR W RPTD MEDS: NORMAL

## 2018-04-27 ENCOUNTER — OFFICE VISIT (OUTPATIENT)
Dept: PULMONOLOGY | Facility: CLINIC | Age: 78
End: 2018-04-27
Payer: COMMERCIAL

## 2018-04-27 VITALS
RESPIRATION RATE: 16 BRPM | OXYGEN SATURATION: 97 % | DIASTOLIC BLOOD PRESSURE: 48 MMHG | SYSTOLIC BLOOD PRESSURE: 137 MMHG | HEART RATE: 102 BPM | WEIGHT: 118 LBS | TEMPERATURE: 98.4 F | BODY MASS INDEX: 24.08 KG/M2

## 2018-04-27 DIAGNOSIS — R49.1 APHONIA: Primary | ICD-10-CM

## 2018-04-27 PROCEDURE — 99204 OFFICE O/P NEW MOD 45 MIN: CPT | Performed by: INTERNAL MEDICINE

## 2018-04-27 ASSESSMENT — PAIN SCALES - GENERAL: PAINLEVEL: NO PAIN (0)
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
## 2018-04-27 NOTE — NURSING NOTE
"Chief Complaint   Patient presents with     Consult     COPD       Initial /48 (BP Location: Right arm, Patient Position: Chair, Cuff Size: Adult Regular)  Pulse 102  Temp 98.4  F (36.9  C) (Temporal)  Resp 16  Wt 118 lb (53.5 kg)  LMP 02/01/2011  SpO2 97%  BMI 24.08 kg/m2 Estimated body mass index is 24.08 kg/(m^2) as calculated from the following:    Height as of 9/19/17: 4' 10.7\" (1.491 m).    Weight as of this encounter: 118 lb (53.5 kg).  Medication Reconciliation: complete   SEPIDEH Negrete  "

## 2018-04-27 NOTE — PROGRESS NOTES
"Visit Date:   04/27/2018      PULMONARY CONSULTATION      PHYSICIAN REQUESTING CONSULTATION:  Dr. Matthews.      REASON FOR CONSULTATION:  Hoarseness and asthma diagnosis.      Ms. Jonas is a 77-year-old female here accompanied by her  who also gives much information about her condition.      She first noted shortness of breath with exertion 5-7 years ago.  This was mild and she did not see a pulmonologist until several years ago and believes she was started on inhalers like a Breo Ellipta about 2 years ago.  She was a smoker early in her life from age  20 to 40, but was an occasional smoker and has not had any cigarettes since 1979.  No other exposure to toxic inhalants.  She was followed by the [place] [facility] and I did review her last visit from 06/22/2017 where the office visit showed that she has stable and mild symptoms and a diagnosis of both asthma and COPD and mild bronchiectasis is described.  Recommendations were to continue Breo 100 mcg and p.r.n. albuterol.  At that time, pulmonary function tests showed FVC 2.06, 88% of predicted, FEV1 1.52, 73% of predicted with a ratio of 61%.  This is mild airflow obstruction.      However, this winter, she has had multiple problems.  She was in [place], wintering, and she developed influenza A infection and was hospitalized.  She had mild \"cardiac abnormalities\" but echocardiogram was normal.  She traveled back to the [facility] and was admitted to [facility] in early February with dehydration and hypokalemia.  She improved; however, was readmitted about 2 weeks later with fatigue and shortness of breath and was seen in clinic and found to be in atrial fibrillation with rapid ventricular response and was admitted to the hospital.  Echocardiogram showed normal ejection fraction, she was placed on beta blockers and continued on anticoagulants, which had been started on the previous hospitalization and recommendations for cardiology " followup.  She was also discharged on oxygen therapy.  In the interim, she also saw a pulmonologist in the [facility] system and since Breo Ellipta was not available, she was placed on Flovent; however, she noticed severe hoarseness and discontinued that medication after 1 week.  She was also tried on Xopenex which did not seem to make much of an improvement and she stopped that.      Therefore, she has been off all inhaler medications for approximately 1 month.  She checks her oxygen level at home with a home oximeter and without oxygen for the last month, her saturations are always above 94%.  However, her voice hoarseness without neck pain or dysphagia or sore throat, has not improved.  She has an appointment to see an ENT physician in the [facility] system on 05/03.      Overall, she feels relatively well.  She is able to do light activities at home.  She cannot walk around very well outside, but some of this is ascribed to her scoliosis for which she is seen yearly by the orthopedic surgeon who has not recommended any surgical intervention.  She also had significant lower extremity edema in the last several months.  This has almost completely resolved with diuretic therapy.  She does not wake up at night short of breath, she has no cough.  No neck pain.  Not short of breath with mild activity.  She has not taken any rescue inhaler medication in several weeks.  She notes she may have mild allergies to cats and mild spring allergies related to pollens.      ASSESSMENT :  Her main complaint is of voice hoarseness which may be related to inhaled corticosteroid therapy, both by either Breo Ellipta or Flovent.  However, normally this improves with cessation of the medication which she has been off for almost 6 weeks.  ENT exam should be able to diagnosis this condition and there is an appointment in approximately 2 weeks.  In the meantime, she seems stable enough to not need any inhalers which she is reluctant to take  if there is any possibility this is making her voice hoarseness worse.  With an FEV1 in the mid-70s, she may need only p.r.n. albuterol.  She will follow up with Cardiology and probably will have at least 1 trial of a DC cardioversion.  Other than that, she can stay on her anticoagulants and beta blockers and amlodipine.  She does not seem to need supplemental oxygen based on home oximeter and therefore she can send the oxygen back.  She can follow up with me if she feels she needs an inhaler.         KIKA CAMPBELL MD, MPH             D: 2018   T: 2018   MT: PARVIN      Name:     DON CHAVEZ   MRN:      [id number]        Account:      [id number]   :      1940           Visit Date:   2018      Document: [id number]       cc: Silvia Matthews MD

## 2018-04-27 NOTE — MR AVS SNAPSHOT
After Visit Summary   4/27/2018    Katherin Jonas    MRN: [id number]           Patient Information     Date Of Birth          1940        Visit Information        Provider Department      4/27/2018 8:00 AM Donald Lee MD [facility]        Today's Diagnoses     [place]    -  1       Follow-ups after your visit        Your next 10 appointments already scheduled     May 15, 2018  9:40 AM CDT   Office Visit with Silvia Matthews MD   [facility]    [address] [place] [address] [address]  [facility] [address]   [phone]           Bring a current list of meds and any records pertaining to this visit. For Physicals, please bring immunization records and any forms needing to be filled out. Please arrive 10 minutes early to complete paperwork.            May 22, 2018 11:30 AM CDT   (Arrive by 11:00 AM)   New Visit with Tori Gil, PhD   [facility] ([facility])    [address] [place] [id number] [phone]            May 29, 2018  4:00 PM CDT   Return Visit with Tori Gil, PhD   [facility] ([facility])    [address] [place] [id number] [phone]              Who to contact     If you have questions or need follow up information about today's clinic visit or your schedule please contact [facility] directly at [phone].  Normal or non-critical lab and imaging results will be communicated to you by MyChart, letter or phone within 4 business days after the clinic has received the results. If you do not hear from us within 7 days, please contact the clinic through MyChart or phone. If you have a critical or abnormal lab result, we will notify you by phone as soon as possible.  Submit refill requests through [employer] or call your pharmacy and they will forward the  "refill request to us. Please allow 3 business days for your refill to be completed.          Additional Information About Your Visit        [employer] Information     [employer] lets you send messages to your doctor, view your test results, renew your prescriptions, schedule appointments and more. To sign up, go to [email or web address] . Click on \"Log in\" on the left side of the screen, which will take you to the Welcome page. Then click on \"Sign up Now\" on the right side of the page.     You will be asked to enter the access code listed below, as well as some personal information. Please follow the directions to create your username and password.     Your access code is: [email or web address]  Expires: 2018  9:06 AM     Your access code will  in 90 days. If you need help or a new code, please call your [facility] clinic or [phone].        Care EveryWhere ID     This is your Care EveryWhere ID. This could be used by other organizations to access your [facility] medical records  [id number]        Your Vitals Were     Pulse Temperature Respirations Last Period Pulse Oximetry BMI (Body Mass Index)    102 98.4  F (36.9  C) (Temporal) 16 2011 97% 24.08 kg/m2       Blood Pressure from Last 3 Encounters:   18 137/48   18 (!) 176/94   18 158/66    Weight from Last 3 Encounters:   18 118 lb (53.5 kg)   18 118 lb (53.5 kg)   18 129 lb (58.5 kg)              Today, you had the following     No orders found for display         Today's Medication Changes          These changes are accurate as of 18  9:06 AM.  If you have any questions, ask your nurse or doctor.               Stop taking these medicines if you haven't already. Please contact your care team if you have questions.     levalbuterol 45 MCG/ACT Inhaler   Commonly known as:  XOPENEX HFA   Stopped by:  Donald Lee MD                    Primary Care Provider Office Phone # Fax #    Silvia F MD Cassie " [phone]       [address] [facility] [facility] [address]  [facility] [address]        Equal Access to Services     SURINDER HUERTA : Hadii aad ku hadedendashawn Maricruzaurea, alexandramikie minoremmaha, alda karudolph laurent, martínez christophein hayaan jojorene king laRonakyane molina. So [facility] [phone].    ATENCIÓN: Si habla español, tiene a bernardo disposición servicios gratuitos de asistencia lingüística. Bingame al [phone].    We comply with applicable federal civil rights laws and [place] laws. We do not discriminate on the basis of race, color, national origin, age, disability, sex, sexual orientation, or gender identity.            Thank you!     Thank you for choosing [facility]  for your care. Our goal is always to provide you with excellent care. Hearing back from our patients is one way we can continue to improve our services. Please take a few minutes to complete the written survey that you may receive in the mail after your visit with us. Thank you!             Your Updated Medication List - Protect others around you: Learn how to safely use, store and throw away your medicines at www.disposemymeds.org.          This list is accurate as of 4/27/18  9:06 AM.  Always use your most recent med list.                   Brand Name Dispense Instructions for use Diagnosis    ACE/ARB/ARNI NOT PRESCRIBED (INTENTIONAL)      continuous prn. ACE & ARB not prescribed due to Other:BP controlled, no microalbuminuria        amLODIPine 5 MG tablet    NORVASC    30 tablet    Take 1 tablet (5 mg) by mouth daily    Essential hypertension       ASPIRIN NOT PRESCRIBED    INTENTIONAL    0 each    Please choose reason not prescribed, below    Essential hypertension with goal blood pressure less than 140/90       atorvastatin 10 MG tablet    LIPITOR    90 tablet    Take 1 tablet (10 mg) by mouth daily    Hyperlipidemia, unspecified       CALCIUM CITRATE + D PO      1,200 mg daily        eszopiclone 3 MG tablet    LUNESTA    90 tablet    Take 1 tablet (3  mg) by mouth nightly as needed    Insomnia, unspecified type       FLUoxetine 40 MG capsule    PROzac    30 capsule    Take 1 capsule (40 mg) by mouth daily    Mild major depression (H)       furosemide 20 MG tablet    LASIX     Take 20 mg by mouth        gabapentin 300 MG capsule    NEURONTIN    360 capsule    TAKE ONE CAPSULE BY MOUTH FOUR TIMES A DAY    Postherpetic neuralgia       lidocaine 5 % Patch    LIDODERM    30 patch    Apply up to 3 patches to painful area at once for up to 12 h within a 24 h period.  Remove after 12 hours.    Other chronic pain, Postherpetic neuralgia       Lutein 6 MG Tabs      Take  by mouth daily.        metoprolol succinate 100 MG 24 hr tablet    TOPROL-XL    90 tablet    Take 1 tablet (100 mg) by mouth 2 times daily    Essential hypertension with goal blood pressure less than 140/90       morphine 30 MG 12 hr tablet   Start taking on:  6/17/2018    MS CONTIN    60 tablet    Take 1 tablet (30 mg) by mouth 2 times daily    Other chronic pain       MULTI-VITAMIN PO      once daily        naloxone auto-injector    EVZIO    0.8 mL    Inject 0.4 mLs (0.4 mg) into the muscle as needed for opioid reversal every 2-3 minutes until assistance arrives    Chronic, continuous use of opioids       * nystatin 192133 UNIT/GM Powd    NYSTOP    120 g    APPLY 1 DOSE TOPICALLY THREE TIMES DAILY AS NEEDED    Intertrigo       * nystatin cream    MYCOSTATIN    60 g    APPLY TOPICALLY DAILY AS NEEDED(RASH UNDER BREAST AND IN GROIN)    Intertrigo       omeprazole 20 MG tablet      Take 20 mg by mouth daily        oxyCODONE-acetaminophen 7.5-325 MG per tablet   Start taking on:  6/17/2018    PERCOCET    90 tablet    Take 1 tablet by mouth every 8 hours as needed for pain    Other chronic pain       rivaroxaban ANTICOAGULANT 20 MG Tabs tablet    XARELTO     Take 20 mg by mouth        SENNA-GEN 8.6 MG tablet   Generic drug:  senna     120    2 TABLETS AT Twice daily    Unspecified constipation        triamcinolone 0.1 % cream    KENALOG    15 g    APPLY SPARINGLY EXTERNALLY TO THE AFFECTED AREA THREE TIMES DAILY FOR 14 DAYS    Dermatitis       * Notice:  This list has 2 medication(s) that are the same as other medications prescribed for you. Read the directions carefully, and ask your doctor or other care provider to review them with you.

## 2018-05-02 ENCOUNTER — TELEPHONE (OUTPATIENT)
Dept: FAMILY MEDICINE | Facility: OTHER | Age: 78
End: 2018-05-02

## 2018-05-02 NOTE — TELEPHONE ENCOUNTER
Spoke with pts , states pt is taking a nap right now. He will continue to monitor B/P and HR. Discussed home cares and when to call clinic or take pt to ED. No other questions or concerns at this time.     Jackie Harrison, RN, BSN

## 2018-05-02 NOTE — TELEPHONE ENCOUNTER
The list of reasons for fast heart rate are several. Physical activity, dehydration, anxiety, a fib or arrhythmia. Please clarify surrounding symptoms to help direct, may need ED if worsening.  Christy Sanchez MD

## 2018-05-02 NOTE — TELEPHONE ENCOUNTER
Please review and advise. Increase HR low 100s, B/P 99/65 on multiple B/P medications, lasix and pain medications. Only symptom is fatigue.    Katherin Jonas is a 77 year old female whose  calls with concerns with heartrate.    NURSING ASSESSMENT:  Description:  I spoke with pts  (C2C on file) stating at OV 4/17/2018 pt was restarted on Amlodipine 5mg daily for B/P 176/94 70    Blood pressures:  Saturday AM 4/21 177/82 HR 60-70 He did not record HR but states it is usually 60-70s  Monday 4/23 149/59  Tuesday 4/24 193/81  Wed 4/25 111/70  Thursday 4/26 133/100  Saturday 4/28 143/84  Sunday 4/29 124/85  Monday 4/30 142/88  This morning 5/2 99/65 97, for 3 hours HR has been just barely above 100    Onset/duration:  Today  Precip. factors:  HTN, CKD3  Associated symptoms:  Fatigued, no dizziness. States ankles are swollen but has got considerably better. No cough or fever. No chest pain.  Improves/worsens symptoms:  Did not address  Pain scale (0-10)     Allergies:   Allergies   Allergen Reactions     Antibiotic [Amides]      After awhile she has trouble swallowing     Nsaids Rash       NURSING PLAN: Routed to Provider pool per [facility] request.    RECOMMENDED DISPOSITION:  Wait to have provider review  Will comply with recommendation: N/A  If further questions/concerns or if symptoms do not improve, worsen or new symptoms develop, call your PCP or [facility] as soon as possible.      Guideline used: Heart Rate Problems  Telephone Triage Protocols for Nurses, Fifth Edition, Ruby Harrison RN

## 2018-05-03 ENCOUNTER — TRANSFERRED RECORDS (OUTPATIENT)
Dept: HEALTH INFORMATION MANAGEMENT | Facility: CLINIC | Age: 78
End: 2018-05-03

## 2018-05-04 ENCOUNTER — TRANSFERRED RECORDS (OUTPATIENT)
Dept: HEALTH INFORMATION MANAGEMENT | Facility: CLINIC | Age: 78
End: 2018-05-04

## 2018-05-10 NOTE — PROGRESS NOTES
SUBJECTIVE:   Katherin Jonas is a 77 year old female who presents to clinic today with her  for the following health issues:    HPI  Hyperlipidemia Follow-Up      Rate your low fat/cholesterol diet?: fair    Taking statin?  Yes, no muscle aches from statin    Other lipid medications/supplements?:  Fish oil/Omega 3    Hypertension Follow-up      Outpatient blood pressures are not being checked.    Low Salt Diet: no added salt    Depression and Anxiety Follow-Up    Status since last visit: Improved.  She is frustrated about her whispered voice.  However she feels mood wise that she is much improved and her  agrees.    Other associated symptoms:more of a racing mind when it happens     Complicating factors:     Significant life event: No     Current substance abuse: None    PHQ-9 2/23/2018 3/27/2018 4/17/2018   Total Score 8 6 6   Q9: Suicide Ideation Not at all Not at all Not at all     RACHID-7 SCORE 2/23/2018 3/27/2018 4/17/2018   Total Score - - -   Total Score 13 10 11       PHQ-9  English  PHQ-9   Any Language  RACHID-7  Suicide Assessment Five-step Evaluation and Treatment (SAFE-T)  COPD Follow-Up    Symptoms are currently: stable    Current fatigue or dyspnea with ambulation: none    Shortness of breath: stable    Increased or change in Cough/Sputum: Yes    Fever(s): No    Baseline ambulation without stopping to rest:  <1 blocks. Able to walk up 1 flights of stairs without stopping to rest.    Any ER/UC or hospital admissions since your last visit? No     History   Smoking Status     Former Smoker     Quit date: 1/1/1979   Smokeless Tobacco     Never Used     Comment: no smokers in household     No results found for: FEV1, UIE1YGX  Chronic Kidney Disease Follow-up      Current NSAID use?  No    Problem list and histories reviewed & adjusted, as indicated.  Additional history: as documented    Patient Active Problem List   Diagnosis     Esophageal reflux     Carotid atherosclerosis     CKD (chronic  kidney disease) stage 3, GFR 30-59 ml/min     Insomnia     Mild major depression (H)     Advanced directives, counseling/discussion     Essential hypertension     Hyperlipidemia, unspecified     Scoliosis     Osteopenia     Postherpetic neuralgia     Other chronic pain     COPD (chronic obstructive pulmonary disease) (H)     Paroxysmal atrial fibrillation (H)     Anxiety     Past Surgical History:   Procedure Laterality Date     C FULL ROUT OBSTE CARE, DELIV       C FULL ROUT OBSTE CARE, DELIV       C FULL ROUT OBSTE CARE, DELIV       C TOTAL KNEE ARTHROPLASTY  1997    left     C TOTAL KNEE ARTHROPLASTY  2005    right     C TREAT ECTOPIC PREG,RMV TUBE/OVARY  1967    left     COLONOSCOPY  11    [facility] REPAIR OF NASAL SEPTUM         Social History   Substance Use Topics     Smoking status: Former Smoker     Quit date: 1979     Smokeless tobacco: Never Used      Comment: no smokers in household     Alcohol use No      Comment: none since      Family History   Problem Relation Age of Onset     CANCER Daughter      Hypertension Mother            ROS:  CONSTITUTIONAL: NEGATIVE for fever, chills.  POSITIVE for fatigue and continued weight loss.  She states that she her weight has been stable at 117 pounds at home.  ENT/MOUTH: NEGATIVE for ear, mouth.  Has a hoarse and whispered voice.  Has seen pulmonology who did not feel is related to her steroid inhalers as she did not improve with stopping these.  Saw ENT who states that her left vocal cord is not moving up.  She states she had a CT which was negative for any masses.  She will be starting speech therapy.  RESP: NEGATIVE for significant cough or shortness of breath  CV: NEGATIVE for chest pain, palpitations.  Her peripheral edema is much better and she is finally wearing normal shoes.    OBJECTIVE:     /74  Pulse 94  Temp 98.1  F (36.7  C) (Temporal)  Resp 20  Ht 4'  "9.25\" (1.454 m)  Wt 111 lb (50.3 kg)  LMP 02/01/2011  SpO2 98%  BMI 23.81 kg/m2  Body mass index is 23.81 kg/(m^2).  Gen: no apparent distress, soft and whispery voice  NECK: no adenopathy, no asymmetry, no masses  Chest: clear to auscultation without wheeze, rale or rhonchi  Cor: Irregularly irregular  Ext: warm and dry with 1+ bilateral edema  Psych: Alert and oriented times 3; coherent speech, normal   rate and volume, able to articulate logical thoughts, able   to abstract reason, no tangential thoughts, no hallucinations   or delusions  Her affect is neutral    PHQ-9 and RACHID reviewed    ASSESSMENT/PLAN:       1. CKD (chronic kidney disease) stage 3, GFR 30-59 ml/min  Blood pressure controlled.  Her hemoglobin was mildly decreased a few months ago and will recheck this.  We will also recheck basic metabolic panel.  Patient is particularly concerned about her potassium and she is no longer taking a supplement.    - Hemoglobin  - Basic metabolic panel    2. Essential hypertension  Well-controlled.  Will continue on her current medication regimen.    - amLODIPine (NORVASC) 5 MG tablet; Take 1 tablet (5 mg) by mouth daily  Dispense: 90 tablet; Refill: 3  - Hemoglobin  - Basic metabolic panel    3. Mild major depression (H)  Much improved and will continue fluoxetine at current dose.    - FLUoxetine (PROZAC) 40 MG capsule; Take 1 capsule (40 mg) by mouth daily  Dispense: 90 capsule; Refill: 3    4. Other chronic pain  Discussed that her fatigue could be related to her chronic opioids.  I have recommended that she decrease her Percocet from 3 times a day to twice a day.  She is due to see me again in July for her pain and would consider further titration at that point.    - oxyCODONE-acetaminophen (PERCOCET) 7.5-325 MG per tablet; Take 1 tablet by mouth 2 times daily  Dispense: 60 tablet; Refill: 0    5. Vocal cord dysfunction  She is following with ENT and will be doing speech therapy.    Silvia Matthews, " MD  [facility]

## 2018-05-15 ENCOUNTER — TELEPHONE (OUTPATIENT)
Dept: FAMILY MEDICINE | Facility: OTHER | Age: 78
End: 2018-05-15

## 2018-05-15 ENCOUNTER — OFFICE VISIT (OUTPATIENT)
Dept: FAMILY MEDICINE | Facility: OTHER | Age: 78
End: 2018-05-15
Payer: COMMERCIAL

## 2018-05-15 VITALS
SYSTOLIC BLOOD PRESSURE: 132 MMHG | HEART RATE: 94 BPM | RESPIRATION RATE: 20 BRPM | OXYGEN SATURATION: 98 % | TEMPERATURE: 98.1 F | BODY MASS INDEX: 23.95 KG/M2 | WEIGHT: 111 LBS | DIASTOLIC BLOOD PRESSURE: 74 MMHG | HEIGHT: 57 IN

## 2018-05-15 DIAGNOSIS — G89.29 OTHER CHRONIC PAIN: ICD-10-CM

## 2018-05-15 DIAGNOSIS — I10 ESSENTIAL HYPERTENSION: ICD-10-CM

## 2018-05-15 DIAGNOSIS — J38.3 VOCAL CORD DYSFUNCTION: ICD-10-CM

## 2018-05-15 DIAGNOSIS — F32.0 MILD MAJOR DEPRESSION (H): ICD-10-CM

## 2018-05-15 DIAGNOSIS — N18.30 CKD (CHRONIC KIDNEY DISEASE) STAGE 3, GFR 30-59 ML/MIN (H): Primary | ICD-10-CM

## 2018-05-15 DIAGNOSIS — E87.6 HYPOKALEMIA: ICD-10-CM

## 2018-05-15 LAB
ANION GAP SERPL CALCULATED.3IONS-SCNC: 6 MMOL/L (ref 3–14)
BUN SERPL-MCNC: 12 MG/DL (ref 7–30)
CALCIUM SERPL-MCNC: 8.8 MG/DL (ref 8.5–10.1)
CHLORIDE SERPL-SCNC: 101 MMOL/L (ref 94–109)
CO2 SERPL-SCNC: 34 MMOL/L (ref 20–32)
CREAT SERPL-MCNC: 0.7 MG/DL (ref 0.52–1.04)
GFR SERPL CREATININE-BSD FRML MDRD: 81 ML/MIN/1.7M2
GLUCOSE SERPL-MCNC: 98 MG/DL (ref 70–99)
HGB BLD-MCNC: 12.7 G/DL (ref 11.7–15.7)
POTASSIUM SERPL-SCNC: 3.2 MMOL/L (ref 3.4–5.3)
SODIUM SERPL-SCNC: 141 MMOL/L (ref 133–144)

## 2018-05-15 PROCEDURE — 85018 HEMOGLOBIN: CPT | Performed by: FAMILY MEDICINE

## 2018-05-15 PROCEDURE — 80048 BASIC METABOLIC PNL TOTAL CA: CPT | Performed by: FAMILY MEDICINE

## 2018-05-15 PROCEDURE — 36415 COLL VENOUS BLD VENIPUNCTURE: CPT | Performed by: FAMILY MEDICINE

## 2018-05-15 PROCEDURE — 99214 OFFICE O/P EST MOD 30 MIN: CPT | Performed by: FAMILY MEDICINE

## 2018-05-15 RX ORDER — AMLODIPINE BESYLATE 5 MG/1
5 TABLET ORAL DAILY
Qty: 90 TABLET | Refills: 3 | Status: SHIPPED | OUTPATIENT
Start: 2018-05-15 | End: 2018-07-03

## 2018-05-15 RX ORDER — OXYCODONE AND ACETAMINOPHEN 7.5; 325 MG/1; MG/1
1 TABLET ORAL 2 TIMES DAILY
Qty: 60 TABLET | Refills: 0
Start: 2018-05-15 | End: 2018-07-03

## 2018-05-15 RX ORDER — MORPHINE SULFATE 30 MG/1
30 TABLET, FILM COATED, EXTENDED RELEASE ORAL 2 TIMES DAILY
Qty: 60 TABLET | Refills: 0 | Status: CANCELLED | OUTPATIENT
Start: 2018-06-17

## 2018-05-15 RX ORDER — FLUOXETINE 40 MG/1
40 CAPSULE ORAL DAILY
Qty: 90 CAPSULE | Refills: 3 | Status: SHIPPED | OUTPATIENT
Start: 2018-05-15 | End: 2019-05-31

## 2018-05-15 RX ORDER — LIDOCAINE 50 MG/G
PATCH TOPICAL
Qty: 30 PATCH | Refills: 11 | Status: CANCELLED | OUTPATIENT
Start: 2018-05-15

## 2018-05-15 RX ORDER — POTASSIUM CHLORIDE 1500 MG/1
20 TABLET, EXTENDED RELEASE ORAL DAILY
Qty: 30 TABLET | Refills: 1 | Status: SHIPPED | OUTPATIENT
Start: 2018-05-15 | End: 2018-07-03

## 2018-05-15 ASSESSMENT — ANXIETY QUESTIONNAIRES
6. BECOMING EASILY ANNOYED OR IRRITABLE: SEVERAL DAYS
2. NOT BEING ABLE TO STOP OR CONTROL WORRYING: SEVERAL DAYS
7. FEELING AFRAID AS IF SOMETHING AWFUL MIGHT HAPPEN: SEVERAL DAYS
3. WORRYING TOO MUCH ABOUT DIFFERENT THINGS: SEVERAL DAYS
IF YOU CHECKED OFF ANY PROBLEMS ON THIS QUESTIONNAIRE, HOW DIFFICULT HAVE THESE PROBLEMS MADE IT FOR YOU TO DO YOUR WORK, TAKE CARE OF THINGS AT HOME, OR GET ALONG WITH OTHER PEOPLE: SOMEWHAT DIFFICULT
1. FEELING NERVOUS, ANXIOUS, OR ON EDGE: SEVERAL DAYS
5. BEING SO RESTLESS THAT IT IS HARD TO SIT STILL: NOT AT ALL
GAD7 TOTAL SCORE: 5

## 2018-05-15 ASSESSMENT — PATIENT HEALTH QUESTIONNAIRE - PHQ9: 5. POOR APPETITE OR OVEREATING: NOT AT ALL

## 2018-05-15 ASSESSMENT — PAIN SCALES - GENERAL: PAINLEVEL: MILD PAIN (2)

## 2018-05-15 NOTE — TELEPHONE ENCOUNTER
LM for patient to return phone call to clinic about message below.  Juanita Bacon CMA ([facility])    Notes Recorded by Silvia Matthews MD on 5/15/2018 at 4:26 PM  Hemoglobin is back to normal.    Her potassium is only slightly low.  Let's restart a potassium supplement, one a day, and recheck at her next visit in 2 months.      Electronically signed by Silvia Matthews MD on 5/15/2018

## 2018-05-15 NOTE — MR AVS SNAPSHOT
After Visit Summary   5/15/2018    Katherin Jonas    MRN: [id number]           Patient Information     Date Of Birth          1940        Visit Information        Provider Department      5/15/2018 9:40 AM Silvia Matthews MD [facility]        Today's Diagnoses     CKD (chronic kidney disease) stage 3, GFR 30-59 ml/min    -  1    Essential hypertension        Mild major depression (H)        Other chronic pain        Postherpetic neuralgia        Vocal cord dysfunction           Follow-ups after your visit        Follow-up notes from your care team     Return in about 2 months (around 7/15/2018) for recheck pain.      Your next 10 appointments already scheduled     May 22, 2018 11:30 AM CDT   (Arrive by 11:00 AM)   New Visit with Tori Gil, PhD   [facility] ([facility])    [address] [place] [id number] [phone]            May 29, 2018  4:00 PM CDT   Return Visit with Tori Gil, PhD   [facility] ([facility])    [address] [place] [address] [phone] [phone]              Who to contact     If you have questions or need follow up information about today's clinic visit or your schedule please contact [facility] directly at [phone].  Normal or non-critical lab and imaging results will be communicated to you by MyChart, letter or phone within 4 business days after the clinic has received the results. If you do not hear from us within 7 days, please contact the clinic through MyChart or phone. If you have a critical or abnormal lab result, we will notify you by phone as soon as possible.  Submit refill requests through Satomi or call your pharmacy and they will forward the refill request to us. Please allow 3 business days for your refill to be completed.          Additional Information About Your  "Visit        Leximhart Information     AMCAD lets you send messages to your doctor, view your test results, renew your prescriptions, schedule appointments and more. To sign up, go to [email or web address] . Click on \"Log in\" on the left side of the screen, which will take you to the Welcome page. Then click on \"Sign up Now\" on the right side of the page.     You will be asked to enter the access code listed below, as well as some personal information. Please follow the directions to create your username and password.     Your access code is: [email or web address]  Expires: 2018  9:06 AM     Your access code will  in 90 days. If you need help or a new code, please call your [place] clinic or [phone].        Care EveryWhere ID     This is your Care EveryWhere ID. This could be used by other organizations to access your [place] medical records  [id number]        Your Vitals Were     Pulse Temperature Respirations Height Last Period Pulse Oximetry    94 98.1  F (36.7  C) (Temporal) 20 4' 9.25\" (1.454 m) 2011 98%    BMI (Body Mass Index)                   23.81 kg/m2            Blood Pressure from Last 3 Encounters:   05/15/18 132/74   18 137/48   18 (!) 176/94    Weight from Last 3 Encounters:   05/15/18 111 lb (50.3 kg)   18 118 lb (53.5 kg)   18 118 lb (53.5 kg)              We Performed the Following     Basic metabolic panel     Hemoglobin          Where to get your medicines      [id number] [place], [place] - [address] AT [facility]  [address], [place] [address]     Phone:  [phone]     amLODIPine 5 MG tablet    FLUoxetine 40 MG capsule          Primary Care Provider Office Phone # Fax #    Silvia Matthews [phone]       [address] [facility] [address]  [facility] [address]        Equal Access to Services     [facility] AH: isrrael Mitchell " ivánmadyson martínez zarco. [facility] [phone].    ATENCIÓN: Si alee rodriguez, tiene a bernardo disposición servicios gratuitos de asistencia lingüística. Tom al [phone].    We comply with applicable federal civil rights laws and [place] laws. We do not discriminate on the basis of race, color, national origin, age, disability, sex, sexual orientation, or gender identity.            Thank you!     Thank you for choosing [facility]  for your care. Our goal is always to provide you with excellent care. Hearing back from our patients is one way we can continue to improve our services. Please take a few minutes to complete the written survey that you may receive in the mail after your visit with us. Thank you!             Your Updated Medication List - Protect others around you: Learn how to safely use, store and throw away your medicines at www.disposemymeds.org.          This list is accurate as of 5/15/18 10:23 AM.  Always use your most recent med list.                   Brand Name Dispense Instructions for use Diagnosis    ACE/ARB/ARNI NOT PRESCRIBED (INTENTIONAL)      continuous prn. ACE & ARB not prescribed due to Other:BP controlled, no microalbuminuria        amLODIPine 5 MG tablet    NORVASC    90 tablet    Take 1 tablet (5 mg) by mouth daily    Essential hypertension       ASPIRIN NOT PRESCRIBED    INTENTIONAL    0 each    Please choose reason not prescribed, below    Essential hypertension with goal blood pressure less than 140/90       atorvastatin 10 MG tablet    LIPITOR    90 tablet    Take 1 tablet (10 mg) by mouth daily    Hyperlipidemia, unspecified       CALCIUM CITRATE + D PO      1,200 mg daily        eszopiclone 3 MG tablet    LUNESTA    90 tablet    Take 1 tablet (3 mg) by mouth nightly as needed    Insomnia, unspecified type       FLUoxetine 40 MG capsule    PROzac    90 capsule    Take 1 capsule (40 mg) by mouth daily     Mild major depression (H)       furosemide 20 MG tablet    LASIX     Take 20 mg by mouth        gabapentin 300 MG capsule    NEURONTIN    360 capsule    TAKE ONE CAPSULE BY MOUTH FOUR TIMES A DAY    Postherpetic neuralgia       lidocaine 5 % Patch    LIDODERM    30 patch    Apply up to 3 patches to painful area at once for up to 12 h within a 24 h period.  Remove after 12 hours.    Other chronic pain, Postherpetic neuralgia       Lutein 6 MG Tabs      Take  by mouth daily.        metoprolol succinate 100 MG 24 hr tablet    TOPROL-XL    90 tablet    Take 1 tablet (100 mg) by mouth 2 times daily    Essential hypertension with goal blood pressure less than 140/90       morphine 30 MG 12 hr tablet   Start taking on:  6/17/2018    MS CONTIN    60 tablet    Take 1 tablet (30 mg) by mouth 2 times daily    Other chronic pain       MULTI-VITAMIN PO      once daily        naloxone auto-injector    EVZIO    0.8 mL    Inject 0.4 mLs (0.4 mg) into the muscle as needed for opioid reversal every 2-3 minutes until assistance arrives    Chronic, continuous use of opioids       * nystatin 961932 UNIT/GM Powd    NYSTOP    120 g    APPLY 1 DOSE TOPICALLY THREE TIMES DAILY AS NEEDED    Intertrigo       * nystatin cream    MYCOSTATIN    60 g    APPLY TOPICALLY DAILY AS NEEDED(RASH UNDER BREAST AND IN GROIN)    Intertrigo       omeprazole 20 MG tablet      Take 20 mg by mouth daily        oxyCODONE-acetaminophen 7.5-325 MG per tablet   Start taking on:  6/17/2018    PERCOCET    90 tablet    Take 1 tablet by mouth every 8 hours as needed for pain    Other chronic pain       rivaroxaban ANTICOAGULANT 20 MG Tabs tablet    XARELTO     Take 20 mg by mouth        SENNA-GEN 8.6 MG tablet   Generic drug:  senna     120    2 TABLETS AT Twice daily    Unspecified constipation       triamcinolone 0.1 % cream    KENALOG    15 g    APPLY SPARINGLY EXTERNALLY TO THE AFFECTED AREA THREE TIMES DAILY FOR 14 DAYS    Dermatitis       * Notice:  This list  has 2 medication(s) that are the same as other medications prescribed for you. Read the directions carefully, and ask your doctor or other care provider to review them with you.

## 2018-05-16 ASSESSMENT — ANXIETY QUESTIONNAIRES: GAD7 TOTAL SCORE: 5

## 2018-05-16 ASSESSMENT — PATIENT HEALTH QUESTIONNAIRE - PHQ9: SUM OF ALL RESPONSES TO PHQ QUESTIONS 1-9: 2

## 2018-05-22 ENCOUNTER — OFFICE VISIT (OUTPATIENT)
Dept: PSYCHOLOGY | Facility: CLINIC | Age: 78
End: 2018-05-22
Attending: FAMILY MEDICINE
Payer: COMMERCIAL

## 2018-05-22 ENCOUNTER — FCC EXTENDED DOCUMENTATION (OUTPATIENT)
Dept: PSYCHOLOGY | Facility: CLINIC | Age: 78
End: 2018-05-22

## 2018-05-22 DIAGNOSIS — F41.1 GAD (GENERALIZED ANXIETY DISORDER): Primary | ICD-10-CM

## 2018-05-22 PROCEDURE — 90791 PSYCH DIAGNOSTIC EVALUATION: CPT | Performed by: PSYCHOLOGIST

## 2018-05-22 ASSESSMENT — ANXIETY QUESTIONNAIRES
6. BECOMING EASILY ANNOYED OR IRRITABLE: SEVERAL DAYS
2. NOT BEING ABLE TO STOP OR CONTROL WORRYING: MORE THAN HALF THE DAYS
IF YOU CHECKED OFF ANY PROBLEMS ON THIS QUESTIONNAIRE, HOW DIFFICULT HAVE THESE PROBLEMS MADE IT FOR YOU TO DO YOUR WORK, TAKE CARE OF THINGS AT HOME, OR GET ALONG WITH OTHER PEOPLE: SOMEWHAT DIFFICULT
5. BEING SO RESTLESS THAT IT IS HARD TO SIT STILL: NOT AT ALL
3. WORRYING TOO MUCH ABOUT DIFFERENT THINGS: MORE THAN HALF THE DAYS
GAD7 TOTAL SCORE: 8
1. FEELING NERVOUS, ANXIOUS, OR ON EDGE: MORE THAN HALF THE DAYS
7. FEELING AFRAID AS IF SOMETHING AWFUL MIGHT HAPPEN: SEVERAL DAYS

## 2018-05-22 ASSESSMENT — PATIENT HEALTH QUESTIONNAIRE - PHQ9: 5. POOR APPETITE OR OVEREATING: NOT AT ALL

## 2018-05-22 NOTE — Clinical Note
Hi Dr. Matthews,  I met with Katherin today and look forward to being part of her care - we plan to work on anxiety management. Please let me know if you have questions or concerns.  Thank you! Tori Gil, PhD, LP

## 2018-05-22 NOTE — MR AVS SNAPSHOT
"                  MRN:[id number]                      After Visit Summary   2018    Katherin Jonas    MRN: [id number]           Visit Information        Provider Department      2018 11:30 AM Tori Gil, PhD [facility] Generic      Your next 10 appointments already scheduled     May 29, 2018  4:00 PM CDT   Return Visit with Tori Gil, PhD   [facility] ([facility])    [address] [place] [id number] [phone]            2018  3:00 PM CDT   Return Visit with Tori Gil, PhD   [facility] ([facility])    [address] [place] [id number] [phone]              MyChart Information     [employer] lets you send messages to your doctor, view your test results, renew your prescriptions, schedule appointments and more. To sign up, go to [email or web address] . Click on \"Log in\" on the left side of the screen, which will take you to the Welcome page. Then click on \"Sign up Now\" on the right side of the page.     You will be asked to enter the access code listed below, as well as some personal information. Please follow the directions to create your username and password.     Your access code is: [email or web address]  Expires: 2018  9:06 AM     Your access code will  in 90 days. If you need help or a new code, please call your [place] clinic or [phone].        Care EveryWhere ID     This is your Care EveryWhere ID. This could be used by other organizations to access your [place] medical records  [id number]        Equal Access to Services     SURINDER HUERTA : Gopi Ireland, isrrael marcial, alda kaalmada mehran, martínez molina. [facility] [phone].    ATENCIÓN: Si habla español, tiene a bernardo disposición servicios gratuitos de " asistencia lingüística. Tom al [phone].    We comply with applicable federal civil rights laws and [place] laws. We do not discriminate on the basis of race, color, national origin, age, disability, sex, sexual orientation, or gender identity.
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
## 2018-05-23 ASSESSMENT — ANXIETY QUESTIONNAIRES: GAD7 TOTAL SCORE: 8

## 2018-05-23 ASSESSMENT — PATIENT HEALTH QUESTIONNAIRE - PHQ9: SUM OF ALL RESPONSES TO PHQ QUESTIONS 1-9: 3

## 2018-05-29 ENCOUNTER — OFFICE VISIT (OUTPATIENT)
Dept: PSYCHOLOGY | Facility: CLINIC | Age: 78
End: 2018-05-29
Attending: FAMILY MEDICINE
Payer: COMMERCIAL

## 2018-05-29 DIAGNOSIS — F41.1 GAD (GENERALIZED ANXIETY DISORDER): Primary | ICD-10-CM

## 2018-05-29 PROCEDURE — 90832 PSYTX W PT 30 MINUTES: CPT | Performed by: PSYCHOLOGIST

## 2018-05-29 NOTE — MR AVS SNAPSHOT
"                  MRN:[id number]                      After Visit Summary   2018    Katherin Jonas    MRN: [id number]           Visit Information        Provider Department      2018 4:00 PM Tori Gil, PhD [facility] Generic      Your next 10 appointments already scheduled     2018  3:00 PM CDT   Return Visit with Tori Gil, PhD   [facility] ([facility])    [address] [place] [id number] [phone]            2018 12:00 PM CDT   Office Visit with Silvia Matthews MD   [facility]    [address] [id number] [phone]           Bring a current list of meds and any records pertaining to this visit. For Physicals, please bring immunization records and any forms needing to be filled out. Please arrive 10 minutes early to complete paperwork.              [facility] Information     [employer] lets you send messages to your doctor, view your test results, renew your prescriptions, schedule appointments and more. To sign up, go to [email or web address] . Click on \"Log in\" on the left side of the screen, which will take you to the Welcome page. Then click on \"Sign up Now\" on the right side of the page.     You will be asked to enter the access code listed below, as well as some personal information. Please follow the directions to create your username and password.     Your access code is: [email or web address]  Expires: 2018  9:06 AM     Your access code will  in 90 days. If you need help or a new code, please call your [facility] or [phone].        Care EveryWhere ID     This is your Care EveryWhere ID. This could be used by other organizations to access your [place] medical records  [id number]        Equal Access to Services     SURINDER HUERTA AH: Gopi melgoza " Shu, isrrael marcial, alda kaalmamikie laurent, martínez molina. So [facility] [phone].    ATENCIÓN: Si habla español, tiene a bernardo disposición servicios gratuitos de asistencia lingüística. Llame al [phone].    We comply with applicable federal civil rights laws and [place] laws. We do not discriminate on the basis of race, color, national origin, age, disability, sex, sexual orientation, or gender identity.

## 2018-05-29 NOTE — PROGRESS NOTES
"                                             Progress Note    Client Name: Katherin Jonas  Date: 5/29/2018         Service Type: Individual      Session Start Time: 4:00  Session End Time: 4:26      Session Length: 16 minutes      Session #: 2     Attendees: Client attended alone           DATA      Progress Since Last Session (Related to Symptoms / Goals / Homework):   Symptoms: Stable    Homework: NA      Episode of Care Goals: Satisfactory progress - PREPARATION (Decided to change - considering how); Intervened by negotiating a change plan and determining options / strategies for behavior change, identifying triggers, exploring social supports, and working towards setting a date to begin behavior change     Current / Ongoing Stressors and Concerns:   Anxiety related to health issues and fears of dying in sleep     Treatment Objective(s) Addressed in This Session:   identify 3 fears / thoughts that contribute to feeling anxious       Intervention:   CBT: Began identifying triggers for anxiety and thoughts that exacerbate anxiety. Client reported that she has been having anxiety about having another heart attack. She stated, \"I feel vulnerable because I didn't know I was having one the first time I had one. So I have chest pains and I'm not sure if I should be calling a doctor or not.\" Client expressed frustration that she doesn't know what's going on with her voice (she lost her voice) and noted that she thought it would be better for our session today but asked to end the session early. Client also noted that she forgot to bring her hearing aides to the session today and was having difficulty hearing writer speak.        ASSESSMENT: Current Emotional / Mental Status (status of significant symptoms):   Risk status (Self / Other harm or suicidal ideation)   Client denies current fears or concerns for personal safety.   Client denies current or recent suicidal ideation or behaviors.   Client denies current or " recent homicidal ideation or behaviors.   Client denies current or recent self injurious behavior or ideation.   Client denies other safety concerns.   A safety and risk management plan has not been developed at this time, however client was given the after-hours number / 911 should there be a change in any of these risk factors.     Appearance:   Appropriate    Eye Contact:   Good    Psychomotor Behavior: Normal    Attitude:   Cooperative    Orientation:   All   Speech    Rate / Production: Normal     Volume:  Soft    Mood:    Normal   Affect:    Appropriate    Thought Content:  Clear    Thought Form:  Coherent  Logical    Insight:    Good      Medication Review:   No changes to current psychiatric medication(s)     Medication Compliance:   Yes     Changes in Health Issues:   None reported     Chemical Use Review:   Substance Use: Chemical use reviewed, no active concerns identified      Tobacco Use: No current tobacco use.       Collateral Reports Completed:   Not Applicable    PLAN: (Client Tasks / Therapist Tasks / Other)  RTC in two weeks. Client was encouraged to call and cancel if she does not feel up to attending session.        Tori Gil, PhD,                                                          ________________________________________________________________________    Treatment Plan    Client's Name: Katherin Jonas  YOB: 1940    Date: 5/29/2018    DSM5 Diagnoses: 300.02 (F41.1) Generalized Anxiety Disorder  Psychosocial & Contextual Factors: Client has had a number of health issues in the past few months (since February) and now experiences anxiety about dying or having to face additional medical issues.  WHODAS: 24    Referral / Collaboration:  Referral to another professional/service is not indicated at this time..    Anticipated number of session or this episode of care: 8-10      MeasurableTreatment Goal(s) related to diagnosis / functional impairment(s)  Goal 1:  Client will experience a reduction in anxiety.    I will know I've met my goal when I don't feel so worried all the time and I can relax.      Objective #A (Client Action)    Client will identify 3 fears / thoughts that contribute to feeling anxious.  Status: New - Date: 5/29/18     Intervention(s)  Therapist will help Client increase awareness of thought processes/patterns.    Objective #B  Client will use cognitive strategies identified in therapy to challenge anxious thoughts.  Status: New - Date: 5/29/18     Intervention(s)  Therapist will teach CBT strategies to challenge distorted thinking.    Objective #C  Client will use relaxation strategies 2 times per day to reduce the physical symptoms of anxiety.  Status: New - Date: 5/29/18     Intervention(s)  Therapist will teach relaxation strategies and healthy coping.      Objective #D  Client will exercise acceptance and identify what is within her control versus what is not.    Status: New - Date: 5/29/18     Intervention(s)  Therapist will teach mindfulness and acceptance.      Client has not reviewed nor agreed to the above plan. Will review together at the time of our next session.      Tori Gil, PhD, LP  May 29, 2018

## 2018-06-18 ENCOUNTER — TRANSFERRED RECORDS (OUTPATIENT)
Dept: HEALTH INFORMATION MANAGEMENT | Facility: CLINIC | Age: 78
End: 2018-06-18

## 2018-06-21 ENCOUNTER — TRANSFERRED RECORDS (OUTPATIENT)
Dept: HEALTH INFORMATION MANAGEMENT | Facility: CLINIC | Age: 78
End: 2018-06-21

## 2018-06-29 NOTE — PROGRESS NOTES
"  SUBJECTIVE:   Katherin Jonas is a 77 year old female who presents to clinic today for the following health issues:    HPI  Chronic Pain Follow-Up       Type / Location of Pain: back and neck  Analgesia/pain control:       Recent changes:  same      Overall control: Inadequate pain control  Activity level/function:      Daily activities:  Unable to perform most daily activities - chores, hobbies, social activities, driving    Work:  Unable to work  Adverse effects:  No  Adherance    Taking medication as directed?  \"Trying to\"    Participating in other treatments: yes - acupuncture   Risk Factors:    Sleep:  Good    Mood/anxiety:  worsened    Recent family or social stressors:  none noted    Other aggravating factors: .  PHQ-9 SCORE 4/17/2018 5/15/2018 5/22/2018   Total Score - - -   Total Score MyChart - - -   Total Score 6 2 3     RACHID-7 SCORE 4/17/2018 5/15/2018 5/22/2018   Total Score - - -   Total Score 11 5 8     Encounter-Level CSA - 05/18/2016:          Controlled Substance Agreement - Scan on 5/31/2016  2:21 PM : CONTROLLED SUBSTANCE AGREEMENT (below)               Since her last visit she has been trying to decrease her Percocet.  Some days she gets by with a half a tablet twice a day and other days she needs to take a full tablet twice a day.    Her  is here and states that he is concerned because she has had a couple of falls.  She states one was today where she was bending over in laundry basket and developed.  He states another one happened a couple of weeks ago.  She has no warning that these are happening.    She tells me that her mood is doing much better.  She states that she does not remember much of the first couple of months after her illness this winter.  She knows she was not thinking clearly but feels that that has improved.  She still feels quite fatigued.    She continues to have swelling of her lower legs.  They are improved from several months ago.    Problem list and histories " reviewed & adjusted, as indicated.  Additional history: as documented      Patient Active Problem List   Diagnosis     Esophageal reflux     Carotid atherosclerosis     CKD (chronic kidney disease) stage 3, GFR 30-59 ml/min     Insomnia     Mild major depression (H)     Advanced directives, counseling/discussion     Essential hypertension     Hyperlipidemia, unspecified     Scoliosis     Osteopenia     Postherpetic neuralgia     Other chronic pain     COPD (chronic obstructive pulmonary disease) (H)     Paroxysmal atrial fibrillation (H)     Anxiety     Past Surgical History:   Procedure Laterality Date     C FULL ROUT OBSTE CARE, DELIV       C FULL ROUT OBSTE CARE, DELIV       C FULL ROUT OBSTE CARE, DELIV       C TOTAL KNEE ARTHROPLASTY  1997    left     C TOTAL KNEE ARTHROPLASTY  2005    right     C TREAT ECTOPIC PREG,RMV TUBE/OVARY  1967    left     COLONOSCOPY  11    [facility]     HC REPAIR OF NASAL SEPTUM         Social History   Substance Use Topics     Smoking status: Former Smoker     Quit date: 1979     Smokeless tobacco: Never Used      Comment: no smokers in household     Alcohol use No      Comment: none since      Family History   Problem Relation Age of Onset     Substance Abuse Father      Cancer Daughter      Depression Daughter      Hypertension Mother            ROS:  CONSTITUTIONAL: NEGATIVE for fever, chills, change in weight  ENT/MOUTH: NEGATIVE for ear, mouth and throat problems  RESP: NEGATIVE for significant cough or SOB  CV: NEGATIVE for chest pain, palpitations    OBJECTIVE:     /64 (BP Location: Right arm, Patient Position: Chair, Cuff Size: Adult Regular)  Pulse 84  Temp 98.5  F (36.9  C) (Temporal)  Resp 18  Wt 114 lb (51.7 kg)  LMP 2011  SpO2 94%  BMI 24.45 kg/m2  Body mass index is 24.45 kg/(m^2).  Gen: no apparent distress  Voice:  Raspy voice  NECK: no adenopathy, no asymmetry, no  masses  Chest: clear to auscultation without wheeze, rale or rhonchi  Cor: regular rate and rhythm without murmur  ABDOMEN: soft, nontender, no masses and bowel sounds normal  Ext: warm and dry with bilateral 1+ edema to mid shin  Psych: Alert and oriented times 3; coherent speech, normal   rate and volume, able to articulate logical thoughts, able   to abstract reason, no tangential thoughts, no hallucinations   or delusions  Her affect is neutral    ASSESSMENT/PLAN:     1. Essential hypertension  Her blood pressure is on the low side today.  She is also having issues with peripheral edema.  She has had some falls which could be related to orthostatic hypotension.  We will decrease her amlodipine from 5 mg to 2.5 mg daily.  She has an appointment in a few weeks and will reassess her blood pressure.  If it is still low with then consider stopping amlodipine completely.    - amLODIPine (NORVASC) 5 MG tablet; Take 0.5 tablets (2.5 mg) by mouth daily  Dispense: 90 tablet; Refill: 3    2. Other chronic pain  Discussed that narcotics can contribute to her falls as well as some of her foggy thinking.  We have been slowly decreasing her dose of.  At this time I recommend decreasing her MS Contin from 30 mg twice a day to 15 mg 3 times a day.  We will continue her Percocet at 2 day.  We discussed that the goals of her opioid use is to use the least amount of medication that can control her pain as this medication does contribute to falls, cloudy thinking and blood pressure.    - morphine (MS CONTIN) 15 MG 12 hr tablet; Take 1 tablet (15 mg) by mouth 3 times daily  Dispense: 90 tablet; Refill: 0  - oxyCODONE-acetaminophen (PERCOCET) 7.5-325 MG per tablet; Take 1 tablet by mouth 2 times daily  Dispense: 60 tablet; Refill: 0  - [facility] REFERRAL    3. Falls frequently  We will also refer her to physical therapy for strengthening as she has felt weak with her extended illness this winter.    - [facility], [facility] CHIR[facility]RACTIC REFERRAL    4. Hypokalemia  She is unable to swallow the potassium pill and so has been eating bananas instead.  Will check potassium level.  If she still needs replacement would then consider liquid potassium.    - **Basic metabolic panel FUTURE 2mo    Silvia Matthews MD  [facility]

## 2018-07-03 ENCOUNTER — OFFICE VISIT (OUTPATIENT)
Dept: FAMILY MEDICINE | Facility: OTHER | Age: 78
End: 2018-07-03
Payer: COMMERCIAL

## 2018-07-03 VITALS
OXYGEN SATURATION: 94 % | DIASTOLIC BLOOD PRESSURE: 64 MMHG | HEART RATE: 84 BPM | TEMPERATURE: 98.5 F | SYSTOLIC BLOOD PRESSURE: 104 MMHG | WEIGHT: 114 LBS | RESPIRATION RATE: 18 BRPM | BODY MASS INDEX: 24.45 KG/M2

## 2018-07-03 DIAGNOSIS — I10 ESSENTIAL HYPERTENSION: Primary | ICD-10-CM

## 2018-07-03 DIAGNOSIS — R29.6 FALLS FREQUENTLY: ICD-10-CM

## 2018-07-03 DIAGNOSIS — E87.6 HYPOKALEMIA: ICD-10-CM

## 2018-07-03 DIAGNOSIS — G89.29 OTHER CHRONIC PAIN: ICD-10-CM

## 2018-07-03 LAB
ANION GAP SERPL CALCULATED.3IONS-SCNC: 14 MMOL/L (ref 3–14)
BUN SERPL-MCNC: 12 MG/DL (ref 7–30)
CALCIUM SERPL-MCNC: 8.4 MG/DL (ref 8.5–10.1)
CHLORIDE SERPL-SCNC: 100 MMOL/L (ref 94–109)
CO2 SERPL-SCNC: 25 MMOL/L (ref 20–32)
CREAT SERPL-MCNC: 0.71 MG/DL (ref 0.52–1.04)
GFR SERPL CREATININE-BSD FRML MDRD: 80 ML/MIN/1.7M2
GLUCOSE SERPL-MCNC: 96 MG/DL (ref 70–99)
POTASSIUM SERPL-SCNC: 3.7 MMOL/L (ref 3.4–5.3)
SODIUM SERPL-SCNC: 139 MMOL/L (ref 133–144)

## 2018-07-03 PROCEDURE — 36415 COLL VENOUS BLD VENIPUNCTURE: CPT | Performed by: FAMILY MEDICINE

## 2018-07-03 PROCEDURE — 80048 BASIC METABOLIC PNL TOTAL CA: CPT | Performed by: FAMILY MEDICINE

## 2018-07-03 PROCEDURE — 99214 OFFICE O/P EST MOD 30 MIN: CPT | Performed by: FAMILY MEDICINE

## 2018-07-03 RX ORDER — MORPHINE SULFATE 15 MG/1
15 TABLET, FILM COATED, EXTENDED RELEASE ORAL 3 TIMES DAILY
Qty: 90 TABLET | Refills: 0 | Status: SHIPPED | OUTPATIENT
Start: 2018-07-03 | End: 2018-07-03

## 2018-07-03 RX ORDER — MORPHINE SULFATE 15 MG/1
15 TABLET, FILM COATED, EXTENDED RELEASE ORAL 3 TIMES DAILY
Qty: 90 TABLET | Refills: 0 | Status: SHIPPED | OUTPATIENT
Start: 2018-08-02 | End: 2018-07-03

## 2018-07-03 RX ORDER — OXYCODONE AND ACETAMINOPHEN 7.5; 325 MG/1; MG/1
1 TABLET ORAL 2 TIMES DAILY
Qty: 60 TABLET | Refills: 0 | Status: SHIPPED | OUTPATIENT
Start: 2018-08-02 | End: 2018-07-03

## 2018-07-03 RX ORDER — OXYCODONE AND ACETAMINOPHEN 7.5; 325 MG/1; MG/1
1 TABLET ORAL 2 TIMES DAILY
Qty: 60 TABLET | Refills: 0 | Status: SHIPPED | OUTPATIENT
Start: 2018-07-03 | End: 2018-07-03

## 2018-07-03 RX ORDER — AMLODIPINE BESYLATE 5 MG/1
2.5 TABLET ORAL DAILY
Qty: 90 TABLET | Refills: 3
Start: 2018-07-03 | End: 2018-08-24

## 2018-07-03 RX ORDER — MORPHINE SULFATE 15 MG/1
15 TABLET, FILM COATED, EXTENDED RELEASE ORAL 3 TIMES DAILY
Qty: 90 TABLET | Refills: 0 | Status: SHIPPED | OUTPATIENT
Start: 2018-09-02 | End: 2018-07-27

## 2018-07-03 RX ORDER — OXYCODONE AND ACETAMINOPHEN 7.5; 325 MG/1; MG/1
1 TABLET ORAL 2 TIMES DAILY
Qty: 60 TABLET | Refills: 0 | Status: SHIPPED | OUTPATIENT
Start: 2018-09-02 | End: 2018-07-27

## 2018-07-03 ASSESSMENT — ANXIETY QUESTIONNAIRES
IF YOU CHECKED OFF ANY PROBLEMS ON THIS QUESTIONNAIRE, HOW DIFFICULT HAVE THESE PROBLEMS MADE IT FOR YOU TO DO YOUR WORK, TAKE CARE OF THINGS AT HOME, OR GET ALONG WITH OTHER PEOPLE: NOT DIFFICULT AT ALL
1. FEELING NERVOUS, ANXIOUS, OR ON EDGE: SEVERAL DAYS
GAD7 TOTAL SCORE: 5
5. BEING SO RESTLESS THAT IT IS HARD TO SIT STILL: NOT AT ALL
6. BECOMING EASILY ANNOYED OR IRRITABLE: SEVERAL DAYS
2. NOT BEING ABLE TO STOP OR CONTROL WORRYING: SEVERAL DAYS
7. FEELING AFRAID AS IF SOMETHING AWFUL MIGHT HAPPEN: SEVERAL DAYS
3. WORRYING TOO MUCH ABOUT DIFFERENT THINGS: SEVERAL DAYS

## 2018-07-03 ASSESSMENT — PATIENT HEALTH QUESTIONNAIRE - PHQ9: 5. POOR APPETITE OR OVEREATING: NOT AT ALL

## 2018-07-03 NOTE — MR AVS SNAPSHOT
After Visit Summary   7/3/2018    Katherin Jonas    MRN: [id number]           Patient Information     Date Of Birth          1940        Visit Information        Provider Department      7/3/2018 12:00 PM Silvia Matthews MD [facility]        Today's Diagnoses     Essential hypertension    -  1    Other chronic pain        Falls frequently        Hypokalemia           Follow-ups after your visit        Additional Services     [facility], AND CH[facility]RACTIC REFERRAL       **This order will print in the [facility] Scheduling Office**    Physical Therapy, Hand Therapy and Chiropractic Care are available through:    *[facility]  *[facility]  *[facility]    Call one number to schedule at any of the above locations: [phone].    Your provider has referred you to: Physical Therapy at [facility] or [facility]    Indication/Reason for Referral: fall frequently  Onset of Illness: months  Therapy Orders: Evaluate and Treat  Special Programs:   [facility]    Jayde Donohue      Additional Comments for the Therapist or Chiropractor:     Please be aware that coverage of these services is subject to the terms and limitations of your health insurance plan.  Call member services at your health plan with any benefit or coverage questions.      Please bring the following to your appointment:    *Your personal calendar for scheduling future appointments  *Comfortable clothing                  Your next 10 appointments already scheduled     Jul 27, 2018  8:40 AM CDT   Pre-Op physical with Silvia Matthews MD   [facility] ([facility])    [address] [place] [id number] [phone]              Who to contact     If you have questions or need follow up information about today's clinic visit or your schedule please contact [facility] directly at  "[phone].  Normal or non-critical lab and imaging results will be communicated to you by MyChart, letter or phone within 4 business days after the clinic has received the results. If you do not hear from us within 7 days, please contact the clinic through [facility] or phone. If you have a critical or abnormal lab result, we will notify you by phone as soon as possible.  Submit refill requests through [employer] or call your pharmacy and they will forward the refill request to us. Please allow 3 business days for your refill to be completed.          Additional Information About Your Visit        [facility]weetflow[facility] Information     [employer] lets you send messages to your doctor, view your test results, renew your prescriptions, schedule appointments and more. To sign up, go to [email or web address] . Click on \"Log in\" on the left side of the screen, which will take you to the Welcome page. Then click on \"Sign up Now\" on the right side of the page.     You will be asked to enter the access code listed below, as well as some personal information. Please follow the directions to create your username and password.     Your access code is: [email or web address]  Expires: 2018  9:06 AM     Your access code will  in 90 days. If you need help or a new code, please call your [facility] clinic or [phone].        Care EveryWhere ID     This is your Care EveryWhere ID. This could be used by other organizations to access your [facility] medical records  [id number]        Your Vitals Were     Pulse Temperature Respirations Last Period Pulse Oximetry BMI (Body Mass Index)    84 98.5  F (36.9  C) (Temporal) 18 2011 94% 24.45 kg/m2       Blood Pressure from Last 3 Encounters:   18 104/64   05/15/18 132/74   18 137/48    Weight from Last 3 Encounters:   18 114 lb (51.7 kg)   05/15/18 111 lb (50.3 kg)   18 118 lb (53.5 kg)              We Performed the Following     **Basic metabolic panel FUTURE 2mo     [facility] " [facility], AND CHIROPRACTIC REFERRAL          Today's Medication Changes          These changes are accurate as of 7/3/18 12:42 PM.  If you have any questions, ask your nurse or doctor.               Start taking these medicines.        Dose/Directions    morphine 15 MG 12 hr tablet   Commonly known as:  MS CONTIN   Used for:  Other chronic pain   Started by:  Silvia Matthews MD        Dose:  15 mg   Start taking on:  9/2/2018   Take 1 tablet (15 mg) by mouth 3 times daily   Quantity:  90 tablet   Refills:  0       oxyCODONE-acetaminophen 7.5-325 MG per tablet   Commonly known as:  PERCOCET   Used for:  Other chronic pain   Started by:  Silvia Matthews MD        Dose:  1 tablet   Start taking on:  9/2/2018   Take 1 tablet by mouth 2 times daily   Quantity:  60 tablet   Refills:  0         These medicines have changed or have updated prescriptions.        Dose/Directions    amLODIPine 5 MG tablet   Commonly known as:  NORVASC   This may have changed:  how much to take   Used for:  Essential hypertension   Changed by:  Silvia Matthews MD        Dose:  2.5 mg   Take 0.5 tablets (2.5 mg) by mouth daily   Quantity:  90 tablet   Refills:  3            Where to get your medicines      Some of these will need a paper prescription and others can be bought over the counter.  Ask your nurse if you have questions.     Bring a paper prescription for each of these medications     morphine 15 MG 12 hr tablet    oxyCODONE-acetaminophen 7.5-325 MG per tablet       You don't need a prescription for these medications     amLODIPine 5 MG tablet               Information about OPIOIDS     PRESCRIPTION OPIOIDS: WHAT YOU NEED TO KNOW   We gave you an opioid (narcotic) pain medicine. It is important to manage your pain, but opioids are not always the best choice. You should first try all the other options your care team gave you. Take this medicine for as short a time (and as few doses) as possible.     These medicines have  risks:    DO NOT drive when on new or higher doses of pain medicine. These medicines can affect your alertness and reaction times, and you could be arrested for driving under the influence (DUI). If you need to use opioids long-term, talk to your care team about driving.    DO NOT operate heave machinery    DO NOT do any other dangerous activities while taking these medicines.     DO NOT drink any alcohol while taking these medicines.      If the opioid prescribed includes acetaminophen, DO NOT take with any other medicines that contain acetaminophen. Read all labels carefully. Look for the word  acetaminophen  or  Tylenol.  Ask your pharmacist if you have questions or are unsure.    You can get addicted to pain medicines, especially if you have a history of addiction (chemical, alcohol or substance dependence). Talk to your care team about ways to reduce this risk.    Store your pills in a secure place, locked if possible. We will not replace any lost or stolen medicine. If you don t finish your medicine, please throw away (dispose) as directed by your pharmacist. The [place] Pollution Control Agency has more information about safe disposal: https://www.pca.[email or web address].[place].[email or web address]/living-green/managing-unwanted-medications.     All opioids tend to cause constipation. Drink plenty of water and eat foods that have a lot of fiber, such as fruits, vegetables, prune juice, apple juice and high-fiber cereal. Take a laxative (Miralax, milk of magnesia, Colace, Senna) if you don t move your bowels at least every other day.          Primary Care Provider Office Phone # Fax #    Silvia KING MD Cassie [phone]       [address] [place] [id number]        Equal Access to Services     [facility]: Hadii javier Ireland, waaxda luqadaha, qaybta kaalmartínez wilson . [facility] [phone].    ATENCIÓN: Si habla español, tiene a bernardo disposición servicios  kiran de asistencia lingüística. Tom marks [phone].    We comply with applicable federal civil rights laws and [place] laws. We do not discriminate on the basis of race, color, national origin, age, disability, sex, sexual orientation, or gender identity.            Thank you!     Thank you for choosing [facility]  for your care. Our goal is always to provide you with excellent care. Hearing back from our patients is one way we can continue to improve our services. Please take a few minutes to complete the written survey that you may receive in the mail after your visit with us. Thank you!             Your Updated Medication List - Protect others around you: Learn how to safely use, store and throw away your medicines at www.disposemymeds.org.          This list is accurate as of 7/3/18 12:42 PM.  Always use your most recent med list.                   Brand Name Dispense Instructions for use Diagnosis    ACE/ARB/ARNI NOT PRESCRIBED (INTENTIONAL)      continuous prn. ACE & ARB not prescribed due to Other:BP controlled, no microalbuminuria        amLODIPine 5 MG tablet    NORVASC    90 tablet    Take 0.5 tablets (2.5 mg) by mouth daily    Essential hypertension       ASPIRIN NOT PRESCRIBED    INTENTIONAL    0 each    Please choose reason not prescribed, below    Essential hypertension with goal blood pressure less than 140/90       atorvastatin 10 MG tablet    LIPITOR    90 tablet    Take 1 tablet (10 mg) by mouth daily    Hyperlipidemia, unspecified       CALCIUM CITRATE + D PO      1,200 mg daily        eszopiclone 3 MG tablet    LUNESTA    90 tablet    Take 1 tablet (3 mg) by mouth nightly as needed    Insomnia, unspecified type       FLUoxetine 40 MG capsule    PROzac    90 capsule    Take 1 capsule (40 mg) by mouth daily    Mild major depression (H)       furosemide 20 MG tablet    LASIX     Take 20 mg by mouth        gabapentin 300 MG capsule    NEURONTIN    360 capsule    TAKE ONE  CAPSULE BY MOUTH FOUR TIMES A DAY    Postherpetic neuralgia       lidocaine 5 % Patch    LIDODERM    30 patch    Apply up to 3 patches to painful area at once for up to 12 h within a 24 h period.  Remove after 12 hours.    Other chronic pain, Postherpetic neuralgia       Lutein 6 MG Tabs      Take  by mouth daily.        metoprolol succinate 100 MG 24 hr tablet    TOPROL-XL    90 tablet    Take 1 tablet (100 mg) by mouth 2 times daily    Essential hypertension with goal blood pressure less than 140/90       morphine 15 MG 12 hr tablet   Start taking on:  9/2/2018    MS CONTIN    90 tablet    Take 1 tablet (15 mg) by mouth 3 times daily    Other chronic pain       MULTI-VITAMIN PO      once daily        naloxone auto-injector    EVZIO    0.8 mL    Inject 0.4 mLs (0.4 mg) into the muscle as needed for opioid reversal every 2-3 minutes until assistance arrives    Chronic, continuous use of opioids       * nystatin [id number] UNIT/GM Powd    NYSTOP    120 g    APPLY 1 DOSE TOPICALLY THREE TIMES DAILY AS NEEDED    Intertrigo       * nystatin cream    MYCOSTATIN    60 g    APPLY TOPICALLY DAILY AS NEEDED(RASH UNDER BREAST AND IN GROIN)    Intertrigo       omeprazole 20 MG tablet      Take 20 mg by mouth daily        oxyCODONE-acetaminophen 7.5-325 MG per tablet   Start taking on:  9/2/2018    PERCOCET    60 tablet    Take 1 tablet by mouth 2 times daily    Other chronic pain       potassium chloride SA 20 MEQ CR tablet    KLOR-CON    30 tablet    Take 1 tablet (20 mEq) by mouth daily    Hypokalemia       rivaroxaban ANTICOAGULANT 20 MG Tabs tablet    XARELTO     Take 20 mg by mouth        SENNA-GEN 8.6 MG tablet   Generic drug:  senna     120    2 TABLETS AT Twice daily    Unspecified constipation       triamcinolone 0.1 % cream    KENALOG    15 g    APPLY SPARINGLY EXTERNALLY TO THE AFFECTED AREA THREE TIMES DAILY FOR 14 DAYS    Dermatitis       * Notice:  This list has 2 medication(s) that are the same as other medications  prescribed for you. Read the directions carefully, and ask your doctor or other care provider to review them with you.

## 2018-07-04 ASSESSMENT — PATIENT HEALTH QUESTIONNAIRE - PHQ9: SUM OF ALL RESPONSES TO PHQ QUESTIONS 1-9: 2

## 2018-07-04 ASSESSMENT — ANXIETY QUESTIONNAIRES: GAD7 TOTAL SCORE: 5

## 2018-07-05 ENCOUNTER — TELEPHONE (OUTPATIENT)
Dept: FAMILY MEDICINE | Facility: OTHER | Age: 78
End: 2018-07-05

## 2018-07-05 DIAGNOSIS — R31.0 GROSS HEMATURIA: Primary | ICD-10-CM

## 2018-07-05 NOTE — TELEPHONE ENCOUNTER
"TC-Pt's  wanted you to know she had an \"episode\" after clinic appt 7/3/2018, blood in urine x2, O2 sats 83%, fatigue and  7/3/2018. All symptoms have resolved. pt's  does not feel like they need to be seen but wanted to let you know. Please review and advise.      Next 5 appointments (look out 90 days)     Jul 27, 2018  8:40 AM CDT   Pre-Op physical with Silvia Matthews MD   [facility]    [address]  [place] [address] [phone]                Katherin Jonas is a 77 year old female who calls with blood in urine.    NURSING ASSESSMENT:  Description:  I spoke with pt's  who states they were in [place] 7/3/2018 after their OV earlier that day. Pt had to episode of red blood in urine x2. Low oxygen 83% and , Feeling tired. They decided to go home, pt slept all the way home and a lot the following day. O2 is 93% pulse is less then 100 now. He states they have no concerns at this time as she seems normal.  Onset/duration:  7/3/2018  Precip. factors:   A fib, chronic pain,   Associated symptoms:  No pain with urination, no fever, no changes in frequency  Improves/worsens symptoms:    Pain scale (0-10)   About the same.    Allergies:   Allergies   Allergen Reactions     Antibiotic [Amides]      After awhile she has trouble swallowing     Nsaids Rash       NURSING PLAN: Routed to provider Yes    RECOMMENDED DISPOSITION:  Home care advice, pt's  does not feel like they need to be seen but wanted to let TC know  Will comply with recommendation: Yes  If further questions/concerns or if symptoms do not improve, worsen or new symptoms develop, call your PCP or [facility] as soon as possible.      Guideline used: urine, abnormal color  Telephone Triage Protocols for Nurses, Fifth Edition, Ruby Harrison RN    "

## 2018-07-05 NOTE — TELEPHONE ENCOUNTER
Reason for call:  Symptom  Reason for call:  Patient reporting a symptom    Symptom or request: 2 episode of bloody urine    Duration (how long have symptoms been present): night of the 3rd    Have you been treated for this before? No    Additional comments: pt had 2 bloody espiodes of urine the night of the 3rd but cleared up by morning time.  did take her pulse and he stated it was high ( 120 ) and her oxygen was low ( 83 )    Phone Number patient can be reached at:  Home number on file [phone] (home)    Best Time:  anytime    Can we leave a detailed message on this number:  YES    Call taken on 7/5/2018 at 10:56 AM by Ambreen Lemus

## 2018-07-05 NOTE — TELEPHONE ENCOUNTER
Called and spoke with patient regarding message below.  Scheduled patient for tomorrow 7/6/18 for a lab only appointment.  Juanita Bacon CMA ([facility])

## 2018-07-06 DIAGNOSIS — R31.0 GROSS HEMATURIA: ICD-10-CM

## 2018-07-06 LAB
ALBUMIN UR-MCNC: 30 MG/DL
APPEARANCE UR: CLEAR
BILIRUB UR QL STRIP: ABNORMAL
COLOR UR AUTO: YELLOW
GLUCOSE UR STRIP-MCNC: NEGATIVE MG/DL
HGB UR QL STRIP: ABNORMAL
KETONES UR STRIP-MCNC: ABNORMAL MG/DL
LEUKOCYTE ESTERASE UR QL STRIP: NEGATIVE
NITRATE UR QL: NEGATIVE
PH UR STRIP: 6 PH (ref 5–7)
RBC #/AREA URNS AUTO: >100 /HPF
SOURCE: ABNORMAL
SP GR UR STRIP: 1.02 (ref 1–1.03)
UROBILINOGEN UR STRIP-ACNC: 4 EU/DL (ref 0.2–1)
WBC #/AREA URNS AUTO: ABNORMAL /HPF

## 2018-07-06 PROCEDURE — 87086 URINE CULTURE/COLONY COUNT: CPT | Performed by: FAMILY MEDICINE

## 2018-07-06 PROCEDURE — 81001 URINALYSIS AUTO W/SCOPE: CPT | Performed by: FAMILY MEDICINE

## 2018-07-07 LAB
BACTERIA SPEC CULT: NORMAL
SPECIMEN SOURCE: NORMAL

## 2018-07-09 ENCOUNTER — TELEPHONE (OUTPATIENT)
Dept: FAMILY MEDICINE | Facility: OTHER | Age: 78
End: 2018-07-09

## 2018-07-09 DIAGNOSIS — R31.0 GROSS HEMATURIA: Primary | ICD-10-CM

## 2018-07-09 NOTE — TELEPHONE ENCOUNTER
"RN triage phone assessment note: 7/9/2018  Katherin Jonas is a 77 year old female with nausea. I spoke with her today.    NURSING ASSESSMENT:  Description:  Katherin reports that she has had intermittent nausea for about 10 days or so. She has not vomited, \"I try, but I can't.\"  She's unsure if this may be related to her current collapsed vocal chord, (has upcoming surgery for this,) or withdrawal from cutting back on her narcotics, or something else. She denies any abdominal pain. Food doesn't seem to make it worse or better. She has an old rx for Ondansetron, which seems to help. She thinks she is well hydrated, pushing fluids and urinating often. She has difficulty speaking in full sentences, but denies SOB, states this is her baseline since her vocal chord issue began in April. She states this morning, she checked her O2 saturation and obtained a reading of 86%, \"I'm usually in the low 90s.\" Rechecked during our conversation and she is now at 92%, She denies chest pain at this time, but states she did have an episode of chest pain last night. \"It went away after not too long, I don't know how long it lasted.\" This is not something that's common for her.  Onset/duration:  Nausea for about 10 days  Precip. factors:  Unknown.  Associated symptoms:  Chest pain last night  Pain scale (0-10):   0/10 at this time  Medications related to the above issues: Ondansetron helps with nausea  Allergies:   Allergies   Allergen Reactions     Antibiotic [Amides]      After awhile she has trouble swallowing     Nsaids Rash       NURSING PLAN: Nursing advice to patient per triage protocol, see below    RECOMMENDED DISPOSITION:  To ED, another person to drive - due to nausea with episode of chest pain, change in O2sat level, recently not feeling well, several cardiac risk factors noted  Will comply with recommendation: Yes  If further questions/concerns or if symptoms do not improve, worsen or new symptoms develop, call your PCP or " [facility] as soon as possible.      Guideline used:  Telephone Triage Protocols for Nurses, Fifth Edition, Ruby Landeros  Nausea/vomiting, adult  NOTE:  Disposition was determined by the first positive assessment question in the listed triage protocol, therefore all previous assessment questions were negative.       Osmani Ramos, RN, BSN

## 2018-07-09 NOTE — TELEPHONE ENCOUNTER
Patient given results. She states she feels very nauseous and is wondering if she can be seen today. All provider in ER are full. Will have RN call to triage.  Anastasia Lock CMA

## 2018-07-09 NOTE — TELEPHONE ENCOUNTER
----- Message from Srinivasa Flores PA-C sent at 7/9/2018  8:35 AM CDT -----  TC patient. Please call and notify patient that urine culture did not grow any specific bacteria so no signs of UTI. She should monitor for any recurrent blood in her urine, urinary frequency or pain with urination and should be seen if symptoms return. I recommend repeating UA and BMP in 1 week. Thanks.    Srinivasa Flores PA-C

## 2018-07-17 ENCOUNTER — TRANSFERRED RECORDS (OUTPATIENT)
Dept: HEALTH INFORMATION MANAGEMENT | Facility: CLINIC | Age: 78
End: 2018-07-17

## 2018-07-17 ENCOUNTER — OFFICE VISIT (OUTPATIENT)
Dept: FAMILY MEDICINE | Facility: OTHER | Age: 78
End: 2018-07-17
Payer: COMMERCIAL

## 2018-07-17 VITALS
DIASTOLIC BLOOD PRESSURE: 64 MMHG | SYSTOLIC BLOOD PRESSURE: 124 MMHG | HEART RATE: 142 BPM | OXYGEN SATURATION: 97 % | RESPIRATION RATE: 18 BRPM | TEMPERATURE: 99.6 F

## 2018-07-17 DIAGNOSIS — I48.91 ATRIAL FIBRILLATION WITH RAPID VENTRICULAR RESPONSE (H): Primary | ICD-10-CM

## 2018-07-17 DIAGNOSIS — R11.0 NAUSEA: ICD-10-CM

## 2018-07-17 PROCEDURE — 99214 OFFICE O/P EST MOD 30 MIN: CPT | Performed by: FAMILY MEDICINE

## 2018-07-17 PROCEDURE — 93000 ELECTROCARDIOGRAM COMPLETE: CPT | Performed by: FAMILY MEDICINE

## 2018-07-17 RX ORDER — ONDANSETRON 4 MG/1
4 TABLET, ORALLY DISINTEGRATING ORAL EVERY 8 HOURS PRN
COMMUNITY
End: 2021-01-22

## 2018-07-17 NOTE — PROGRESS NOTES
SUBJECTIVE:   Katherin Jonas is a 77 year old female who presents to clinic today for the following health issues:    HPI  ED/UC Followup:    Facility:  [facility]  Date of visit: 7/9/18  Reason for visit: Chest pain, medication reaction   Current Status: Not improved      HPI:  Initial history obtained at 2:36 PM 07/09/18. History is obtained from the patient's  due to the patient's difficulty with speech.     Katherin Jonas is a 77 y.o. female with a history of myocardial infarction, chronic atrial fibrillation, HTN, HLD, renal disease, chronic pain, anxiety, and depression, currently anti-coagulated on Eliquis, who presents to the emergency department for evaluation of chest pain and medication reaction. The patient had a myocardial infarction five months ago and has since had follow up with her cardiologist who says that she's been healing well. She was seen by her PCP one week ago for her regular check-up, and her  states that they went to [place] afterwards for the weekend. While at her hotel in [place] several days ago, she had four episodes of hematuria and one episode of hemoptysis. Upon returning to the [facility] four days ago, she was seen by her PCP for this hematuria and had urine tests done. Since then, her  reports that she has experienced persistent, severe dyspnea, shortness of breath, chest discomfort, and generalized weakness. Associated nausea secondary to a hiatal hernia, for which she has repair scheduled on 8/3/2018. She also has been weaning herself off of her narcotic medications, which may be contributing to her nausea. The patient expresses concern that she may have a cardiac issue going on today. Her  states that she has lost about 20 lbs over the past two months. The patient denies any abdominal pain, diarrhea, constipation, dysuria, urinary frequency, other urinary symptom, pain elsewhere, or other worrisome symptoms. No further concerns or complaints  are voiced at this time.      I was reviewing the chart and her ER visit when I realized that her vitals were done.  Noticed that her pulse was 142 and went to evaluate the patient.  She was in a wheelchair slumped over and breathing heavily.  I verified that her heart rate was in the 140s.  At this point I asked the RN to assist me.  911 was called.  EKG confirms she was in rapid A. fib.  She denied having chest pain.  She admits that her breathing was a little more labored today and her  states that this was new today.  She has been feeling poorly for a week and a half.  She has had increased nausea.  Her morphine had been weaned at her last visit.  She had been on 30 mg MS Contin twice daily and this was decreased to 15 mg 3 times a day.  At the same time she had only been taking one half tablet of Percocet 7.5-325 twice daily and I asked her to increase this to a full tablet twice daily while decreasing her MS Contin.  A few days after this visit she developed hematuria.  This resolved on its own and a urinalysis was done which did not reveal infection.  She then started developing some nausea and abdominal pain.  She denied diarrhea or vomiting recently but does state she had diarrhea a few days ago.  Her  states that she has been taking all of her medications sporadically.  He estimates she is is perhaps taken them only half of the time.  This includes her blood pressure medicines her anticoagulants and her narcotics.  She is not taking any of her medications today.    Problem list and histories reviewed & adjusted, as indicated.  Additional history: as documented    Patient Active Problem List   Diagnosis     Esophageal reflux     Carotid atherosclerosis     CKD (chronic kidney disease) stage 3, GFR 30-59 ml/min     Insomnia     Mild major depression (H)     Advanced directives, counseling/discussion     Essential hypertension     Hyperlipidemia, unspecified     Scoliosis     Osteopenia      Postherpetic neuralgia     Other chronic pain     COPD (chronic obstructive pulmonary disease) (H)     Paroxysmal atrial fibrillation (H)     Anxiety     Past Surgical History:   Procedure Laterality Date     C FULL ROUT OBSTE CARE, DELIV       C FULL ROUT OBSTE CARE, DELIV       C FULL ROUT OBSTE CARE, DELIV  1970     C TOTAL KNEE ARTHROPLASTY  1997    left     C TOTAL KNEE ARTHROPLASTY  2005    right     C TREAT ECTOPIC PREG,RMV TUBE/OVARY  1967    left     COLONOSCOPY  11    [facility]     HC REPAIR OF NASAL SEPTUM         Social History   Substance Use Topics     Smoking status: Former Smoker     Quit date: 1979     Smokeless tobacco: Never Used      Comment: no smokers in household     Alcohol use No      Comment: none since      Family History   Problem Relation Age of Onset     Substance Abuse Father      Cancer Daughter      Depression Daughter      Hypertension Mother            OBJECTIVE:     /64 (BP Location: Right arm, Patient Position: Chair, Cuff Size: Adult Regular)  Pulse 142  Temp 99.6  F (37.6  C) (Temporal)  Resp 18  LMP 2011  SpO2 97%  There is no height or weight on file to calculate BMI.    Follow-up blood pressures were in the 150s over 160s over 90s.  Oxygen sats remained greater than 90% without oxygen.  Gen: She appears fatigued.  She continues to close her eyes.  She is responsive at all times.  Chest:  Clear  Cor: Irregularly irregular rate, tachycardic  Abd: Soft, nontender and without masses  Ext: No edema    EKG: Atrial fibrillation with rapid ventricular response    ASSESSMENT/PLAN:       1. Atrial fibrillation with rapid ventricular response (H)  She has not taken any of her medications today.  She is symptomatic with increased respiratory rate.  I feel she would be better cared for in the emergency department.  Patient agrees as does her .  Her  is not comfortable driving her.   911 was called.    2. Nausea  Difficult to tell whether this was in relation to decreasing her morphine or if there was another process.  I suspect that she is dehydrated that she has not been eating or drinking much.  She may also been some withdrawal as she has greatly decreased her opioid use over the last week.  Further evaluation to be done in the emergency department.    Silvia Matthews MD  [facility]

## 2018-07-17 NOTE — MR AVS SNAPSHOT
After Visit Summary   7/17/2018    Katherin Jonas    MRN: [id number]           Patient Information     Date Of Birth          1940        Visit Information        Provider Department      7/17/2018 2:00 PM Silvia Matthews MD [facility]        Today's Diagnoses     Atrial fibrillation with rapid ventricular response (H)    -  1    Nausea           Follow-ups after your visit        Your next 10 appointments already scheduled     Jul 27, 2018  8:40 AM CDT   Pre-Op physical with Silvia Matthews MD   [facility] ([facility])    [address]  [facility] [place] [facility] [id number] [phone]            Jul 27, 2018  4:00 PM CDT   Evaluation with Sharon Vila PT   [facility]    [address] [facility] [address] [phone]              Who to contact     If you have questions or need follow up information about today's clinic visit or your schedule please contact [facility] directly at [phone].  Normal or non-critical lab and imaging results will be communicated to you by MyChart, letter or phone within 4 business days after the clinic has received the results. If you do not hear from us within 7 days, please contact the clinic through MyChart or phone. If you have a critical or abnormal lab result, we will notify you by phone as soon as possible.  Submit refill requests through [employer] or call your pharmacy and they will forward the refill request to us. Please allow 3 business days for your refill to be completed.          Additional Information About Your Visit        Care EveryWhere ID     This is your Care EveryWhere ID. This could be used by other organizations to access your [place] medical records  [id number]        Your Vitals Were     Pulse Temperature Respirations Last Period Pulse Oximetry       142 99.6  F (37.6  C) (Temporal) 18  02/01/2011 97%        Blood Pressure from Last 3 Encounters:   07/17/18 124/64   07/03/18 104/64   05/15/18 132/74    Weight from Last 3 Encounters:   07/03/18 114 lb (51.7 kg)   05/15/18 111 lb (50.3 kg)   04/27/18 118 lb (53.5 kg)              We Performed the Following     EKG 12-lead complete w/read - Clinics        Primary Care Provider Office Phone # Fax #    Silvia KING MD Cassie [phone]       [address]  [facility] [address]        Equal Access to Services     [facility]: Hadii javier esposito hadasho Soaurea, waaxda luqadaha, qaybta kaalmada adereneyada, martínez ramirez . So [facility] [phone].    ATENCIÓN: Si habla español, tiene a bernardo disposición servicios gratuitos de asistencia lingüística. Llame al [phone].    We comply with applicable federal civil rights laws and [place] laws. We do not discriminate on the basis of race, color, national origin, age, disability, sex, sexual orientation, or gender identity.            Thank you!     Thank you for choosing [facility] [place]  for your care. Our goal is always to provide you with excellent care. Hearing back from our patients is one way we can continue to improve our services. Please take a few minutes to complete the written survey that you may receive in the mail after your visit with us. Thank you!             Your Updated Medication List - Protect others around you: Learn how to safely use, store and throw away your medicines at www.disposemymeds.org.          This list is accurate as of 7/17/18  5:34 PM.  Always use your most recent med list.                   Brand Name Dispense Instructions for use Diagnosis    ACE/ARB/ARNI NOT PRESCRIBED (INTENTIONAL)      continuous prn. ACE & ARB not prescribed due to Other:BP controlled, no microalbuminuria        amLODIPine 5 MG tablet    NORVASC    90 tablet    Take 0.5 tablets (2.5 mg) by mouth daily    Essential hypertension       ASPIRIN NOT  PRESCRIBED    INTENTIONAL    0 each    Please choose reason not prescribed, below    Essential hypertension with goal blood pressure less than 140/90       atorvastatin 10 MG tablet    LIPITOR    90 tablet    Take 1 tablet (10 mg) by mouth daily    Hyperlipidemia, unspecified       CALCIUM CITRATE + D PO      1,200 mg daily        eszopiclone 3 MG tablet    LUNESTA    90 tablet    Take 1 tablet (3 mg) by mouth nightly as needed    Insomnia, unspecified type       FLUoxetine 40 MG capsule    PROzac    90 capsule    Take 1 capsule (40 mg) by mouth daily    Mild major depression (H)       furosemide 20 MG tablet    LASIX     Take 20 mg by mouth        gabapentin 300 MG capsule    NEURONTIN    360 capsule    TAKE ONE CAPSULE BY MOUTH FOUR TIMES A DAY    Postherpetic neuralgia       lidocaine 5 % Patch    LIDODERM    30 patch    Apply up to 3 patches to painful area at once for up to 12 h within a 24 h period.  Remove after 12 hours.    Other chronic pain, Postherpetic neuralgia       Lutein 6 MG Tabs      Take  by mouth daily.        metoprolol succinate 100 MG 24 hr tablet    TOPROL-XL    90 tablet    Take 1 tablet (100 mg) by mouth 2 times daily    Essential hypertension with goal blood pressure less than 140/90       morphine 15 MG 12 hr tablet   Start taking on:  9/2/2018    MS CONTIN    90 tablet    Take 1 tablet (15 mg) by mouth 3 times daily    Other chronic pain       MULTI-VITAMIN PO      once daily        naloxone auto-injector    EVZIO    0.8 mL    Inject 0.4 mLs (0.4 mg) into the muscle as needed for opioid reversal every 2-3 minutes until assistance arrives    Chronic, continuous use of opioids       * nystatin 052604 UNIT/GM Powd    NYSTOP    120 g    APPLY 1 DOSE TOPICALLY THREE TIMES DAILY AS NEEDED    Intertrigo       * nystatin cream    MYCOSTATIN    60 g    APPLY TOPICALLY DAILY AS NEEDED(RASH UNDER BREAST AND IN GROIN)    Intertrigo       omeprazole 20 MG tablet      Take 20 mg by mouth daily         ondansetron 4 MG ODT tab    ZOFRAN-ODT     Take 4 mg by mouth every 8 hours as needed for nausea        oxyCODONE-acetaminophen 7.5-325 MG per tablet   Start taking on:  9/2/2018    PERCOCET    60 tablet    Take 1 tablet by mouth 2 times daily    Other chronic pain       rivaroxaban ANTICOAGULANT 20 MG Tabs tablet    XARELTO     Take 20 mg by mouth        SENNA-GEN 8.6 MG tablet   Generic drug:  senna     120    2 TABLETS AT Twice daily    Unspecified constipation       triamcinolone 0.1 % cream    KENALOG    15 g    APPLY SPARINGLY EXTERNALLY TO THE AFFECTED AREA THREE TIMES DAILY FOR 14 DAYS    Dermatitis       * Notice:  This list has 2 medication(s) that are the same as other medications prescribed for you. Read the directions carefully, and ask your doctor or other care provider to review them with you.

## 2018-07-19 LAB — EJECTION FRACTION: 68

## 2018-07-20 ENCOUNTER — TRANSFERRED RECORDS (OUTPATIENT)
Dept: HEALTH INFORMATION MANAGEMENT | Facility: CLINIC | Age: 78
End: 2018-07-20

## 2018-07-20 LAB — EJECTION FRACTION: 53
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
## 2018-07-23 NOTE — PATIENT INSTRUCTIONS
Call Cardiology and ask if it is alright to proceed with your planned vocal cord procedure, or if this should be delayed.      Before Your Surgery      Call your surgeon if there is any change in your health. This includes signs of a cold or flu (such as a sore throat, runny nose, cough, rash or fever).    Do not smoke, drink alcohol or take over the counter medicine (unless your surgeon or primary care doctor tells you to) for the 24 hours before and after surgery.    If you take prescribed drugs: Follow your doctor s orders about which medicines to take and which to stop until after surgery.    Eating and drinking prior to surgery: follow the instructions from your surgeon    Take a shower or bath the night before surgery. Use the soap your surgeon gave you to gently clean your skin. If you do not have soap from your surgeon, use your regular soap. Do not shave or scrub the surgery site.  Wear clean pajamas and have clean sheets on your bed.

## 2018-07-24 ENCOUNTER — TELEPHONE (OUTPATIENT)
Dept: FAMILY MEDICINE | Facility: OTHER | Age: 78
End: 2018-07-24

## 2018-07-24 NOTE — TELEPHONE ENCOUNTER
Patient wanted clarification on how to take the morphine and potassium. Discussed to follow discharge instructions from [facility]. Morphine BID and no potassium.    Smiley Shannon, RN, BSN

## 2018-07-24 NOTE — TELEPHONE ENCOUNTER
Reason for call:  Pt was in the hospital and was prescribed morphine and potassium and she has some question in regards to those medications. Please advise.

## 2018-07-26 NOTE — PROGRESS NOTES
SUBJECTIVE:   Katherin Jonas is a 77 year old female who presents to clinic today for the following health issues:      HPI  ED/UC Followup:    Facility:  [facility]  Date of visit: 8/4/18  Reason for visit: Breathing problem  Current Status: Improved    Katherin Jonas is a 76 yo female with a history of CHF (diastolic), paroxysmal atrial fibrillation, asthma (on 3L intermittently at baseline), hypertension, who was admitted to [facility] on 8/2/2018 after presenting to ED for evaluation of acute shortness of breath/tachypnea.     In the emergency department, she was noted to be hypertensive,bradycardia with a elevated BNP(2,451). She was placed on BiPAP and diuresed. She had improvement in her breathing overnight, but not a significant diuresis. Her heart rate improved with holding her metoprolol. A echocardiogram showed EF 63%, severely enlarged left/right atrium, moderate mitral regurgitation, and mild aortic regurgitation. It was felt her shortness of breath was due to fluid overload from poor heart function due to significant bradycardia. She has little pulmonary reserve given her restrictive lung disease, and so only mild fluid accumulation would see to effect her significantly. Cardiology recommended resuming her amiodarone and metoprolol, but at lower doses. Her potassium was mildly low given extra diuresis, and she was given potassium liquid for discharge.(difficulty swallowing large potassium pills) I did recommend close recheck of her potassium given her levels over the past several months have been variable and she is on both lasix and spironolactone.  .        She states that the breathing issues came on suddenly.  Now that she is home she feels much improved.  She still really wants to get her vocal cord injection done.  Cardiology had recommended waiting until she has been on warfarin for at least 30 days.  She now has the procedure scheduled next week.  We will have her return next week for a  preop.  In the meantime we will attempt to contact cardiology to see if there still willing for her to have the procedure after her recent hospitalization.    She denies nausea.  Denies chest pain.  She states she has not been taking any potassium at all as she does not like the taste of the liquid potassium.  She wants to have her potassium level checked and if it is okay they would prefer not to be on potassium or would consider trying the effervescent or crumbling up a pill.    Her pain medications continue to be switched around.  At last visit she was taking MS Contin 15 mg twice a day and Percocet 7.5-325 3 times a day.  Since hospitalization she has been taking her MS Contin 3 times a day and Percocet twice a day.  She feels the Percocet works better especially with her back pain.    Her  tells me that her pulse was in the 30s when she was in the hospital is concerned that her pulse is now in the 40s.  Her amiodarone had been decreased from 400 mg twice a day to 400 mg once a day and her metoprolol had been decreased from 150 mg twice a day to 100 mg once a day.    Problem list and histories reviewed & adjusted, as indicated.  Additional history: as documented    Patient Active Problem List   Diagnosis     Esophageal reflux     Carotid atherosclerosis     CKD (chronic kidney disease) stage 3, GFR 30-59 ml/min     Insomnia     Mild major depression (H)     Advanced directives, counseling/discussion     Essential hypertension     Hyperlipidemia, unspecified     Scoliosis     Osteopenia     Postherpetic neuralgia     Other chronic pain     COPD (chronic obstructive pulmonary disease) (H)     Paroxysmal atrial fibrillation (H)     Anxiety     Hypokalemia     Anticoagulant long-term use     Heart failure with preserved left ventricular function (HFpEF) (H)     Past Surgical History:   Procedure Laterality Date     C FULL ROUT OBSTE CARE, DELIV       C FULL ROUT OBSTE CARE, DELIV        C FULL ROUT OBSTE CARE, DELIV  1970     C TOTAL KNEE ARTHROPLASTY  1997    left     C TOTAL KNEE ARTHROPLASTY  2005    right     C TREAT ECTOPIC PREG,RMV TUBE/OVARY  1967    left     COLONOSCOPY  11    [facility] REPAIR OF NASAL SEPTUM         Social History   Substance Use Topics     Smoking status: Former Smoker     Quit date: 1979     Smokeless tobacco: Never Used      Comment: no smokers in household     Alcohol use No      Comment: none since 2006     Family History   Problem Relation Age of Onset     Substance Abuse Father      Cancer Daughter      Depression Daughter      Hypertension Mother            ROS:  CONSTITUTIONAL: NEGATIVE for fever, chills  ENT/MOUTH: NEGATIVE for ear, mouth and throat problems  RESP: NEGATIVE for significant cough   CV: NEGATIVE for chest pain, palpitations or peripheral edema    OBJECTIVE:     /70 (BP Location: Left arm, Patient Position: Chair, Cuff Size: Adult Regular)  Pulse (!) 47  Temp 97.8  F (36.6  C) (Temporal)  Resp 14  Wt 106 lb (48.1 kg)  LMP 2011  SpO2 96%  BMI 22.74 kg/m2  Body mass index is 22.74 kg/(m^2).  Gen:  no apparent distress, hoarse voice  Chest:  Clear without wheeze, rale or rhonchi  Cor:  regular rate and rhythm  Abd: soft, nontender, no mass  Ext: no edema  Psych: Alert and oriented times 3; coherent speech, normal   rate and volume, able to articulate logical thoughts, able   to abstract reason, no tangential thoughts, no hallucinations   or delusions  Her affect is neutral    ASSESSMENT/PLAN:     1. Heart failure with preserved left ventricular function (HFpEF) (H)  Her symptoms have improved.  We will continue her on her diuretics without change.  Continue the lower dose of amiodarone.  She has a follow-up with cardiology in a month.  Her heart rate is still on the low side and with a rate of 47, will decrease her Toprol from 100 mg daily to 50 mg daily.  She will follow-up  with me next week for an intended preop for her vocal cord procedure.  Will also attempt to touch base with cardiology to see if they feel this is reasonable.  Will check a basic metabolic panel today and pay particular attention to her potassium.    - Basic metabolic panel    2. Paroxysmal atrial fibrillation (H)  She continues on Xarelto.    3. Other chronic pain  We discussed and decided to return to MS Contin 15 mg twice daily and her Percocet 3 times daily.    Silvia Matthews MD  [facility]

## 2018-07-27 ENCOUNTER — OFFICE VISIT (OUTPATIENT)
Dept: FAMILY MEDICINE | Facility: OTHER | Age: 78
End: 2018-07-27
Payer: COMMERCIAL

## 2018-07-27 VITALS
SYSTOLIC BLOOD PRESSURE: 144 MMHG | WEIGHT: 110 LBS | OXYGEN SATURATION: 98 % | BODY MASS INDEX: 23.6 KG/M2 | TEMPERATURE: 97.2 F | HEART RATE: 55 BPM | DIASTOLIC BLOOD PRESSURE: 76 MMHG

## 2018-07-27 DIAGNOSIS — I48.0 PAROXYSMAL ATRIAL FIBRILLATION (H): Primary | ICD-10-CM

## 2018-07-27 DIAGNOSIS — I50.30 HEART FAILURE WITH PRESERVED LEFT VENTRICULAR FUNCTION (HFPEF) (H): ICD-10-CM

## 2018-07-27 DIAGNOSIS — G89.29 OTHER CHRONIC PAIN: ICD-10-CM

## 2018-07-27 PROBLEM — E87.6 HYPOKALEMIA: Status: ACTIVE | Noted: 2018-07-17

## 2018-07-27 PROBLEM — Z79.01 ANTICOAGULANT LONG-TERM USE: Status: ACTIVE | Noted: 2018-07-20

## 2018-07-27 LAB
ANION GAP SERPL CALCULATED.3IONS-SCNC: 7 MMOL/L (ref 3–14)
BUN SERPL-MCNC: 23 MG/DL (ref 7–30)
CALCIUM SERPL-MCNC: 9.2 MG/DL (ref 8.5–10.1)
CHLORIDE SERPL-SCNC: 101 MMOL/L (ref 94–109)
CO2 SERPL-SCNC: 31 MMOL/L (ref 20–32)
CREAT SERPL-MCNC: 0.96 MG/DL (ref 0.52–1.04)
ERYTHROCYTE [DISTWIDTH] IN BLOOD BY AUTOMATED COUNT: 15.4 % (ref 10–15)
GFR SERPL CREATININE-BSD FRML MDRD: 56 ML/MIN/1.7M2
GLUCOSE SERPL-MCNC: 91 MG/DL (ref 70–99)
HCT VFR BLD AUTO: 33.6 % (ref 35–47)
HGB BLD-MCNC: 10.8 G/DL (ref 11.7–15.7)
MCH RBC QN AUTO: 30.6 PG (ref 26.5–33)
MCHC RBC AUTO-ENTMCNC: 32.1 G/DL (ref 31.5–36.5)
MCV RBC AUTO: 95 FL (ref 78–100)
PLATELET # BLD AUTO: 218 10E9/L (ref 150–450)
POTASSIUM SERPL-SCNC: 4.4 MMOL/L (ref 3.4–5.3)
RBC # BLD AUTO: 3.53 10E12/L (ref 3.8–5.2)
SODIUM SERPL-SCNC: 139 MMOL/L (ref 133–144)
WBC # BLD AUTO: 5.5 10E9/L (ref 4–11)

## 2018-07-27 PROCEDURE — 99214 OFFICE O/P EST MOD 30 MIN: CPT | Performed by: FAMILY MEDICINE

## 2018-07-27 PROCEDURE — 36415 COLL VENOUS BLD VENIPUNCTURE: CPT | Performed by: FAMILY MEDICINE

## 2018-07-27 PROCEDURE — 80048 BASIC METABOLIC PNL TOTAL CA: CPT | Performed by: FAMILY MEDICINE

## 2018-07-27 PROCEDURE — 85027 COMPLETE CBC AUTOMATED: CPT | Performed by: FAMILY MEDICINE

## 2018-07-27 RX ORDER — AMIODARONE HYDROCHLORIDE 200 MG/1
400 TABLET ORAL DAILY
COMMUNITY
Start: 2018-07-27 | End: 2019-11-22

## 2018-07-27 RX ORDER — OXYCODONE AND ACETAMINOPHEN 7.5; 325 MG/1; MG/1
1 TABLET ORAL 3 TIMES DAILY
Qty: 90 TABLET | Refills: 0 | Status: SHIPPED | OUTPATIENT
Start: 2018-07-27 | End: 2018-09-14

## 2018-07-27 RX ORDER — RIVAROXABAN 15 MG/1
15 TABLET, FILM COATED ORAL DAILY
Refills: 0 | COMMUNITY
Start: 2018-07-21 | End: 2018-08-24

## 2018-07-27 RX ORDER — MORPHINE SULFATE 15 MG/1
15 TABLET, FILM COATED, EXTENDED RELEASE ORAL EVERY 12 HOURS
Qty: 60 TABLET | Refills: 0 | Status: SHIPPED | OUTPATIENT
Start: 2018-07-27 | End: 2018-09-14

## 2018-07-27 NOTE — PROGRESS NOTES
SUBJECTIVE:   Katherin Jonas is a 77 year old female who presents to clinic today for the following health issues:      HPI      Hospital Follow-up Visit:    Hospital/Nursing Home/IP Rehab Facility: [facility]  Date of Admission: 7/17/2018  Date of Discharge: 7/21/2018  Reason(s) for Admission: Low potassium and heart palpitations             Problems taking medications regularly:  None       Medication changes since discharge: Yes       Problems adhering to non-medication therapy:  None  Summary of hospitalization:  CareEverywhere information obtained and reviewed  Diagnostic Tests/Treatments reviewed.  Follow up needed: Cardiology  Other Healthcare Providers Involved in Patient s Care:         None  Update since discharge: improved.  She feels she has more energy.  She is not as short of breath.  She does admit to dizziness at times.  She has not taken her morning medications today.  In the hospital she was changed from MS Contin 15 mg 3 times daily to twice daily and Percocet 7.5 mg twice daily to 3 times daily.  She would prefer to stay with this regimen for her pain.    Post Discharge Medication Reconciliation: discharge medications reconciled and changed, per note/orders (see AVS).  Plan of care communicated with patient and family     Coding guidelines for this visit:  Type of Medical   Decision Making Face-to-Face Visit       within 7 Days of discharge Face-to-Face Visit        within 14 days of discharge   Moderate Complexity 88017 49532   High Complexity 53251 18722            Problem list and histories reviewed & adjusted, as indicated.  Additional history: as documented    Patient Active Problem List   Diagnosis     Esophageal reflux     Carotid atherosclerosis     CKD (chronic kidney disease) stage 3, GFR 30-59 ml/min     Insomnia     Mild major depression (H)     Advanced directives, counseling/discussion     Essential hypertension     Hyperlipidemia, unspecified     Scoliosis      Osteopenia     Postherpetic neuralgia     Other chronic pain     COPD (chronic obstructive pulmonary disease) (H)     Paroxysmal atrial fibrillation (H)     Anxiety     Hypokalemia     Anticoagulant long-term use     Heart failure with preserved left ventricular function (HFpEF) (H)     Past Surgical History:   Procedure Laterality Date     C FULL ROUT OBSTE CARE, DELIV  1964     C FULL ROUT OBSTE CARE, DELIV       C FULL ROUT OBSTE CARE, DELIV       C TOTAL KNEE ARTHROPLASTY  1997    left     C TOTAL KNEE ARTHROPLASTY  2005    right     C TREAT ECTOPIC PREG,RMV TUBE/OVARY  1967    left     COLONOSCOPY  11    [facility] REPAIR OF NASAL SEPTUM         Social History   Substance Use Topics     Smoking status: Former Smoker     Quit date: 1979     Smokeless tobacco: Never Used      Comment: no smokers in household     Alcohol use No      Comment: none since      Family History   Problem Relation Age of Onset     Substance Abuse Father      Cancer Daughter      Depression Daughter      Hypertension Mother            ROS:  CONSTITUTIONAL: NEGATIVE for fever, chills, change in weight  ENT/MOUTH: NEGATIVE for ear, mouth and throat problems  RESP: NEGATIVE for significant cough or shortness of breath   CV: NEGATIVE for chest pain, palpitations or peripheral edema    OBJECTIVE:     /76  Pulse 55  Temp 97.2  F (36.2  C) (Temporal)  Wt 110 lb (49.9 kg)  LMP 2011  SpO2 98%  Breastfeeding? No  BMI 23.6 kg/m2  Body mass index is 23.6 kg/(m^2).  Gen: no apparent distress  NECK: no adenopathy, no asymmetry, no masses  Chest: clear to auscultation without wheeze, rale or rhonchi  Cor: regular rate and rhythm without murmur  ABDOMEN: soft, nontender, no masses and bowel sounds normal  Ext: warm and dry with 1+ bilateral edema  Psych: Alert and oriented times 3; coherent speech, normal   rate and volume, able to articulate logical  thoughts, able   to abstract reason, no tangential thoughts, no hallucinations   or delusions  Her affect is neutral    ASSESSMENT/PLAN:     1. Paroxysmal atrial fibrillation (H)  She is back in normal sinus rhythm.  She is now on amiodarone.  She continues on Xarelto for anticoagulation.  She is supposed to be having a vocal cord procedure next week.  I have asked them to contact cardiology to see if they feel it is reasonable for her to go ahead with this or if she should postpone this procedure.    - Basic metabolic panel  - CBC with platelets    2. Other chronic pain  We will continue the MS Contin 15 mg twice daily and her Percocet 3 times daily.  Will give her 1 month of scripts for these to reflect the change.  Discussed that we will do monthly prescription at this point as her dose may continue to change.    - oxyCODONE-acetaminophen (PERCOCET) 7.5-325 MG per tablet; Take 1 tablet by mouth 3 times daily  Dispense: 90 tablet; Refill: 0  - morphine (MS CONTIN) 15 MG 12 hr tablet; Take 1 tablet (15 mg) by mouth every 12 hours  Dispense: 60 tablet; Refill: 0    3. Heart failure with preserved left ventricular function (HFpEF) (H)  This is a new diagnosis for her.  She is now on spironolactone.  Will recheck potassium today as she is no longer taking her potassium supplement.  She is also on Lasix.  She feels her edema is controlled.  Her blood pressure could be improved, however she states she has not taken any of her medications yet today.    We will have her follow-up with me in 1 month or if cardiology feels she can go ahead with the procedure I would then see her next week for preop      Silvia Matthews MD  [facility]

## 2018-07-27 NOTE — MR AVS SNAPSHOT
After Visit Summary   7/27/2018    Katherin Jonas    MRN: [id number]           Patient Information     Date Of Birth          1940        Visit Information        Provider Department      7/27/2018 8:40 AM Silvia Matthews MD [facility]        Today's Diagnoses     Paroxysmal atrial fibrillation (H)    -  1    Other chronic pain        Heart failure with preserved left ventricular function (HFpEF) (H)          Care Instructions    Call Cardiology and ask if it is alright to proceed with your planned vocal cord procedure, or if this should be delayed.      Before Your Surgery      Call your surgeon if there is any change in your health. This includes signs of a cold or flu (such as a sore throat, runny nose, cough, rash or fever).    Do not smoke, drink alcohol or take over the counter medicine (unless your surgeon or primary care doctor tells you to) for the 24 hours before and after surgery.    If you take prescribed drugs: Follow your doctor s orders about which medicines to take and which to stop until after surgery.    Eating and drinking prior to surgery: follow the instructions from your surgeon    Take a shower or bath the night before surgery. Use the soap your surgeon gave you to gently clean your skin. If you do not have soap from your surgeon, use your regular soap. Do not shave or scrub the surgery site.  Wear clean pajamas and have clean sheets on your bed.           Follow-ups after your visit        Your next 10 appointments already scheduled     Aug 01, 2018 10:20 AM CDT   [facility]    [address] [place] [id number] [phone]              Who to contact     If you have questions or need follow up information about today's clinic visit or your schedule please contact [facility] directly at [phone].  Normal or non-critical  lab and imaging results will be communicated to you by MyChart, letter or phone within 4 business days after the clinic has received the results. If you do not hear from us within 7 days, please contact the clinic through MyChart or phone. If you have a critical or abnormal lab result, we will notify you by phone as soon as possible.  Submit refill requests through [employer] or call your pharmacy and they will forward the refill request to us. Please allow 3 business days for your refill to be completed.          Additional Information About Your Visit        Care EveryWhere ID     This is your Care EveryWhere ID. This could be used by other organizations to access your [place] medical records  [id number]        Your Vitals Were     Pulse Temperature Last Period Pulse Oximetry Breastfeeding? BMI (Body Mass Index)    55 97.2  F (36.2  C) (Temporal) 02/01/2011 98% No 23.6 kg/m2       Blood Pressure from Last 3 Encounters:   07/27/18 144/76   07/17/18 124/64   07/03/18 104/64    Weight from Last 3 Encounters:   07/27/18 110 lb (49.9 kg)   07/03/18 114 lb (51.7 kg)   05/15/18 111 lb (50.3 kg)              We Performed the Following     Basic metabolic panel     CBC with platelets          Today's Medication Changes          These changes are accurate as of 7/27/18  9:26 AM.  If you have any questions, ask your nurse or doctor.               These medicines have changed or have updated prescriptions.        Dose/Directions    morphine 15 MG 12 hr tablet   Commonly known as:  MS CONTIN   This may have changed:  when to take this   Used for:  Other chronic pain   Changed by:  Silvia Matthews MD        Dose:  15 mg   Take 1 tablet (15 mg) by mouth every 12 hours   Quantity:  60 tablet   Refills:  0       oxyCODONE-acetaminophen 7.5-325 MG per tablet   Commonly known as:  PERCOCET   This may have changed:  when to take this   Used for:  Other chronic pain   Changed by:  Silvia Matthews MD        Dose:  1  tablet   Take 1 tablet by mouth 3 times daily   Quantity:  90 tablet   Refills:  0            Where to get your medicines      Some of these will need a paper prescription and others can be bought over the counter.  Ask your nurse if you have questions.     Bring a paper prescription for each of these medications     morphine 15 MG 12 hr tablet    oxyCODONE-acetaminophen 7.5-325 MG per tablet               Information about OPIOIDS     PRESCRIPTION OPIOIDS: WHAT YOU NEED TO KNOW   We gave you an opioid (narcotic) pain medicine. It is important to manage your pain, but opioids are not always the best choice. You should first try all the other options your care team gave you. Take this medicine for as short a time (and as few doses) as possible.     These medicines have risks:    DO NOT drive when on new or higher doses of pain medicine. These medicines can affect your alertness and reaction times, and you could be arrested for driving under the influence (DUI). If you need to use opioids long-term, talk to your care team about driving.    DO NOT operate heave machinery    DO NOT do any other dangerous activities while taking these medicines.     DO NOT drink any alcohol while taking these medicines.      If the opioid prescribed includes acetaminophen, DO NOT take with any other medicines that contain acetaminophen. Read all labels carefully. Look for the word  acetaminophen  or  Tylenol.  Ask your pharmacist if you have questions or are unsure.    You can get addicted to pain medicines, especially if you have a history of addiction (chemical, alcohol or substance dependence). Talk to your care team about ways to reduce this risk.    Store your pills in a secure place, locked if possible. We will not replace any lost or stolen medicine. If you don t finish your medicine, please throw away (dispose) as directed by your pharmacist. The [place] Pollution Control Agency has more information about safe disposal:  https://www.pca.[facility].[place].us/living-green/managing-unwanted-medications.     All opioids tend to cause constipation. Drink plenty of water and eat foods that have a lot of fiber, such as fruits, vegetables, prune juice, apple juice and high-fiber cereal. Take a laxative (Miralax, milk of magnesia, Colace, Senna) if you don t move your bowels at least every other day.          Primary Care Provider Office Phone # Fax #    Silvia Matthews [phone]       [address] [place] [address] [address]  [facility] [address]        Equal Access to Services     [facility]: Hadii javier Ireland, waaxda aniket, qaybta kaalmada mehran, martínez ramirez . So [facility] [phone].    ATENCIÓN: Si habla español, tiene a bernardo disposición servicios gratuitos de asistencia lingüística. Llame al [phone].    We comply with applicable federal civil rights laws and [place] laws. We do not discriminate on the basis of race, color, national origin, age, disability, sex, sexual orientation, or gender identity.            Thank you!     Thank you for choosing [facility] [place]  for your care. Our goal is always to provide you with excellent care. Hearing back from our patients is one way we can continue to improve our services. Please take a few minutes to complete the written survey that you may receive in the mail after your visit with us. Thank you!             Your Updated Medication List - Protect others around you: Learn how to safely use, store and throw away your medicines at www.disposemymeds.org.          This list is accurate as of 7/27/18  9:26 AM.  Always use your most recent med list.                   Brand Name Dispense Instructions for use Diagnosis    ACE/ARB/ARNI NOT PRESCRIBED (INTENTIONAL)      continuous prn. ACE & ARB not prescribed due to Other:BP controlled, no microalbuminuria        amiodarone 200 MG tablet    PACERONE/CODARONE     Take 2 tablets (400 mg) by  mouth 2 times daily        amLODIPine 5 MG tablet    NORVASC    90 tablet    Take 0.5 tablets (2.5 mg) by mouth daily    Essential hypertension       ASPIRIN NOT PRESCRIBED    INTENTIONAL    0 each    Please choose reason not prescribed, below    Essential hypertension with goal blood pressure less than 140/90       atorvastatin 10 MG tablet    LIPITOR    90 tablet    Take 1 tablet (10 mg) by mouth daily    Hyperlipidemia, unspecified       CALCIUM CITRATE + D PO      1,200 mg daily        eszopiclone 3 MG tablet    LUNESTA    90 tablet    Take 1 tablet (3 mg) by mouth nightly as needed    Insomnia, unspecified type       FLUoxetine 40 MG capsule    PROzac    90 capsule    Take 1 capsule (40 mg) by mouth daily    Mild major depression (H)       furosemide 20 MG tablet    LASIX     Take 40 mg by mouth daily        gabapentin 300 MG capsule    NEURONTIN    360 capsule    TAKE ONE CAPSULE BY MOUTH FOUR TIMES A DAY    Postherpetic neuralgia       lidocaine 5 % Patch    LIDODERM    30 patch    Apply up to 3 patches to painful area at once for up to 12 h within a 24 h period.  Remove after 12 hours.    Other chronic pain, Postherpetic neuralgia       Lutein 6 MG Tabs      Take  by mouth daily.        metoprolol succinate 100 MG 24 hr tablet    TOPROL-XL    90 tablet    Take 150 mg by mouth 2 times daily    Essential hypertension with goal blood pressure less than 140/90       morphine 15 MG 12 hr tablet    MS CONTIN    60 tablet    Take 1 tablet (15 mg) by mouth every 12 hours    Other chronic pain       MULTI-VITAMIN PO      once daily        naloxone auto-injector    EVZIO    0.8 mL    Inject 0.4 mLs (0.4 mg) into the muscle as needed for opioid reversal every 2-3 minutes until assistance arrives    Chronic, continuous use of opioids       * nystatin 399182 UNIT/GM Powd    NYSTOP    120 g    APPLY 1 DOSE TOPICALLY THREE TIMES DAILY AS NEEDED    Intertrigo       * nystatin cream    MYCOSTATIN    60 g    APPLY TOPICALLY  DAILY AS NEEDED(RASH UNDER BREAST AND IN GROIN)    Intertrigo       ondansetron 4 MG ODT tab    ZOFRAN-ODT     Take 4 mg by mouth every 8 hours as needed for nausea        oxyCODONE-acetaminophen 7.5-325 MG per tablet    PERCOCET    90 tablet    Take 1 tablet by mouth 3 times daily    Other chronic pain       ranitidine 150 MG tablet    ZANTAC     Take 150 mg by mouth daily        SENNA-GEN 8.6 MG tablet   Generic drug:  senna     120    2 TABLETS AT Twice daily    Unspecified constipation       SPIRONOLACTONE PO      Take 25 mg by mouth daily        triamcinolone 0.1 % cream    KENALOG    15 g    APPLY SPARINGLY EXTERNALLY TO THE AFFECTED AREA THREE TIMES DAILY FOR 14 DAYS    Dermatitis       XARELTO 15 MG Tabs tablet   Generic drug:  rivaroxaban ANTICOAGULANT      Take 15 mg by mouth daily        * Notice:  This list has 2 medication(s) that are the same as other medications prescribed for you. Read the directions carefully, and ask your doctor or other care provider to review them with you.

## 2018-08-02 ENCOUNTER — TRANSFERRED RECORDS (OUTPATIENT)
Dept: HEALTH INFORMATION MANAGEMENT | Facility: CLINIC | Age: 78
End: 2018-08-02

## 2018-08-04 LAB
CREAT SERPL-MCNC: 0.65 MG/DL (ref 0.57–1.11)
GFR SERPL CREATININE-BSD FRML MDRD: >60 ML/MIN/1.73M2
GLUCOSE SERPL-MCNC: 93 MG/DL (ref 65–100)
POTASSIUM SERPL-SCNC: 3.3 MMOL/L (ref 3.5–5)

## 2018-08-07 ENCOUNTER — OFFICE VISIT (OUTPATIENT)
Dept: FAMILY MEDICINE | Facility: OTHER | Age: 78
End: 2018-08-07
Payer: COMMERCIAL

## 2018-08-07 VITALS
OXYGEN SATURATION: 96 % | HEART RATE: 47 BPM | TEMPERATURE: 97.8 F | RESPIRATION RATE: 14 BRPM | SYSTOLIC BLOOD PRESSURE: 118 MMHG | WEIGHT: 106 LBS | DIASTOLIC BLOOD PRESSURE: 70 MMHG | BODY MASS INDEX: 22.74 KG/M2

## 2018-08-07 DIAGNOSIS — I50.30 HEART FAILURE WITH PRESERVED LEFT VENTRICULAR FUNCTION (HFPEF) (H): Primary | ICD-10-CM

## 2018-08-07 DIAGNOSIS — G89.29 OTHER CHRONIC PAIN: ICD-10-CM

## 2018-08-07 DIAGNOSIS — I48.0 PAROXYSMAL ATRIAL FIBRILLATION (H): ICD-10-CM

## 2018-08-07 LAB
ANION GAP SERPL CALCULATED.3IONS-SCNC: 10 MMOL/L (ref 3–14)
BUN SERPL-MCNC: 21 MG/DL (ref 7–30)
CALCIUM SERPL-MCNC: 8.4 MG/DL (ref 8.5–10.1)
CHLORIDE SERPL-SCNC: 98 MMOL/L (ref 94–109)
CO2 SERPL-SCNC: 31 MMOL/L (ref 20–32)
CREAT SERPL-MCNC: 0.89 MG/DL (ref 0.52–1.04)
GFR SERPL CREATININE-BSD FRML MDRD: 62 ML/MIN/1.7M2
GLUCOSE SERPL-MCNC: 95 MG/DL (ref 70–99)
POTASSIUM SERPL-SCNC: 3.9 MMOL/L (ref 3.4–5.3)
SODIUM SERPL-SCNC: 139 MMOL/L (ref 133–144)

## 2018-08-07 PROCEDURE — 80048 BASIC METABOLIC PNL TOTAL CA: CPT | Performed by: FAMILY MEDICINE

## 2018-08-07 PROCEDURE — 99214 OFFICE O/P EST MOD 30 MIN: CPT | Performed by: FAMILY MEDICINE

## 2018-08-07 PROCEDURE — 36415 COLL VENOUS BLD VENIPUNCTURE: CPT | Performed by: FAMILY MEDICINE

## 2018-08-07 RX ORDER — POTASSIUM CHLORIDE 20MEQ/15ML
10 LIQUID (ML) ORAL DAILY
Qty: 450 ML | Refills: 0 | COMMUNITY
Start: 2018-08-07 | End: 2018-08-07

## 2018-08-07 NOTE — MR AVS SNAPSHOT
After Visit Summary   8/7/2018    Katherin Jonas    MRN: [id number]           Patient Information     Date Of Birth          1940        Visit Information        Provider Department      8/7/2018 11:00 AM Silvia Matthews MD [facility]        Today's Diagnoses     Heart failure with preserved left ventricular function (HFpEF) (H)    -  1    Paroxysmal atrial fibrillation (H)           Follow-ups after your visit        Your next 10 appointments already scheduled     Aug 14, 2018  3:20 PM CDT   [facility] with [facility]ws [facility]   [facility]    [address]  [facility] [address]   [phone]            Aug 24, 2018  2:45 PM CDT   Neuro Eval with Gretchen Ryan, LEX   [facility]    [address] [facility] [id number] [phone]              Who to contact     If you have questions or need follow up information about today's clinic visit or your schedule please contact [facility] directly at [phone].  Normal or non-critical lab and imaging results will be communicated to you by MyChart, letter or phone within 4 business days after the clinic has received the results. If you do not hear from us within 7 days, please contact the clinic through MyChart or phone. If you have a critical or abnormal lab result, we will notify you by phone as soon as possible.  Submit refill requests through [employer] or call your pharmacy and they will forward the refill request to us. Please allow 3 business days for your refill to be completed.          Additional Information About Your Visit        Care EveryWhere ID     This is your Care EveryWhere ID. This could be used by other organizations to access your [place] medical records  [id number]        Your Vitals Were     Pulse Temperature Respirations Last Period Pulse Oximetry BMI (Body  Mass Index)    47 97.8  F (36.6  C) (Temporal) 14 02/01/2011 96% 22.74 kg/m2       Blood Pressure from Last 3 Encounters:   08/07/18 118/70   07/27/18 144/76   07/17/18 124/64    Weight from Last 3 Encounters:   08/07/18 106 lb (48.1 kg)   07/27/18 110 lb (49.9 kg)   07/03/18 114 lb (51.7 kg)              We Performed the Following     Basic metabolic panel        Primary Care Provider Office Phone # Fax #    Silvia KING MD [place] [phone]       [address] [facility] [address]  [facility] [address]        Equal Access to Services     SURINDER HUERTA : Gopi Ireland, isrrael marcial, alda kaalmada mehran, martínez ramirez . So [facility] [phone].    ATENCIÓN: Si habla español, tiene a bernardo disposición servicios gratuitos de asistencia lingüística. Llame al [phone].    We comply with applicable federal civil rights laws and [place] laws. We do not discriminate on the basis of race, color, national origin, age, disability, sex, sexual orientation, or gender identity.            Thank you!     Thank you for choosing [facility]  for your care. Our goal is always to provide you with excellent care. Hearing back from our patients is one way we can continue to improve our services. Please take a few minutes to complete the written survey that you may receive in the mail after your visit with us. Thank you!             Your Updated Medication List - Protect others around you: Learn how to safely use, store and throw away your medicines at www.disposemymeds.org.          This list is accurate as of 8/7/18 11:46 AM.  Always use your most recent med list.                   Brand Name Dispense Instructions for use Diagnosis    ACE/ARB/ARNI NOT PRESCRIBED (INTENTIONAL)      continuous prn. ACE & ARB not prescribed due to Other:BP controlled, no microalbuminuria        amiodarone 200 MG tablet    PACERONE/CODARONE     Take 400 mg by mouth daily         amLODIPine 5 MG tablet    NORVASC    90 tablet    Take 0.5 tablets (2.5 mg) by mouth daily    Essential hypertension       ASPIRIN NOT PRESCRIBED    INTENTIONAL    0 each    Please choose reason not prescribed, below    Essential hypertension with goal blood pressure less than 140/90       atorvastatin 10 MG tablet    LIPITOR    90 tablet    Take 1 tablet (10 mg) by mouth daily    Hyperlipidemia, unspecified       CALCIUM CITRATE + D PO      1,200 mg daily        eszopiclone 3 MG tablet    LUNESTA    90 tablet    Take 1 tablet (3 mg) by mouth nightly as needed    Insomnia, unspecified type       FLUoxetine 40 MG capsule    PROzac    90 capsule    Take 1 capsule (40 mg) by mouth daily    Mild major depression (H)       furosemide 20 MG tablet    LASIX     Take 40 mg by mouth daily        gabapentin 300 MG capsule    NEURONTIN    360 capsule    TAKE ONE CAPSULE BY MOUTH FOUR TIMES A DAY    Postherpetic neuralgia       lidocaine 5 % Patch    LIDODERM    30 patch    Apply up to 3 patches to painful area at once for up to 12 h within a 24 h period.  Remove after 12 hours.    Other chronic pain, Postherpetic neuralgia       Lutein 6 MG Tabs      Take  by mouth daily.        metoprolol succinate 100 MG 24 hr tablet    TOPROL-XL    90 tablet    Take 50 mg by mouth daily    Essential hypertension with goal blood pressure less than 140/90       morphine 15 MG 12 hr tablet    MS CONTIN    60 tablet    Take 1 tablet (15 mg) by mouth every 12 hours    Other chronic pain       MULTI-VITAMIN PO      once daily        naloxone auto-injector    EVZIO    0.8 mL    Inject 0.4 mLs (0.4 mg) into the muscle as needed for opioid reversal every 2-3 minutes until assistance arrives    Chronic, continuous use of opioids       * nystatin [id number] UNIT/GM Powd    NYSTOP    120 g    APPLY 1 DOSE TOPICALLY THREE TIMES DAILY AS NEEDED    Intertrigo       * nystatin cream    MYCOSTATIN    60 g    APPLY TOPICALLY DAILY AS NEEDED(RASH UNDER BREAST AND  IN GROIN)    Intertrigo       ondansetron 4 MG ODT tab    ZOFRAN-ODT     Take 4 mg by mouth every 8 hours as needed for nausea        oxyCODONE-acetaminophen 7.5-325 MG per tablet    PERCOCET    90 tablet    Take 1 tablet by mouth 3 times daily    Other chronic pain       ranitidine 150 MG tablet    ZANTAC     Take 150 mg by mouth daily        SENNA-GEN 8.6 MG tablet   Generic drug:  senna     120    2 TABLETS AT Twice daily    Unspecified constipation       SPIRONOLACTONE PO      Take 25 mg by mouth daily        triamcinolone 0.1 % cream    KENALOG    15 g    APPLY SPARINGLY EXTERNALLY TO THE AFFECTED AREA THREE TIMES DAILY FOR 14 DAYS    Dermatitis       XARELTO 15 MG Tabs tablet   Generic drug:  rivaroxaban ANTICOAGULANT      Take 15 mg by mouth daily        * Notice:  This list has 2 medication(s) that are the same as other medications prescribed for you. Read the directions carefully, and ask your doctor or other care provider to review them with you.
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
## 2018-08-10 ENCOUNTER — FCC EXTENDED DOCUMENTATION (OUTPATIENT)
Dept: PSYCHOLOGY | Facility: CLINIC | Age: 78
End: 2018-08-10

## 2018-08-10 DIAGNOSIS — F41.1 GAD (GENERALIZED ANXIETY DISORDER): Primary | ICD-10-CM

## 2018-08-10 NOTE — PROGRESS NOTES
"                      Discharge Summary  Multiple Sessions    Client Name: Kathrein Jonas MRN#: [id number] YOB: 1940      Intake / Discharge Date: 18; Discharge date: 8/10/18      DSM5 Diagnoses: (Sustained by DSM5 Criteria Listed Above)  Diagnoses: 300.02 (F41.1) Generalized Anxiety Disorder  Psychosocial & Contextual Factors: Client has had a number of health issues in the past few months (since February) and now experiences anxiety about dying or having to face additional medical issues.  WHODAS 2.0 (12 item) Score: 24          Presenting Concern:  Client reported the reason for seeking therapy was anxiety and depression. Client reported that she fears going to bed at night because she worries she won't wake up in the morning. She stated, \"I want to feel like myself. I think I'd be a little sarcastic and cracking more jokes. I guess I'd be going to scrabble meetings and getting together with friends and going to movies more and worrying less. I would be sleeping in my own bed again.\" Client reported she is sleeping better with with the help of a sleeping pill but stated, \"I'm still afraid to sleep in our bed. I worry I might die in my sleep in the bed. I've been sleeping in a different bed since February. I know it's silly, but I keep thinking about my mom who  in . Every morning when I wake up I'm thinking that my mom is still living with us.\" Client's  stated, \"She's definitely been having hallucinations but the doctor said that's not uncommon because she's taking narcotics. She has also lost a lot of weight which influences the impact of the narcotics.\"      Reason for Discharge:  Client did not return      Disposition at Time of Last Encounter:   Comments:   Client was unable to speak at the time of our last session, so it ended short. She explained that she was struggling with a number of medical issues.     Risk Management:   Client denies a history of suicidal ideation, " suicide attempts, self-injurious behavior, homicidal ideation, homicidal behavior and and other safety concerns  A safety and risk management plan has not been developed at this time, however client was given the after-hours number / 911 should there be a change in any of these risk factors.      Referred To:  PCP for ongoing medical care.        Tori Gil, PhD, LP   8/10/2018

## 2018-08-14 ENCOUNTER — OFFICE VISIT (OUTPATIENT)
Dept: FAMILY MEDICINE | Facility: OTHER | Age: 78
End: 2018-08-14
Payer: COMMERCIAL

## 2018-08-14 VITALS
WEIGHT: 105 LBS | DIASTOLIC BLOOD PRESSURE: 58 MMHG | OXYGEN SATURATION: 97 % | SYSTOLIC BLOOD PRESSURE: 122 MMHG | BODY MASS INDEX: 22.52 KG/M2 | TEMPERATURE: 98.1 F | HEART RATE: 46 BPM | RESPIRATION RATE: 14 BRPM

## 2018-08-14 DIAGNOSIS — Z01.818 PREOP GENERAL PHYSICAL EXAM: Primary | ICD-10-CM

## 2018-08-14 DIAGNOSIS — I50.30 HEART FAILURE WITH PRESERVED LEFT VENTRICULAR FUNCTION (HFPEF) (H): ICD-10-CM

## 2018-08-14 DIAGNOSIS — J38.3 VOCAL CORD DYSFUNCTION: ICD-10-CM

## 2018-08-14 DIAGNOSIS — I48.0 PAROXYSMAL ATRIAL FIBRILLATION (H): ICD-10-CM

## 2018-08-14 DIAGNOSIS — I10 ESSENTIAL HYPERTENSION WITH GOAL BLOOD PRESSURE LESS THAN 140/90: ICD-10-CM

## 2018-08-14 LAB
ERYTHROCYTE [DISTWIDTH] IN BLOOD BY AUTOMATED COUNT: 14.8 % (ref 10–15)
HCT VFR BLD AUTO: 37.3 % (ref 35–47)
HGB BLD-MCNC: 12.2 G/DL (ref 11.7–15.7)
MCH RBC QN AUTO: 30.9 PG (ref 26.5–33)
MCHC RBC AUTO-ENTMCNC: 32.7 G/DL (ref 31.5–36.5)
MCV RBC AUTO: 94 FL (ref 78–100)
PLATELET # BLD AUTO: 161 10E9/L (ref 150–450)
RBC # BLD AUTO: 3.95 10E12/L (ref 3.8–5.2)
WBC # BLD AUTO: 4 10E9/L (ref 4–11)

## 2018-08-14 PROCEDURE — 36415 COLL VENOUS BLD VENIPUNCTURE: CPT | Performed by: FAMILY MEDICINE

## 2018-08-14 PROCEDURE — 99215 OFFICE O/P EST HI 40 MIN: CPT | Performed by: FAMILY MEDICINE

## 2018-08-14 PROCEDURE — 85027 COMPLETE CBC AUTOMATED: CPT | Performed by: FAMILY MEDICINE

## 2018-08-14 RX ORDER — METOPROLOL SUCCINATE 25 MG/1
25 TABLET, EXTENDED RELEASE ORAL DAILY
Qty: 30 TABLET | Refills: 0 | Status: SHIPPED | OUTPATIENT
Start: 2018-08-14 | End: 2018-10-12

## 2018-08-14 NOTE — MR AVS SNAPSHOT
After Visit Summary   8/14/2018    Katherin Jonas    MRN: [id number]           Patient Information     Date Of Birth          1940        Visit Information        Provider Department      8/14/2018 3:20 PM Silvia Matthews MD [facility]        Today's Diagnoses     Preop general physical exam    -  1    Vocal cord dysfunction        Heart failure with preserved left ventricular function (HFpEF) (H)        Paroxysmal atrial fibrillation (H)        Essential hypertension with goal blood pressure less than 140/90          Care Instructions      Before Your Surgery      Call your surgeon if there is any change in your health. This includes signs of a cold or flu (such as a sore throat, runny nose, cough, rash or fever).    Do not smoke, drink alcohol or take over the counter medicine (unless your surgeon or primary care doctor tells you to) for the 24 hours before and after surgery.    If you take prescribed drugs: Follow your doctor s orders about which medicines to take and which to stop until after surgery.    Eating and drinking prior to surgery: follow the instructions from your surgeon    Take a shower or bath the night before surgery. Use the soap your surgeon gave you to gently clean your skin. If you do not have soap from your surgeon, use your regular soap. Do not shave or scrub the surgery site.  Wear clean pajamas and have clean sheets on your bed.           Follow-ups after your visit        Follow-up notes from your care team     Return in about 10 days (around 8/24/2018).      Your next 10 appointments already scheduled     Aug 24, 2018 11:20 AM CDT   Office Visit with Silvia Matthews MD   [facility] ([facility]    [address] [place] [address]   [phone]           Bring a current list of meds and any records pertaining to this visit. For Physicals, please bring immunization records and any forms  needing to be filled out. Please arrive 10 minutes early to complete paperwork.            Aug 24, 2018  2:45 PM CDT   Neuro Eval with Gretchen Ryan, PT   [facility] ([facility])    [address] [facility] [address] [phone]              Who to contact     If you have questions or need follow up information about today's clinic visit or your schedule please contact [facility] directly at [phone].  Normal or non-critical lab and imaging results will be communicated to you by MyChart, letter or phone within 4 business days after the clinic has received the results. If you do not hear from us within 7 days, please contact the clinic through MyChart or phone. If you have a critical or abnormal lab result, we will notify you by phone as soon as possible.  Submit refill requests through [email or web address] or call your pharmacy and they will forward the refill request to us. Please allow 3 business days for your refill to be completed.          Additional Information About Your Visit        Care EveryWhere ID     This is your Care EveryWhere ID. This could be used by other organizations to access your [place] medical records  [id number]        Your Vitals Were     Pulse Temperature Respirations Last Period Pulse Oximetry BMI (Body Mass Index)    46 98.1  F (36.7  C) (Temporal) 14 02/01/2011 97% 22.52 kg/m2       Blood Pressure from Last 3 Encounters:   08/14/18 122/58   08/07/18 118/70   07/27/18 144/76    Weight from Last 3 Encounters:   08/14/18 105 lb (47.6 kg)   08/07/18 106 lb (48.1 kg)   07/27/18 110 lb (49.9 kg)              We Performed the Following     Basic metabolic panel     CBC with platelets          Today's Medication Changes          These changes are accurate as of 8/14/18  4:11 PM.  If you have any questions, ask your nurse or doctor.               These medicines have changed or have updated prescriptions.        Dose/Directions     metoprolol succinate 25 MG 24 hr tablet   Commonly known as:  TOPROL-XL   This may have changed:    - medication strength  - how much to take   Used for:  Essential hypertension with goal blood pressure less than 140/90   Changed by:  Silvia Matthews MD        Dose:  25 mg   Take 1 tablet (25 mg) by mouth daily   Quantity:  30 tablet   Refills:  0            Where to get your medicines      These medications were sent to [facility] [address] AT [facility] & [address] , [facility] [address]     Phone:  [phone]     metoprolol succinate 25 MG 24 hr tablet                Primary Care Provider Office Phone # Fax #    Silvia Matthews [phone]       [address] [place] [address]  [facility] [address]        Equal Access to Services     [facility]: Hadii javier esposito hadasho Soomaali, waaxda luqadaha, qaybta kaalmada adeegyada, martínez ramirez . So [facility] [phone].    ATENCIÓN: Si habla español, tiene a bernardo disposición servicios gratuitos de asistencia lingüística. [facility] [phone].    We comply with applicable federal civil rights laws and [place] laws. We do not discriminate on the basis of race, color, national origin, age, disability, sex, sexual orientation, or gender identity.            Thank you!     Thank you for choosing [facility]  for your care. Our goal is always to provide you with excellent care. Hearing back from our patients is one way we can continue to improve our services. Please take a few minutes to complete the written survey that you may receive in the mail after your visit with us. Thank you!             Your Updated Medication List - Protect others around you: Learn how to safely use, store and throw away your medicines at www.disposemymeds.org.          This list is accurate as of 8/14/18  4:11 PM.  Always use your most recent med list.                    Brand Name Dispense Instructions for use Diagnosis    ACE/ARB/ARNI NOT PRESCRIBED (INTENTIONAL)      continuous prn. ACE & ARB not prescribed due to Other:BP controlled, no microalbuminuria        amiodarone 200 MG tablet    PACERONE/CODARONE     Take 400 mg by mouth daily        amLODIPine 5 MG tablet    NORVASC    90 tablet    Take 0.5 tablets (2.5 mg) by mouth daily    Essential hypertension       ASPIRIN NOT PRESCRIBED    INTENTIONAL    0 each    Please choose reason not prescribed, below    Essential hypertension with goal blood pressure less than 140/90       atorvastatin 10 MG tablet    LIPITOR    90 tablet    Take 1 tablet (10 mg) by mouth daily    Hyperlipidemia, unspecified       CALCIUM CITRATE + D PO      1,200 mg daily        eszopiclone 3 MG tablet    LUNESTA    90 tablet    Take 1 tablet (3 mg) by mouth nightly as needed    Insomnia, unspecified type       FLUoxetine 40 MG capsule    PROzac    90 capsule    Take 1 capsule (40 mg) by mouth daily    Mild major depression (H)       furosemide 20 MG tablet    LASIX     Take 40 mg by mouth daily        gabapentin 300 MG capsule    NEURONTIN    360 capsule    TAKE ONE CAPSULE BY MOUTH FOUR TIMES A DAY    Postherpetic neuralgia       lidocaine 5 % Patch    LIDODERM    30 patch    Apply up to 3 patches to painful area at once for up to 12 h within a 24 h period.  Remove after 12 hours.    Other chronic pain, Postherpetic neuralgia       Lutein 6 MG Tabs      Take  by mouth daily.        metoprolol succinate 25 MG 24 hr tablet    TOPROL-XL    30 tablet    Take 1 tablet (25 mg) by mouth daily    Essential hypertension with goal blood pressure less than 140/90       morphine 15 MG 12 hr tablet    MS CONTIN    60 tablet    Take 1 tablet (15 mg) by mouth every 12 hours    Other chronic pain       MULTI-VITAMIN PO      once daily        naloxone auto-injector    EVZIO    0.8 mL    Inject 0.4 mLs (0.4 mg) into the muscle as needed for opioid reversal every 2-3  minutes until assistance arrives    Chronic, continuous use of opioids       * nystatin 635039 UNIT/GM Powd    NYSTOP    120 g    APPLY 1 DOSE TOPICALLY THREE TIMES DAILY AS NEEDED    Intertrigo       * nystatin cream    MYCOSTATIN    60 g    APPLY TOPICALLY DAILY AS NEEDED(RASH UNDER BREAST AND IN GROIN)    Intertrigo       ondansetron 4 MG ODT tab    ZOFRAN-ODT     Take 4 mg by mouth every 8 hours as needed for nausea        oxyCODONE-acetaminophen 7.5-325 MG per tablet    PERCOCET    90 tablet    Take 1 tablet by mouth 3 times daily    Other chronic pain       ranitidine 150 MG tablet    ZANTAC     Take 150 mg by mouth daily        SENNA-GEN 8.6 MG tablet   Generic drug:  senna     120    2 TABLETS AT Twice daily    Unspecified constipation       SPIRONOLACTONE PO      Take 25 mg by mouth daily        triamcinolone 0.1 % cream    KENALOG    15 g    APPLY SPARINGLY EXTERNALLY TO THE AFFECTED AREA THREE TIMES DAILY FOR 14 DAYS    Dermatitis       XARELTO 15 MG Tabs tablet   Generic drug:  rivaroxaban ANTICOAGULANT      Take 15 mg by mouth daily        * Notice:  This list has 2 medication(s) that are the same as other medications prescribed for you. Read the directions carefully, and ask your doctor or other care provider to review them with you.

## 2018-08-15 ENCOUNTER — TELEPHONE (OUTPATIENT)
Dept: FAMILY MEDICINE | Facility: OTHER | Age: 78
End: 2018-08-15

## 2018-08-15 ENCOUNTER — TELEPHONE (OUTPATIENT)
Dept: LAB | Facility: OTHER | Age: 78
End: 2018-08-15

## 2018-08-15 DIAGNOSIS — I10 ESSENTIAL HYPERTENSION WITH GOAL BLOOD PRESSURE LESS THAN 140/90: ICD-10-CM

## 2018-08-15 DIAGNOSIS — I50.30 HEART FAILURE WITH PRESERVED LEFT VENTRICULAR FUNCTION (HFPEF) (H): ICD-10-CM

## 2018-08-15 LAB
ANION GAP SERPL CALCULATED.3IONS-SCNC: 4 MMOL/L (ref 3–14)
BUN SERPL-MCNC: 18 MG/DL (ref 7–30)
CALCIUM SERPL-MCNC: 8.8 MG/DL (ref 8.5–10.1)
CHLORIDE SERPL-SCNC: 99 MMOL/L (ref 94–109)
CO2 SERPL-SCNC: 34 MMOL/L (ref 20–32)
CREAT SERPL-MCNC: 0.9 MG/DL (ref 0.52–1.04)
GFR SERPL CREATININE-BSD FRML MDRD: 61 ML/MIN/1.7M2
GLUCOSE SERPL-MCNC: 119 MG/DL (ref 70–99)
POTASSIUM SERPL-SCNC: 4.1 MMOL/L (ref 3.4–5.3)
SODIUM SERPL-SCNC: 137 MMOL/L (ref 133–144)

## 2018-08-15 PROCEDURE — 80048 BASIC METABOLIC PNL TOTAL CA: CPT | Performed by: FAMILY MEDICINE

## 2018-08-15 PROCEDURE — 36415 COLL VENOUS BLD VENIPUNCTURE: CPT | Performed by: FAMILY MEDICINE

## 2018-08-15 NOTE — TELEPHONE ENCOUNTER
Attempted to call patient, phone was picked up but no one answered. Will try again later. Patient does have an upcoming appointment with TC on 8/24, labs can be redrawn at that time     Kirsten Jaffe CMA

## 2018-08-15 NOTE — TELEPHONE ENCOUNTER
Informed spouse (C2C) on file of lab results. Will have this redrawn before her procedure on Friday.     Charleen Bonner MA

## 2018-08-15 NOTE — TELEPHONE ENCOUNTER
Katherin needs to return for lab to be drawn, a test from 8/14/18 wasn't acceptable for testing. Test is entered back in Epic as future.    Thanks   Ruby

## 2018-08-15 NOTE — TELEPHONE ENCOUNTER
----- Message from Silvia Matthews MD sent at 8/15/2018 12:55 PM CDT -----  CBC is normal.  Apparently her BMP was not acceptable for testing.  She needs to have this redrawn either today or tomorrow as she needs to have it done before her procedure on Friday.  [facility] if she wishes.  She does not need to be fasting.    Electronically signed by Silvia Matthews MD on 8/15/2018

## 2018-08-15 NOTE — PROGRESS NOTES
SUBJECTIVE:   Katherni Jonas is a 77 year old female who presents to clinic today for the following health issues:      HPI     Patient had vocal cord injection last week and would like to follow up from this. She would also like to review some of her medications.     Her voice is much improved after her injection.  She states she tolerated it very well.    She denies chest pain or shortness of breath.  Her weight continues to decrease which is concerning for her .  She states she has been eating well.  She has tried nutritional supplements in the past and has not liked them.    Problem list and histories reviewed & adjusted, as indicated.  Additional history: as documented    Patient Active Problem List   Diagnosis     Esophageal reflux     Carotid atherosclerosis     CKD (chronic kidney disease) stage 3, GFR 30-59 ml/min     Insomnia     Mild major depression (H)     Advanced directives, counseling/discussion     Essential hypertension     Hyperlipidemia, unspecified     Scoliosis     Osteopenia     Postherpetic neuralgia     Other chronic pain     COPD (chronic obstructive pulmonary disease) (H)     Paroxysmal atrial fibrillation (H)     Anxiety     Hypokalemia     Anticoagulant long-term use     Heart failure with preserved left ventricular function (HFpEF) (H)     Past Surgical History:   Procedure Laterality Date     C FULL ROUT OBSTE CARE, DELIV  1964     C FULL ROUT OBSTE CARE, DELIV  1969     C FULL ROUT OBSTE CARE, DELIV  1970     C TOTAL KNEE ARTHROPLASTY  1997    left     C TOTAL KNEE ARTHROPLASTY  2005    right     C TREAT ECTOPIC PREG,RMV TUBE/OVARY  1967    left     COLONOSCOPY  11    [facility]     HC REPAIR OF NASAL SEPTUM         Social History   Substance Use Topics     Smoking status: Former Smoker     Quit date: 1979     Smokeless tobacco: Never Used      Comment: no smokers in household     Alcohol use No      Comment:  none since 2006     Family History   Problem Relation Age of Onset     Substance Abuse Father      Cancer Daughter      Depression Daughter      Hypertension Mother            ROS:  CONSTITUTIONAL: NEGATIVE for fever, chills.  She has had some unintentional weight loss.  ENT/MOUTH: NEGATIVE for ear, mouth and throat problems  RESP: NEGATIVE for significant cough or shortness of breath   CV: NEGATIVE for chest pain, palpitations.  Has mild lower extremity edema.    OBJECTIVE:     /52  Pulse 66  Temp 98.3  F (36.8  C) (Temporal)  Resp 14  Wt 101 lb (45.8 kg)  LMP 02/01/2011  SpO2 95%  BMI 21.67 kg/m2  Body mass index is 21.67 kg/(m^2).  Gen: no apparent distress  Chest: clear to auscultation without wheeze, rale or rhonchi  Cor: regular rate and rhythm without murmur  ABDOMEN: soft, nontender, no masses and bowel sounds normal  Ext: warm and dry with 1+ bilateral lower extremity edema  Psych: Alert and oriented times 3; coherent speech, normal   rate and volume, able to articulate logical thoughts, able   to abstract reason, no tangential thoughts, no hallucinations   or delusions  Her affect is bright      ASSESSMENT/PLAN:     1. Paroxysmal atrial fibrillation (H)  She needs refills on her spironolactone and Xarelto.  She has a follow-up with cardiology on September 6.  Cardiology had been filling these medications.  I gave her a couple of weeks to make it to her next appointment.    - spironolactone (ALDACTONE) 25 MG tablet; Take 1 tablet (25 mg) by mouth daily  Dispense: 20 tablet; Refill: 0  - XARELTO 15 MG TABS tablet; Take 1 tablet (15 mg) by mouth daily  Dispense: 20 tablet; Refill: 0    2. Essential hypertension  Well-controlled.  Refills are given.    - amLODIPine (NORVASC) 2.5 MG tablet; Take 1 tablet (2.5 mg) by mouth daily  Dispense: 90 tablet; Refill: 3    She will follow-up with me in about 3 weeks when she is due for pain medication evaluation.    Silvia Matthews MD  [facility] [place]  [place]

## 2018-08-21 ENCOUNTER — TELEPHONE (OUTPATIENT)
Dept: FAMILY MEDICINE | Facility: OTHER | Age: 78
End: 2018-08-21

## 2018-08-21 NOTE — TELEPHONE ENCOUNTER
LM for patient to return phone call to clinic about message below.  Juanita Bacon CMA ([facility])

## 2018-08-21 NOTE — TELEPHONE ENCOUNTER
Reason for Call:  Other call back    Detailed comments: patient is calling she is wondering is to keep taking this amiodarone (PACERONE/CODARONE) 200 MG tablet.  She is totally out right now but she is not sure if she was suppose to get refills or not.  She also will have questions about the dosage if she is to continue taking it.  Please call    Phone Number Patient can be reached at: Cell number on file:    Telephone Information:   Mobile [phone]       Best Time: any      Can we leave a detailed message on this number? YES    Call taken on 8/21/2018 at 12:03 PM by Lashell Tan

## 2018-08-24 ENCOUNTER — OFFICE VISIT (OUTPATIENT)
Dept: FAMILY MEDICINE | Facility: OTHER | Age: 78
End: 2018-08-24
Payer: COMMERCIAL

## 2018-08-24 ENCOUNTER — HOSPITAL ENCOUNTER (OUTPATIENT)
Dept: PHYSICAL THERAPY | Facility: CLINIC | Age: 78
Setting detail: THERAPIES SERIES
End: 2018-08-24
Attending: FAMILY MEDICINE
Payer: MEDICARE

## 2018-08-24 VITALS
DIASTOLIC BLOOD PRESSURE: 52 MMHG | SYSTOLIC BLOOD PRESSURE: 116 MMHG | RESPIRATION RATE: 14 BRPM | OXYGEN SATURATION: 95 % | BODY MASS INDEX: 21.67 KG/M2 | WEIGHT: 101 LBS | TEMPERATURE: 98.3 F | HEART RATE: 66 BPM

## 2018-08-24 DIAGNOSIS — I10 ESSENTIAL HYPERTENSION: ICD-10-CM

## 2018-08-24 DIAGNOSIS — I48.0 PAROXYSMAL ATRIAL FIBRILLATION (H): Primary | ICD-10-CM

## 2018-08-24 PROCEDURE — 97110 THERAPEUTIC EXERCISES: CPT | Mod: GP

## 2018-08-24 PROCEDURE — G8979 MOBILITY GOAL STATUS: HCPCS | Mod: GP,CI

## 2018-08-24 PROCEDURE — G8978 MOBILITY CURRENT STATUS: HCPCS | Mod: GP,CJ

## 2018-08-24 PROCEDURE — 99213 OFFICE O/P EST LOW 20 MIN: CPT | Performed by: FAMILY MEDICINE

## 2018-08-24 PROCEDURE — 40000719 ZZHC STATISTIC PT DEPARTMENT NEURO VISIT

## 2018-08-24 PROCEDURE — 97161 PT EVAL LOW COMPLEX 20 MIN: CPT | Mod: GP

## 2018-08-24 RX ORDER — SPIRONOLACTONE 25 MG/1
25 TABLET ORAL DAILY
Qty: 20 TABLET | Refills: 0 | Status: SHIPPED | OUTPATIENT
Start: 2018-08-24 | End: 2019-06-11 | Stop reason: SINTOL

## 2018-08-24 RX ORDER — AMLODIPINE BESYLATE 2.5 MG/1
2.5 TABLET ORAL DAILY
Qty: 90 TABLET | Refills: 3 | Status: SHIPPED | OUTPATIENT
Start: 2018-08-24 | End: 2019-10-08

## 2018-08-24 RX ORDER — RIVAROXABAN 15 MG/1
15 TABLET, FILM COATED ORAL DAILY
Qty: 20 TABLET | Refills: 0 | Status: SHIPPED | OUTPATIENT
Start: 2018-08-24 | End: 2020-09-08

## 2018-08-24 NOTE — MR AVS SNAPSHOT
After Visit Summary   8/24/2018    Katherin Jonas    MRN: [id number]           Patient Information     Date Of Birth          1940        Visit Information        Provider Department      8/24/2018 11:20 AM Silvia Matthews MD [facility]        Today's Diagnoses     Paroxysmal atrial fibrillation (H)    -  1    Essential hypertension           Follow-ups after your visit        Your next 10 appointments already scheduled     Aug 24, 2018  2:45 PM CDT   Neuro Eval with Gretchen Ryan, PT   [facility] ([facility])    [address] [facility] [address] [phone]              Who to contact     If you have questions or need follow up information about today's clinic visit or your schedule please contact [facility] directly at [phone].  Normal or non-critical lab and imaging results will be communicated to you by MyChart, letter or phone within 4 business days after the clinic has received the results. If you do not hear from us within 7 days, please contact the clinic through MyChart or phone. If you have a critical or abnormal lab result, we will notify you by phone as soon as possible.  Submit refill requests through [employer] or call your pharmacy and they will forward the refill request to us. Please allow 3 business days for your refill to be completed.          Additional Information About Your Visit        Care EveryWhere ID     This is your Care EveryWhere ID. This could be used by other organizations to access your [place] medical records  [id number]        Your Vitals Were     Pulse Temperature Respirations Last Period Pulse Oximetry BMI (Body Mass Index)    66 98.3  F (36.8  C) (Temporal) 14 02/01/2011 95% 21.67 kg/m2       Blood Pressure from Last 3 Encounters:   08/24/18 116/52   08/14/18 122/58   08/07/18 118/70    Weight from Last 3 Encounters:   08/24/18 101 lb (45.8 kg)   08/14/18 105  lb (47.6 kg)   08/07/18 106 lb (48.1 kg)              Today, you had the following     No orders found for display         Today's Medication Changes          These changes are accurate as of 8/24/18 12:08 PM.  If you have any questions, ask your nurse or doctor.               These medicines have changed or have updated prescriptions.        Dose/Directions    amLODIPine 2.5 MG tablet   Commonly known as:  NORVASC   This may have changed:  medication strength   Used for:  Essential hypertension   Changed by:  Silvia Matthews MD        Dose:  2.5 mg   Take 1 tablet (2.5 mg) by mouth daily   Quantity:  90 tablet   Refills:  3            Where to get your medicines      These medications were sent to [facility] [address] [place] [address] [address] AT [facility] & [address] , [facility] [address]     Phone:  [phone]     amLODIPine 2.5 MG tablet    spironolactone 25 MG tablet    XARELTO 15 MG Tabs tablet                Primary Care Provider Office Phone # Fax #    Silvia Matthews [phone]       [address] [place] [address]  [facility] [address]        Equal Access to Services     [facility]: Hadii aad ku hadasho Soomaali, waaxda luqadaha, qaybta kaalmada adeegyada, waxay fiona hayberthan mari ramirez ah. So [facility] [phone].    ATENCIÓN: Si habla español, tiene a bernardo disposición servicios gratuitos de asistencia lingüística. [facility] [phone].    We comply with applicable federal civil rights laws and [place] laws. We do not discriminate on the basis of race, color, national origin, age, disability, sex, sexual orientation, or gender identity.            Thank you!     Thank you for choosing [facility]  for your care. Our goal is always to provide you with excellent care. Hearing back from our patients is one way we can continue to improve our services. Please take a few minutes to complete the written survey that you may  receive in the mail after your visit with us. Thank you!             Your Updated Medication List - Protect others around you: Learn how to safely use, store and throw away your medicines at www.disposemymeds.org.          This list is accurate as of 8/24/18 12:08 PM.  Always use your most recent med list.                   Brand Name Dispense Instructions for use Diagnosis    ACE/ARB/ARNI NOT PRESCRIBED (INTENTIONAL)      continuous prn. ACE & ARB not prescribed due to Other:BP controlled, no microalbuminuria        amiodarone 200 MG tablet    PACERONE/CODARONE     Take 400 mg by mouth daily        amLODIPine 2.5 MG tablet    NORVASC    90 tablet    Take 1 tablet (2.5 mg) by mouth daily    Essential hypertension       ASPIRIN NOT PRESCRIBED    INTENTIONAL    0 each    Please choose reason not prescribed, below    Essential hypertension with goal blood pressure less than 140/90       atorvastatin 10 MG tablet    LIPITOR    90 tablet    Take 1 tablet (10 mg) by mouth daily    Hyperlipidemia, unspecified       CALCIUM CITRATE + D PO      1,200 mg daily        eszopiclone 3 MG tablet    LUNESTA    90 tablet    Take 1 tablet (3 mg) by mouth nightly as needed    Insomnia, unspecified type       FLUoxetine 40 MG capsule    PROzac    90 capsule    Take 1 capsule (40 mg) by mouth daily    Mild major depression (H)       furosemide 20 MG tablet    LASIX     Take 40 mg by mouth daily        gabapentin 300 MG capsule    NEURONTIN    360 capsule    TAKE ONE CAPSULE BY MOUTH FOUR TIMES A DAY    Postherpetic neuralgia       lidocaine 5 % Patch    LIDODERM    30 patch    Apply up to 3 patches to painful area at once for up to 12 h within a 24 h period.  Remove after 12 hours.    Other chronic pain, Postherpetic neuralgia       Lutein 6 MG Tabs      Take  by mouth daily.        metoprolol succinate 25 MG 24 hr tablet    TOPROL-XL    30 tablet    Take 1 tablet (25 mg) by mouth daily    Essential hypertension with goal blood pressure  less than 140/90       morphine 15 MG 12 hr tablet    MS CONTIN    60 tablet    Take 1 tablet (15 mg) by mouth every 12 hours    Other chronic pain       MULTI-VITAMIN PO      once daily        naloxone auto-injector    EVZIO    0.8 mL    Inject 0.4 mLs (0.4 mg) into the muscle as needed for opioid reversal every 2-3 minutes until assistance arrives    Chronic, continuous use of opioids       * nystatin 322353 UNIT/GM Powd    NYSTOP    120 g    APPLY 1 DOSE TOPICALLY THREE TIMES DAILY AS NEEDED    Intertrigo       * nystatin cream    MYCOSTATIN    60 g    APPLY TOPICALLY DAILY AS NEEDED(RASH UNDER BREAST AND IN GROIN)    Intertrigo       ondansetron 4 MG ODT tab    ZOFRAN-ODT     Take 4 mg by mouth every 8 hours as needed for nausea        oxyCODONE-acetaminophen 7.5-325 MG per tablet    PERCOCET    90 tablet    Take 1 tablet by mouth 3 times daily    Other chronic pain       ranitidine 150 MG tablet    ZANTAC     Take 150 mg by mouth daily        SENNA-GEN 8.6 MG tablet   Generic drug:  senna     120    2 TABLETS AT Twice daily    Unspecified constipation       spironolactone 25 MG tablet    ALDACTONE    20 tablet    Take 1 tablet (25 mg) by mouth daily    Paroxysmal atrial fibrillation (H)       triamcinolone 0.1 % cream    KENALOG    15 g    APPLY SPARINGLY EXTERNALLY TO THE AFFECTED AREA THREE TIMES DAILY FOR 14 DAYS    Dermatitis       XARELTO 15 MG Tabs tablet   Generic drug:  rivaroxaban ANTICOAGULANT     20 tablet    Take 1 tablet (15 mg) by mouth daily    Paroxysmal atrial fibrillation (H)       * Notice:  This list has 2 medication(s) that are the same as other medications prescribed for you. Read the directions carefully, and ask your doctor or other care provider to review them with you.

## 2018-08-25 NOTE — PROGRESS NOTES
[facility]        OUTPATIENT PHYSICAL THERAPY FUNCTIONAL EVALUATION  PLAN OF TREATMENT FOR OUTPATIENT REHABILITATION  (COMPLETE FOR INITIAL CLAIMS ONLY)  Patient's Last Name, First Name, M.I.  YOB: 1940  Katherin Jonas     Provider's Name   [facility]   Medical Record No.  [id number]     Start of Care Date:  08/24/18   Onset Date:  07/03/18   Type:     _X__PT   ____OT  ____SLP Medical Diagnosis:        PT Diagnosis:  Gait Instability Visits from SOC:  1                              __________________________________________________________________________________  Plan of Treatment/Functional Goals:  balance training, neuromuscular re-education, gait training, ROM, strengthening, stretching, manual therapy           GOALS  Strength  Pt will demonstrate 5/5 B LE strength in order to progress IND and safety with mobility.   10/19/18    ROM  Pt will demonstrate full hip and knee ROM B in order to assist with improved posture with ambulation.   10/19/18    Ambulation  Pt will ambulate 200' with appropriate upright posture and step length/heel strike in order to demonstrate improved safety and form with ambulation.   10/19/18    Balance  Pt will be able to complete SLS B for 8-10 seconds with no UE support in order to demonstrate improved balance and safety with mobility.   10/19/18                 Therapy Frequency:  1 time/week   Predicted Duration of Therapy Intervention:  8 weeks    Gretchen Ryan PT                                    I CERTIFY THE NEED FOR THESE SERVICES FURNISHED UNDER        THIS PLAN OF TREATMENT AND WHILE UNDER MY CARE     (Physician co-signature of this document indicates review and certification of the therapy plan).                Certification Date From:    8/24/2018  Certification Date To:   10/19/2018    Referring Provider:  Silvia  MD Colt    Initial Assessment  See Epic Evaluation- Start of Care Date: 08/24/18

## 2018-08-28 ENCOUNTER — TRANSFERRED RECORDS (OUTPATIENT)
Dept: HEALTH INFORMATION MANAGEMENT | Facility: CLINIC | Age: 78
End: 2018-08-28

## 2018-08-31 ENCOUNTER — HOSPITAL ENCOUNTER (OUTPATIENT)
Dept: PHYSICAL THERAPY | Facility: CLINIC | Age: 78
Setting detail: THERAPIES SERIES
End: 2018-08-31
Attending: FAMILY MEDICINE
Payer: MEDICARE

## 2018-08-31 PROCEDURE — 97110 THERAPEUTIC EXERCISES: CPT | Mod: GP | Performed by: PHYSICAL THERAPIST

## 2018-08-31 PROCEDURE — 40000719 ZZHC STATISTIC PT DEPARTMENT NEURO VISIT: Performed by: PHYSICAL THERAPIST

## 2018-08-31 NOTE — IP AVS SNAPSHOT
MRN:[id number]                      After Visit Summary   8/31/2018    Katherin Jonas    MRN: [id number]           Visit Information        Provider Department      8/31/2018  2:00 PM Reanna Roberson, PT [facility]        Your next 10 appointments already scheduled     Sep 13, 2018  4:30 PM CDT   Neuro Treatment with Sharon Julito, PT   [facility] ([place])    [address] [facility] [address]   [phone]            Sep 14, 2018  7:20 AM CDT   [facility]    [address]  [facility] [address]   [phone]           Bring a current list of meds and any records pertaining to this visit. For Physicals, please bring immunization records and any forms needing to be filled out. Please arrive 10 minutes early to complete paperwork.            Sep 21, 2018  2:30 PM CDT   Neuro Treatment with Reanna Roberson, PT   [facility] ([place])    [address] [facility] [address]   [phone]            Sep 28, 2018  2:45 PM CDT   Neuro Treatment with Sharon Callhoustono, PT   [facility] ([place])    [address] [facility] [address]   [phone]            Oct 05, 2018  1:00 PM CDT   Neuro Treatment with Sharon Callhoustono, PT   [facility] ([place])    [address] [facility] [address]   [phone]            Oct 12, 2018  1:00 PM CDT   Neuro Treatment with Sharon Callisto, PT   [facility] ([place])    [address] [facility] [address]   [phone]            Oct 19, 2018  1:00 PM CDT   Neuro Treatment with Sharon Callhoustono, PT   [facility] ([place])    [address]  [facility] [address]   [phone]            Oct 26, 2018  1:00 PM CDT   Neuro Treatment with Gretchen Ryan PT   [facility]    [address] [facility] [address]   [phone]                Further instructions from your care team       8/31/18   - Take a rest day if you are feeling sore   - Focus on form and performing your exercise SLOWLY!   - Begin walking outside on your driveway with your walker every way.     Care EveryWhere ID     This is your Care EveryWhere ID. This could be used by other organizations to access your [place] medical records  [id number]        Equal Access to Services     SURINDER HUERTA : Gopi Ireland, isrrael marcial, alda laurent, martínez molina. So [facility] [phone].    ATENCIÓN: Si habla michael, tiene a bernardo disposición servicios gratuitos de asistencia lingüística. Llame al [phone].    We comply with applicable federal civil rights laws and [place] laws. We do not discriminate on the basis of race, color, national origin, age, disability, sex, sexual orientation, or gender identity.

## 2018-08-31 NOTE — DISCHARGE INSTRUCTIONS
8/31/18   - Take a rest day if you are feeling sore   - Focus on form and performing your exercise SLOWLY!   - Begin walking outside on your driveway with your walker every way.

## 2018-09-04 NOTE — PROGRESS NOTES
SUBJECTIVE:   Katherin Jonas is a 77 year old female who presents to clinic today for the following health issues:      HPI  Back Pain Follow Up      Description:   Location of pain:  bilateral  Character of pain: sharp and dull ache  Pain radiation: radiates into the right buttocks, radiates into the left buttocks and radiates below the knee   Since last visit, pain is:  Neck is worse, back is the same  New numbness or weakness in legs, not attributed to pain:  no     Intensity: Currently 3/10    History:   Pain interferes with job: Not applicable  Therapies tried without relief: varies  Therapies tried with relief: opioids and Physical Therapy     Overall patient is feeling well.  She denies chest pain or shortness of breath.  She denies edema.  She recently saw the cardiologist who recommended she watch her sodium.  Her voice is much improved after her injections and she plans to start speech therapy.  She is currently undergoing physical therapy for her balance.  She states her back doctor has told her that her neck is going to become more curved because of her kyphoscoliosis.  She does admit to some neck discomfort.    Problem list and histories reviewed & adjusted, as indicated.  Additional history: as documented    Patient Active Problem List   Diagnosis     Esophageal reflux     Carotid atherosclerosis     CKD (chronic kidney disease) stage 3, GFR 30-59 ml/min     Insomnia     Mild major depression (H)     Advanced directives, counseling/discussion     Essential hypertension     Hyperlipidemia, unspecified     Scoliosis     Osteopenia     Postherpetic neuralgia     Other chronic pain     COPD (chronic obstructive pulmonary disease) (H)     Paroxysmal atrial fibrillation (H)     Anxiety     Hypokalemia     Anticoagulant long-term use     Heart failure with preserved left ventricular function (HFpEF) (H)     Past Surgical History:   Procedure Laterality Date     C FULL ROUT OBSTE CARE, DELIV  1964      C FULL ROUT OBSTE CARE, DELIV       C FULL ROUT OBSTE CARE, DELIV  1970     C TOTAL KNEE ARTHROPLASTY  1997    left     C TOTAL KNEE ARTHROPLASTY  2005    right     C TREAT ECTOPIC PREG,RMV TUBE/OVARY  1967    left     COLONOSCOPY  11    [facility]     HC REPAIR OF NASAL SEPTUM         Social History   Substance Use Topics     Smoking status: Former Smoker     Quit date: 1979     Smokeless tobacco: Never Used      Comment: no smokers in household     Alcohol use No      Comment: none since      Family History   Problem Relation Age of Onset     Substance Abuse Father      Cancer Daughter      Depression Daughter      Hypertension Mother            ROS:  CONSTITUTIONAL: NEGATIVE for fever, chills, change in weight  ENT/MOUTH: NEGATIVE for ear, mouth and throat problems  RESP: NEGATIVE for significant cough or shortness of breath   CV: NEGATIVE for chest pain, palpitations or peripheral edema    OBJECTIVE:     /64 (BP Location: Left arm, Patient Position: Chair, Cuff Size: Adult Regular)  Pulse 58  Temp 98.1  F (36.7  C) (Temporal)  Resp 14  Wt 101 lb (45.8 kg)  LMP 2011  SpO2 95%  BMI 21.67 kg/m2  Body mass index is 21.67 kg/(m^2).  Gen: no apparent distress  NECK: no adenopathy, no asymmetry, no masses  Chest: clear to auscultation without wheeze, rale or rhonchi  Cor: regular rate and rhythm without murmur  Back: Significant kyphoscoliosis noted.  She has tenderness of her left paracervical muscular nature.  She is full range of motion of her neck.  She is able to walk without assistive device.  Psych: Alert and oriented times 3; coherent speech, normal   rate and volume, able to articulate logical thoughts, able   to abstract reason, no tangential thoughts, no hallucinations   or delusions  Her affect is neutral    ASSESSMENT/PLAN:     1. Other chronic pain  We discussed her chronic opioid use and her multiple comorbid  conditions.  She has had difficult to 8 months with multiple hospitalizations.  She is also admits to weakness and poor balance for which she is doing physical therapy.  I would like to continue to decrease her opioids to decrease her risk of falls.  Patient is apprehensive about this but her  is supportive decreasing her dose.  We will continue her MS Contin at 15 mg twice daily and will decrease her Percocet from 7.5 mg 3 times a day to 5mg 3 times a day.  Will have her return in 1 month.    - morphine (MS CONTIN) 15 MG 12 hr tablet; Take 1 tablet (15 mg) by mouth every 12 hours  Dispense: 60 tablet; Refill: 0  - oxyCODONE-acetaminophen (PERCOCET) 5-325 MG per tablet; Take 1 tablet by mouth 3 times daily  Dispense: 90 tablet; Refill: 0    2. Hypokalemia  She is very concerned about her potassium and I feel it is reasonable to recheck.    - Basic metabolic panel    3. Essential hypertension  Blood pressure is well controlled.  Her pulse is improved.    - Basic metabolic panel    4. Kyphoscoliosis  We will ask physical therapy to also work with her neck.    - PHYSICAL THERAPY REFERRAL  - tiZANidine (ZANAFLEX) 2 MG tablet; Take 1 tablet (2 mg) by mouth At Bedtime  Dispense: 30 tablet; Refill: 1    5. Neck pain  We will do physical therapy.  Will try a small dose of tizanidine at night to see if this helps with some of her neck issues.    - PHYSICAL THERAPY REFERRAL  - tiZANidine (ZANAFLEX) 2 MG tablet; Take 1 tablet (2 mg) by mouth At Bedtime  Dispense: 30 tablet; Refill: 1    Silvia Matthews MD  [facility]

## 2018-09-06 ENCOUNTER — TRANSFERRED RECORDS (OUTPATIENT)
Dept: HEALTH INFORMATION MANAGEMENT | Facility: CLINIC | Age: 78
End: 2018-09-06

## 2018-09-11 ENCOUNTER — TRANSFERRED RECORDS (OUTPATIENT)
Dept: HEALTH INFORMATION MANAGEMENT | Facility: CLINIC | Age: 78
End: 2018-09-11

## 2018-09-13 ENCOUNTER — HOSPITAL ENCOUNTER (OUTPATIENT)
Dept: PHYSICAL THERAPY | Facility: CLINIC | Age: 78
Setting detail: THERAPIES SERIES
End: 2018-09-13
Attending: FAMILY MEDICINE
Payer: MEDICARE

## 2018-09-13 PROCEDURE — 40000719 ZZHC STATISTIC PT DEPARTMENT NEURO VISIT: Performed by: PHYSICAL THERAPIST

## 2018-09-13 PROCEDURE — 97110 THERAPEUTIC EXERCISES: CPT | Mod: GP | Performed by: PHYSICAL THERAPIST

## 2018-09-13 PROCEDURE — 97112 NEUROMUSCULAR REEDUCATION: CPT | Mod: GP | Performed by: PHYSICAL THERAPIST

## 2018-09-14 ENCOUNTER — OFFICE VISIT (OUTPATIENT)
Dept: FAMILY MEDICINE | Facility: OTHER | Age: 78
End: 2018-09-14
Payer: COMMERCIAL

## 2018-09-14 VITALS
RESPIRATION RATE: 14 BRPM | SYSTOLIC BLOOD PRESSURE: 128 MMHG | HEART RATE: 58 BPM | OXYGEN SATURATION: 95 % | BODY MASS INDEX: 21.67 KG/M2 | DIASTOLIC BLOOD PRESSURE: 64 MMHG | TEMPERATURE: 98.1 F | WEIGHT: 101 LBS

## 2018-09-14 DIAGNOSIS — M41.9 KYPHOSCOLIOSIS: ICD-10-CM

## 2018-09-14 DIAGNOSIS — I10 ESSENTIAL HYPERTENSION: ICD-10-CM

## 2018-09-14 DIAGNOSIS — G89.29 OTHER CHRONIC PAIN: Primary | ICD-10-CM

## 2018-09-14 DIAGNOSIS — M54.2 NECK PAIN: ICD-10-CM

## 2018-09-14 DIAGNOSIS — E87.6 HYPOKALEMIA: ICD-10-CM

## 2018-09-14 LAB
ANION GAP SERPL CALCULATED.3IONS-SCNC: 7 MMOL/L (ref 3–14)
BUN SERPL-MCNC: 19 MG/DL (ref 7–30)
CALCIUM SERPL-MCNC: 8.7 MG/DL (ref 8.5–10.1)
CHLORIDE SERPL-SCNC: 98 MMOL/L (ref 94–109)
CO2 SERPL-SCNC: 33 MMOL/L (ref 20–32)
CREAT SERPL-MCNC: 0.89 MG/DL (ref 0.52–1.04)
GFR SERPL CREATININE-BSD FRML MDRD: 62 ML/MIN/1.7M2
GLUCOSE SERPL-MCNC: 97 MG/DL (ref 70–99)
POTASSIUM SERPL-SCNC: 4.6 MMOL/L (ref 3.4–5.3)
SODIUM SERPL-SCNC: 138 MMOL/L (ref 133–144)

## 2018-09-14 PROCEDURE — 36415 COLL VENOUS BLD VENIPUNCTURE: CPT | Performed by: FAMILY MEDICINE

## 2018-09-14 PROCEDURE — 99214 OFFICE O/P EST MOD 30 MIN: CPT | Performed by: FAMILY MEDICINE

## 2018-09-14 PROCEDURE — 80048 BASIC METABOLIC PNL TOTAL CA: CPT | Performed by: FAMILY MEDICINE

## 2018-09-14 RX ORDER — MORPHINE SULFATE 15 MG/1
15 TABLET, FILM COATED, EXTENDED RELEASE ORAL EVERY 12 HOURS
Qty: 60 TABLET | Refills: 0 | Status: SHIPPED | OUTPATIENT
Start: 2018-09-14 | End: 2018-10-12

## 2018-09-14 RX ORDER — TIZANIDINE 2 MG/1
2 TABLET ORAL AT BEDTIME
Qty: 30 TABLET | Refills: 1 | Status: SHIPPED | OUTPATIENT
Start: 2018-09-14 | End: 2018-10-12

## 2018-09-14 RX ORDER — OXYCODONE AND ACETAMINOPHEN 5; 325 MG/1; MG/1
1 TABLET ORAL 3 TIMES DAILY
Qty: 90 TABLET | Refills: 0 | Status: SHIPPED | OUTPATIENT
Start: 2018-09-14 | End: 2018-10-12 | Stop reason: DRUGHIGH

## 2018-09-14 NOTE — MR AVS SNAPSHOT
"              After Visit Summary   9/14/2018    Katherin Jonas    MRN: [id number]           Patient Information     Date Of Birth          1940        Visit Information        Provider Department      9/14/2018 7:20 AM Silvia Matthews MD [facility]        Today's Diagnoses     Other chronic pain    -  1    Hypokalemia        Essential hypertension        Kyphoscoliosis        Neck pain           Follow-ups after your visit        Additional Services     PHYSICAL THERAPY REFERRAL       *This therapy referral will be filtered to a centralized scheduling office at [facility] and the patient will receive a call to schedule an appointment at a [facility] location most convenient for them. *     [facility] provides Physical Therapy evaluation and treatment and many specialty services across the [facility] system.  If requesting a specialty program, please choose from the list below.    If you have not heard from the scheduling office within 2 business days, please call [phone] for all locations, with the exception of [place], please call [phone] and [facility], please call [phone]  Treatment: Evaluation & Treatment  Special Instructions/Modalities: already being seen at [facility] and would like to also be seen for her neck  Special Programs:     Please be aware that coverage of these services is subject to the terms and limitations of your health insurance plan.  Call member services at your health plan with any benefit or coverage questions.      **Note to Provider:  If you are referring outside of [facility] for the therapy appointment, please list the name of the location in the \"special instructions\" above, print the referral and give to the patient to schedule the appointment.                  Follow-up notes from your care team     Return in about 4 weeks (around 10/12/2018) for pain.      Your next 10 appointments already " scheduled     Sep 21, 2018  2:30 PM CDT   Neuro Treatment with Reanna Roberson, PT   [facility] ([facility])    [address] [facility] [address]   [phone]            Sep 28, 2018  2:45 PM CDT   Neuro Treatment with Sharon Vila, PT   [facility] ([facility])    [address] [facility] [address]   [phone]            Oct 05, 2018  1:00 PM CDT   Neuro Treatment with Sharon Vila, PT   [facility] ([facility])    [address] [facility] [address]   [phone]            Oct 12, 2018  9:40 AM CDT   Office Visit with Silvia Matthews MD   [facility]    [address] [facility] [address]  [facility] [address] [phone]           Bring a current list of meds and any records pertaining to this visit. For Physicals, please bring immunization records and any forms needing to be filled out. Please arrive 10 minutes early to complete paperwork.            Oct 12, 2018  1:00 PM CDT   Neuro Treatment with Sharon Vila, PT   [facility] ([facility])    [address] [facility] [address]   [phone]            Oct 19, 2018  1:00 PM CDT   Neuro Treatment with Sharon Vila, PT   [facility] ([facility])    [address] [facility] [address]   [phone]            Oct 26, 2018  1:00 PM CDT   Neuro Treatment with Gretchen Ryan, PT   [facility] ([facility])    [address] [facility] [address]   [phone]              Who to contact     If you have questions or need follow up information about today's clinic visit or your schedule please contact [facility] directly at [phone].  Normal or non-critical lab and imaging results will be  communicated to you by MyChart, letter or phone within 4 business days after the clinic has received the results. If you do not hear from us within 7 days, please contact the clinic through MyChart or phone. If you have a critical or abnormal lab result, we will notify you by phone as soon as possible.  Submit refill requests through Spoke or call your pharmacy and they will forward the refill request to us. Please allow 3 business days for your refill to be completed.          Additional Information About Your Visit        Care EveryWhere ID     This is your Care EveryWhere ID. This could be used by other organizations to access your [facility] medical records  [id number]        Your Vitals Were     Pulse Temperature Respirations Last Period Pulse Oximetry BMI (Body Mass Index)    58 98.1  F (36.7  C) (Temporal) 14 02/01/2011 95% 21.67 kg/m2       Blood Pressure from Last 3 Encounters:   09/14/18 128/64   08/24/18 116/52   08/14/18 122/58    Weight from Last 3 Encounters:   09/14/18 101 lb (45.8 kg)   08/24/18 101 lb (45.8 kg)   08/14/18 105 lb (47.6 kg)              We Performed the Following     Basic metabolic panel     PHYSICAL THERAPY REFERRAL          Today's Medication Changes          These changes are accurate as of 9/14/18  7:48 AM.  If you have any questions, ask your nurse or doctor.               Start taking these medicines.        Dose/Directions    oxyCODONE-acetaminophen 5-325 MG per tablet   Commonly known as:  PERCOCET   Used for:  Other chronic pain   Started by:  Silvia Matthews MD        Dose:  1 tablet   Take 1 tablet by mouth 3 times daily   Quantity:  90 tablet   Refills:  0       tiZANidine 2 MG tablet   Commonly known as:  ZANAFLEX   Used for:  Kyphoscoliosis, Neck pain   Started by:  Silvia Matthews MD        Dose:  2 mg   Take 1 tablet (2 mg) by mouth At Bedtime   Quantity:  30 tablet   Refills:  1            Where to get your medicines      These medications were sent  to [id number] [place] [place] - [address] AT [facility] & [address]  [address], [place] [facility] [address]     Phone:  [phone]     tiZANidine 2 MG tablet         Some of these will need a paper prescription and others can be bought over the counter.  Ask your nurse if you have questions.     Bring a paper prescription for each of these medications     morphine 15 MG 12 hr tablet    oxyCODONE-acetaminophen 5-325 MG per tablet               Information about OPIOIDS     PRESCRIPTION OPIOIDS: WHAT YOU NEED TO KNOW   We gave you an opioid (narcotic) pain medicine. It is important to manage your pain, but opioids are not always the best choice. You should first try all the other options your care team gave you. Take this medicine for as short a time (and as few doses) as possible.    Some activities can increase your pain, such as bandage changes or therapy sessions. It may help to take your pain medicine 30 to 60 minutes before these activities. Reduce your stress by getting enough sleep, working on hobbies you enjoy and practicing relaxation or meditation. Talk to your care team about ways to manage your pain beyond prescription opioids.    These medicines have risks:    DO NOT drive when on new or higher doses of pain medicine. These medicines can affect your alertness and reaction times, and you could be arrested for driving under the influence (DUI). If you need to use opioids long-term, talk to your care team about driving.    DO NOT operate heavy machinery    DO NOT do any other dangerous activities while taking these medicines.    DO NOT drink any alcohol while taking these medicines.     If the opioid prescribed includes acetaminophen, DO NOT take with any other medicines that contain acetaminophen. Read all labels carefully. Look for the word  acetaminophen  or  Tylenol.  Ask your pharmacist if you have questions or are unsure.    You can get addicted to pain medicines,  especially if you have a history of addiction (chemical, alcohol or substance dependence). Talk to your care team about ways to reduce this risk.    All opioids tend to cause constipation. Drink plenty of water and eat foods that have a lot of fiber, such as fruits, vegetables, prune juice, apple juice and high-fiber cereal. Take a laxative (Miralax, milk of magnesia, Colace, Senna) if you don t move your bowels at least every other day. Other side effects include upset stomach, sleepiness, dizziness, throwing up, tolerance (needing more of the medicine to have the same effect), physical dependence and slowed breathing.    Store your pills in a secure place, locked if possible. We will not replace any lost or stolen medicine. If you don t finish your medicine, please throw away (dispose) as directed by your pharmacist. The [place] Pollution Control Agency has more information about safe disposal: [email or web address]-[email or web address]         Primary Care Provider Office Phone # Fax #    Silvia Matthews [phone]       [address] [place] [address]        Equal Access to Services     [facility] Center[facility] AH: Hadii javier esposito hadedeno Soaurea, waaxda luqadaha, qaybta kaalmada adereneyada, martínez ramirez . So [facility] [phone].    ATENCIÓN: Si habla español, tiene a bernardo disposición servicios gratuitos de asistencia lingüística. Bingame al [phone].    We comply with applicable federal civil rights laws and [place] laws. We do not discriminate on the basis of race, color, national origin, age, disability, sex, sexual orientation, or gender identity.            Thank you!     Thank you for choosing [facility]  for your care. Our goal is always to provide you with excellent care. Hearing back from our patients is one way we can continue to improve our services. Please take a few minutes to complete the written survey that  you may receive in the mail after your visit with us. Thank you!             Your Updated Medication List - Protect others around you: Learn how to safely use, store and throw away your medicines at www.disposemymeds.org.          This list is accurate as of 9/14/18  7:48 AM.  Always use your most recent med list.                   Brand Name Dispense Instructions for use Diagnosis    ACE/ARB/ARNI NOT PRESCRIBED (INTENTIONAL)      continuous prn. ACE & ARB not prescribed due to Other:BP controlled, no microalbuminuria        amiodarone 200 MG tablet    PACERONE/CODARONE     Take 400 mg by mouth daily        amLODIPine 2.5 MG tablet    NORVASC    90 tablet    Take 1 tablet (2.5 mg) by mouth daily    Essential hypertension       ASPIRIN NOT PRESCRIBED    INTENTIONAL    0 each    Please choose reason not prescribed, below    Essential hypertension with goal blood pressure less than 140/90       atorvastatin 10 MG tablet    LIPITOR    90 tablet    Take 1 tablet (10 mg) by mouth daily    Hyperlipidemia, unspecified       CALCIUM CITRATE + D PO      1,200 mg daily        eszopiclone 3 MG tablet    LUNESTA    90 tablet    Take 1 tablet (3 mg) by mouth nightly as needed    Insomnia, unspecified type       FLUoxetine 40 MG capsule    PROzac    90 capsule    Take 1 capsule (40 mg) by mouth daily    Mild major depression (H)       furosemide 20 MG tablet    LASIX     Take 40 mg by mouth daily        gabapentin 300 MG capsule    NEURONTIN    360 capsule    TAKE ONE CAPSULE BY MOUTH FOUR TIMES A DAY    Postherpetic neuralgia       lidocaine 5 % Patch    LIDODERM    30 patch    Apply up to 3 patches to painful area at once for up to 12 h within a 24 h period.  Remove after 12 hours.    Other chronic pain, Postherpetic neuralgia       Lutein 6 MG Tabs      Take  by mouth daily.        metoprolol succinate 25 MG 24 hr tablet    TOPROL-XL    30 tablet    Take 1 tablet (25 mg) by mouth daily    Essential hypertension with goal blood  pressure less than 140/90       morphine 15 MG 12 hr tablet    MS CONTIN    60 tablet    Take 1 tablet (15 mg) by mouth every 12 hours    Other chronic pain       MULTI-VITAMIN PO      once daily        naloxone auto-injector    EVZIO    0.8 mL    Inject 0.4 mLs (0.4 mg) into the muscle as needed for opioid reversal every 2-3 minutes until assistance arrives    Chronic, continuous use of opioids       * nystatin 432508 UNIT/GM Powd    NYSTOP    120 g    APPLY 1 DOSE TOPICALLY THREE TIMES DAILY AS NEEDED    Intertrigo       * nystatin cream    MYCOSTATIN    60 g    APPLY TOPICALLY DAILY AS NEEDED(RASH UNDER BREAST AND IN GROIN)    Intertrigo       ondansetron 4 MG ODT tab    ZOFRAN-ODT     Take 4 mg by mouth every 8 hours as needed for nausea        oxyCODONE-acetaminophen 5-325 MG per tablet    PERCOCET    90 tablet    Take 1 tablet by mouth 3 times daily    Other chronic pain       ranitidine 150 MG tablet    ZANTAC     Take 150 mg by mouth daily        SENNA-GEN 8.6 MG tablet   Generic drug:  senna     120    2 TABLETS AT Twice daily    Unspecified constipation       spironolactone 25 MG tablet    ALDACTONE    20 tablet    Take 1 tablet (25 mg) by mouth daily    Paroxysmal atrial fibrillation (H)       tiZANidine 2 MG tablet    ZANAFLEX    30 tablet    Take 1 tablet (2 mg) by mouth At Bedtime    Kyphoscoliosis, Neck pain       triamcinolone 0.1 % cream    KENALOG    15 g    APPLY SPARINGLY EXTERNALLY TO THE AFFECTED AREA THREE TIMES DAILY FOR 14 DAYS    Dermatitis       XARELTO 15 MG Tabs tablet   Generic drug:  rivaroxaban ANTICOAGULANT     20 tablet    Take 1 tablet (15 mg) by mouth daily    Paroxysmal atrial fibrillation (H)       * Notice:  This list has 2 medication(s) that are the same as other medications prescribed for you. Read the directions carefully, and ask your doctor or other care provider to review them with you.

## 2018-09-21 ENCOUNTER — HOSPITAL ENCOUNTER (OUTPATIENT)
Dept: PHYSICAL THERAPY | Facility: CLINIC | Age: 78
Setting detail: THERAPIES SERIES
End: 2018-09-21
Attending: FAMILY MEDICINE
Payer: MEDICARE

## 2018-09-21 DIAGNOSIS — E78.5 HYPERLIPIDEMIA, UNSPECIFIED HYPERLIPIDEMIA TYPE: Primary | ICD-10-CM

## 2018-09-21 DIAGNOSIS — E78.5 HYPERLIPIDEMIA, UNSPECIFIED: ICD-10-CM

## 2018-09-21 PROCEDURE — 40000719 ZZHC STATISTIC PT DEPARTMENT NEURO VISIT: Performed by: PHYSICAL THERAPIST

## 2018-09-21 PROCEDURE — 97116 GAIT TRAINING THERAPY: CPT | Mod: GP | Performed by: PHYSICAL THERAPIST

## 2018-09-21 PROCEDURE — 97110 THERAPEUTIC EXERCISES: CPT | Mod: GP | Performed by: PHYSICAL THERAPIST

## 2018-09-21 RX ORDER — ATORVASTATIN CALCIUM 10 MG/1
TABLET, FILM COATED ORAL
Qty: 90 TABLET | Refills: 1 | Status: SHIPPED | OUTPATIENT
Start: 2018-09-21 | End: 2019-03-15

## 2018-09-28 ENCOUNTER — HOSPITAL ENCOUNTER (OUTPATIENT)
Dept: PHYSICAL THERAPY | Facility: CLINIC | Age: 78
Setting detail: THERAPIES SERIES
End: 2018-09-28
Attending: FAMILY MEDICINE
Payer: MEDICARE

## 2018-09-28 PROCEDURE — 40000719 ZZHC STATISTIC PT DEPARTMENT NEURO VISIT: Performed by: PHYSICAL THERAPIST

## 2018-09-28 PROCEDURE — 97110 THERAPEUTIC EXERCISES: CPT | Mod: GP | Performed by: PHYSICAL THERAPIST

## 2018-09-28 PROCEDURE — 97116 GAIT TRAINING THERAPY: CPT | Mod: GP | Performed by: PHYSICAL THERAPIST

## 2018-10-05 ENCOUNTER — HOSPITAL ENCOUNTER (OUTPATIENT)
Dept: PHYSICAL THERAPY | Facility: CLINIC | Age: 78
Setting detail: THERAPIES SERIES
End: 2018-10-05
Attending: FAMILY MEDICINE
Payer: MEDICARE

## 2018-10-05 PROCEDURE — 97110 THERAPEUTIC EXERCISES: CPT | Mod: GP | Performed by: PHYSICAL THERAPIST

## 2018-10-05 PROCEDURE — 40000719 ZZHC STATISTIC PT DEPARTMENT NEURO VISIT: Performed by: PHYSICAL THERAPIST

## 2018-10-05 PROCEDURE — 97112 NEUROMUSCULAR REEDUCATION: CPT | Mod: GP | Performed by: PHYSICAL THERAPIST

## 2018-10-09 NOTE — PROGRESS NOTES
SUBJECTIVE:   Katherin Jonas is a 77 year old female who presents to clinic today for the following health issues:    HPI  Back Pain Follow Up      Description:   Location of pain:  bilateral  Character of pain: sharp, dull ache and stabbing  Pain radiation: Does not radiate, radiates into the right buttocks and radiates into the left buttocks  Since last visit, pain is:  Worsened--she feels that the increased Percocet was more effective for her pain  New numbness or weakness in legs, not attributed to pain:  no     Intensity: Currently 3/10    History:   Pain interferes with job: Not applicable  Therapies tried without relief: Varies  Therapies tried with relief: opioids and Physical Therapy     Problem list and histories reviewed & adjusted, as indicated.  Additional history: as documented    Patient Active Problem List   Diagnosis     Esophageal reflux     Carotid atherosclerosis     CKD (chronic kidney disease) stage 3, GFR 30-59 ml/min (H)     Insomnia     Mild major depression (H)     Advanced directives, counseling/discussion     Essential hypertension     Hyperlipidemia, unspecified     Scoliosis     Osteopenia     Postherpetic neuralgia     Other chronic pain     COPD (chronic obstructive pulmonary disease) (H)     Paroxysmal atrial fibrillation (H)     Anxiety     Anticoagulant long-term use     Heart failure with preserved left ventricular function (HFpEF) (H)     Past Surgical History:   Procedure Laterality Date     C FULL ROUT OBSTE CARE, DELIV  1964     C FULL ROUT OBSTE CARE, DELIV       C FULL ROUT OBSTE CARE, DELIV  1970     C TOTAL KNEE ARTHROPLASTY  1997    left     C TOTAL KNEE ARTHROPLASTY  2005    right     C TREAT ECTOPIC PREG,RMV TUBE/OVARY  1967    left     COLONOSCOPY  11    [facility]     HC REPAIR OF NASAL SEPTUM         Social History   Substance Use Topics     Smoking status: Former Smoker     Quit date: 1979      Smokeless tobacco: Never Used      Comment: no smokers in household     Alcohol use No      Comment: none since 2006     Family History   Problem Relation Age of Onset     Substance Abuse Father      Cancer Daughter      Depression Daughter      Hypertension Mother            ROS:  CONSTITUTIONAL: NEGATIVE for fever, chills, change in weight  ENT/MOUTH: NEGATIVE for ear, mouth and throat problems  RESP: NEGATIVE for significant cough or shortness of breath   CV: NEGATIVE for chest pain, palpitations or peripheral edema    OBJECTIVE:     /52  Pulse 55  Temp 97.2  F (36.2  C) (Temporal)  Resp 14  Wt 102 lb (46.3 kg)  LMP 02/01/2011  SpO2 98%  BMI 21.88 kg/m2  Body mass index is 21.88 kg/(m^2).  Gen: no apparent distress, slight hoarse voice  NECK: no adenopathy, no asymmetry, no masses  Back:  Marked kyphoscoliosis  Chest: clear to auscultation without wheeze, rale or rhonchi  Cor: regular rate and rhythm without murmur  ABDOMEN: soft, nontender, no masses and bowel sounds normal  Ext: warm and dry without edema  Psych: Alert and oriented times 3; coherent speech, normal   rate and volume, able to articulate logical thoughts, able   to abstract reason, no tangential thoughts, no hallucinations   or delusions  Her affect is neutral     ASSESSMENT/PLAN:     1. Other chronic pain  Has been on chronic narcotics for many years, she has significant kyphoscoliosis and is no longer a surgical candidate, have slowly been decreasing her dose, she also had significant illness this past year.  Feels pain worsened after last decrease.  Discussed increasing her Percocet from 5/325 three times daily to 7.5/325 three times daily and decreasing her MS Contin from 15mg twice daily to 15 mg once daily, follow up in 1 month.  May consider stopping MS Contin completely and controlling with Percocet.  Follow up in 1 month.    - morphine (MS CONTIN) 15 MG 12 hr tablet; Take 1 tablet (15 mg) by mouth daily  Dispense: 30 tablet;  Refill: 0  - oxyCODONE-acetaminophen (PERCOCET) 7.5-325 MG per tablet; Take 1 tablet by mouth 3 times daily  Dispense: 90 tablet; Refill: 0    2. Insomnia, unspecified type  She has tried multiple medications and Lunesta has been the only thing that has been effective.  Will refill, discussed may need to consider attempting dose reduction again.    - eszopiclone (LUNESTA) 3 MG tablet; Take 1 tablet (3 mg) by mouth nightly as needed  Dispense: 90 tablet; Refill: 1    3. Postherpetic neuralgia  Refills given    - gabapentin (NEURONTIN) 300 MG capsule; TAKE ONE CAPSULE BY MOUTH FOUR TIMES A DAY  Dispense: 360 capsule; Refill: 3    4. Essential hypertension with goal blood pressure less than 140/90  Well controlled.    - metoprolol succinate (TOPROL-XL) 25 MG 24 hr tablet; Take 1 tablet (25 mg) by mouth daily  Dispense: 90 tablet; Refill: 3  - Renal panel (Alb, BUN, Ca, Cl, CO2, Creat, Gluc, Phos, K, Na)    5. Intertrigo  Refills given.    - nystatin (MYCOSTATIN) cream; APPLY TOPICALLY DAILY AS NEEDED(RASH UNDER BREAST AND IN GROIN)  Dispense: 60 g; Refill: 11  - nystatin (NYSTOP) 931701 UNIT/GM POWD; APPLY 1 DOSE TOPICALLY THREE TIMES DAILY AS NEEDED  Dispense: 120 g; Refill: 11    6. Kyphoscoliosis  Refills given.    - tiZANidine (ZANAFLEX) 2 MG tablet; Take 1 tablet (2 mg) by mouth At Bedtime  Dispense: 90 tablet; Refill: 3    7. Neck pain  Refills given.    - tiZANidine (ZANAFLEX) 2 MG tablet; Take 1 tablet (2 mg) by mouth At Bedtime  Dispense: 90 tablet; Refill: 3    8. Hyperlipidemia, unspecified hyperlipidemia type  Remains on statin, will check labs.    - Lipid panel reflex to direct LDL Fasting    9. Paroxysmal atrial fibrillation (H)  Continues on Xarelto, follows with Cardiology.     10. CKD (chronic kidney disease) stage 3, GFR 30-59 ml/min (H)  Labs drawn, she is very concerned about her potassium.    - Renal panel (Alb, BUN, Ca, Cl, CO2, Creat, Gluc, Phos, K, Na)    Silvia Matthews MD  [place]  [facility]

## 2018-10-12 ENCOUNTER — OFFICE VISIT (OUTPATIENT)
Dept: FAMILY MEDICINE | Facility: OTHER | Age: 78
End: 2018-10-12
Payer: COMMERCIAL

## 2018-10-12 VITALS
DIASTOLIC BLOOD PRESSURE: 52 MMHG | WEIGHT: 102 LBS | BODY MASS INDEX: 21.88 KG/M2 | SYSTOLIC BLOOD PRESSURE: 120 MMHG | TEMPERATURE: 97.2 F | RESPIRATION RATE: 14 BRPM | OXYGEN SATURATION: 98 % | HEART RATE: 55 BPM

## 2018-10-12 DIAGNOSIS — M54.2 NECK PAIN: ICD-10-CM

## 2018-10-12 DIAGNOSIS — I48.0 PAROXYSMAL ATRIAL FIBRILLATION (H): ICD-10-CM

## 2018-10-12 DIAGNOSIS — M41.9 KYPHOSCOLIOSIS: ICD-10-CM

## 2018-10-12 DIAGNOSIS — B02.29 POSTHERPETIC NEURALGIA: ICD-10-CM

## 2018-10-12 DIAGNOSIS — G89.29 OTHER CHRONIC PAIN: Primary | ICD-10-CM

## 2018-10-12 DIAGNOSIS — L30.4 INTERTRIGO: ICD-10-CM

## 2018-10-12 DIAGNOSIS — G47.00 INSOMNIA, UNSPECIFIED TYPE: ICD-10-CM

## 2018-10-12 DIAGNOSIS — E78.5 HYPERLIPIDEMIA, UNSPECIFIED HYPERLIPIDEMIA TYPE: ICD-10-CM

## 2018-10-12 DIAGNOSIS — N18.30 CKD (CHRONIC KIDNEY DISEASE) STAGE 3, GFR 30-59 ML/MIN (H): ICD-10-CM

## 2018-10-12 DIAGNOSIS — I10 ESSENTIAL HYPERTENSION WITH GOAL BLOOD PRESSURE LESS THAN 140/90: ICD-10-CM

## 2018-10-12 PROBLEM — E87.6 HYPOKALEMIA: Status: RESOLVED | Noted: 2018-07-17 | Resolved: 2018-10-12

## 2018-10-12 LAB
ALBUMIN SERPL-MCNC: 3.4 G/DL (ref 3.4–5)
ANION GAP SERPL CALCULATED.3IONS-SCNC: 6 MMOL/L (ref 3–14)
BUN SERPL-MCNC: 22 MG/DL (ref 7–30)
CALCIUM SERPL-MCNC: 9.2 MG/DL (ref 8.5–10.1)
CHLORIDE SERPL-SCNC: 99 MMOL/L (ref 94–109)
CHOLEST SERPL-MCNC: 129 MG/DL
CO2 SERPL-SCNC: 32 MMOL/L (ref 20–32)
CREAT SERPL-MCNC: 1.06 MG/DL (ref 0.52–1.04)
GFR SERPL CREATININE-BSD FRML MDRD: 50 ML/MIN/1.7M2
GLUCOSE SERPL-MCNC: 100 MG/DL (ref 70–99)
HDLC SERPL-MCNC: 62 MG/DL
LDLC SERPL CALC-MCNC: 56 MG/DL
NONHDLC SERPL-MCNC: 67 MG/DL
PHOSPHATE SERPL-MCNC: 4.4 MG/DL (ref 2.5–4.5)
POTASSIUM SERPL-SCNC: 5.2 MMOL/L (ref 3.4–5.3)
SODIUM SERPL-SCNC: 137 MMOL/L (ref 133–144)
TRIGL SERPL-MCNC: 57 MG/DL

## 2018-10-12 PROCEDURE — 99214 OFFICE O/P EST MOD 30 MIN: CPT | Performed by: FAMILY MEDICINE

## 2018-10-12 PROCEDURE — 80061 LIPID PANEL: CPT | Performed by: FAMILY MEDICINE

## 2018-10-12 PROCEDURE — 80069 RENAL FUNCTION PANEL: CPT | Performed by: FAMILY MEDICINE

## 2018-10-12 PROCEDURE — 36415 COLL VENOUS BLD VENIPUNCTURE: CPT | Performed by: FAMILY MEDICINE

## 2018-10-12 RX ORDER — METOPROLOL SUCCINATE 25 MG/1
25 TABLET, EXTENDED RELEASE ORAL DAILY
Qty: 90 TABLET | Refills: 3 | Status: SHIPPED | OUTPATIENT
Start: 2018-10-12 | End: 2019-10-08

## 2018-10-12 RX ORDER — GABAPENTIN 300 MG/1
CAPSULE ORAL
Qty: 360 CAPSULE | Refills: 3 | Status: SHIPPED | OUTPATIENT
Start: 2018-10-12 | End: 2019-10-08

## 2018-10-12 RX ORDER — ESZOPICLONE 3 MG/1
3 TABLET, FILM COATED ORAL
Qty: 90 TABLET | Refills: 1 | Status: SHIPPED | OUTPATIENT
Start: 2018-10-12 | End: 2019-05-07

## 2018-10-12 RX ORDER — NYSTATIN 100000 U/G
CREAM TOPICAL
Qty: 60 G | Refills: 11 | Status: SHIPPED | OUTPATIENT
Start: 2018-10-12 | End: 2022-01-01

## 2018-10-12 RX ORDER — NYSTATIN 100000 [USP'U]/G
POWDER TOPICAL
Qty: 120 G | Refills: 11 | Status: SHIPPED | OUTPATIENT
Start: 2018-10-12 | End: 2021-01-01

## 2018-10-12 RX ORDER — OXYCODONE AND ACETAMINOPHEN 7.5; 325 MG/1; MG/1
1 TABLET ORAL 3 TIMES DAILY
Qty: 90 TABLET | Refills: 0 | Status: SHIPPED | OUTPATIENT
Start: 2018-10-12 | End: 2018-11-09

## 2018-10-12 RX ORDER — MORPHINE SULFATE 15 MG/1
15 TABLET, FILM COATED, EXTENDED RELEASE ORAL DAILY
Qty: 30 TABLET | Refills: 0 | Status: SHIPPED | OUTPATIENT
Start: 2018-10-12 | End: 2018-11-09

## 2018-10-12 RX ORDER — TIZANIDINE 2 MG/1
2 TABLET ORAL AT BEDTIME
Qty: 90 TABLET | Refills: 3 | Status: SHIPPED | OUTPATIENT
Start: 2018-10-12 | End: 2019-06-11 | Stop reason: SINTOL

## 2018-10-12 NOTE — MR AVS SNAPSHOT
After Visit Summary   10/12/2018    Katherin Jonas    MRN: [id number]           Patient Information     Date Of Birth          1940        Visit Information        Provider Department      10/12/2018 9:40 AM Silvia Matthews MD [facility]        Today's Diagnoses     Other chronic pain    -  1    Insomnia, unspecified type        Postherpetic neuralgia        Essential hypertension with goal blood pressure less than 140/90        Intertrigo        Kyphoscoliosis        Neck pain        Hyperlipidemia, unspecified hyperlipidemia type        Paroxysmal atrial fibrillation (H)        CKD (chronic kidney disease) stage 3, GFR 30-59 ml/min (H)           Follow-ups after your visit        Follow-up notes from your care team     Return in about 4 weeks (around 11/9/2018) for pain.      Your next 10 appointments already scheduled     Oct 19, 2018  1:00 PM CDT   Neuro Treatment with Sharon Vila, PT   [facility] ([facility])    [address] [facility] [address]   [phone]            Oct 26, 2018  1:00 PM CDT   Neuro Treatment with Gretchen Ryan, PT   [facility] ([facility])    [address] [facility] [address]   [phone]            Nov 09, 2018  9:20 AM CST   Office Visit with Silvia Matthews MD   [facility] ([facility])    [address]  [facility] [address]   [phone]           Bring a current list of meds and any records pertaining to this visit. For Physicals, please bring immunization records and any forms needing to be filled out. Please arrive 10 minutes early to complete paperwork.              Who to contact     If you have questions or need follow up information about today's clinic visit or your schedule please contact [facility] directly at [phone].  Normal or non-critical lab and  imaging results will be communicated to you by MyChart, letter or phone within 4 business days after the clinic has received the results. If you do not hear from us within 7 days, please contact the clinic through MyChart or phone. If you have a critical or abnormal lab result, we will notify you by phone as soon as possible.  Submit refill requests through [employer] or call your pharmacy and they will forward the refill request to us. Please allow 3 business days for your refill to be completed.          Additional Information About Your Visit        Care EveryWhere ID     This is your Care EveryWhere ID. This could be used by other organizations to access your [facility] medical records  [id number]        Your Vitals Were     Pulse Temperature Respirations Last Period Pulse Oximetry BMI (Body Mass Index)    55 97.2  F (36.2  C) (Temporal) 14 02/01/2011 98% 21.88 kg/m2       Blood Pressure from Last 3 Encounters:   10/12/18 120/52   09/14/18 128/64   08/24/18 116/52    Weight from Last 3 Encounters:   10/12/18 102 lb (46.3 kg)   09/14/18 101 lb (45.8 kg)   08/24/18 101 lb (45.8 kg)              We Performed the Following     Lipid panel reflex to direct LDL Fasting     Renal panel (Alb, BUN, Ca, Cl, CO2, Creat, Gluc, Phos, K, Na)          Today's Medication Changes          These changes are accurate as of 10/12/18 10:43 AM.  If you have any questions, ask your nurse or doctor.               Start taking these medicines.        Dose/Directions    oxyCODONE-acetaminophen 7.5-325 MG per tablet   Commonly known as:  PERCOCET   Used for:  Other chronic pain   Replaces:  oxyCODONE-acetaminophen 5-325 MG per tablet   Started by:  Silvia Matthews MD        Dose:  1 tablet   Take 1 tablet by mouth 3 times daily   Quantity:  90 tablet   Refills:  0         These medicines have changed or have updated prescriptions.        Dose/Directions    morphine 15 MG 12 hr tablet   Commonly known as:  MS CONTIN   This may have  changed:  when to take this   Used for:  Other chronic pain   Changed by:  Silvia Matthews MD        Dose:  15 mg   Take 1 tablet (15 mg) by mouth daily   Quantity:  30 tablet   Refills:  0         Stop taking these medicines if you haven't already. Please contact your care team if you have questions.     oxyCODONE-acetaminophen 5-325 MG per tablet   Commonly known as:  PERCOCET   Replaced by:  oxyCODONE-acetaminophen 7.5-325 MG per tablet   Stopped by:  Silvia Matthews MD                Where to get your medicines      These medications were sent to [facility] [address] [facility] [address] AT [facility]  [address] [place] [address] , [facility] [address]     Phone:  [phone]     gabapentin 300 MG capsule    metoprolol succinate 25 MG 24 hr tablet    nystatin 025474 UNIT/GM Powd    nystatin cream    tiZANidine 2 MG tablet         Some of these will need a paper prescription and others can be bought over the counter.  Ask your nurse if you have questions.     Bring a paper prescription for each of these medications     eszopiclone 3 MG tablet    morphine 15 MG 12 hr tablet    oxyCODONE-acetaminophen 7.5-325 MG per tablet               Information about OPIOIDS     PRESCRIPTION OPIOIDS: WHAT YOU NEED TO KNOW   We gave you an opioid (narcotic) pain medicine. It is important to manage your pain, but opioids are not always the best choice. You should first try all the other options your care team gave you. Take this medicine for as short a time (and as few doses) as possible.    Some activities can increase your pain, such as bandage changes or therapy sessions. It may help to take your pain medicine 30 to 60 minutes before these activities. Reduce your stress by getting enough sleep, working on hobbies you enjoy and practicing relaxation or meditation. Talk to your care team about ways to manage your pain beyond prescription opioids.    These medicines have risks:    DO  NOT drive when on new or higher doses of pain medicine. These medicines can affect your alertness and reaction times, and you could be arrested for driving under the influence (DUI). If you need to use opioids long-term, talk to your care team about driving.    DO NOT operate heavy machinery    DO NOT do any other dangerous activities while taking these medicines.    DO NOT drink any alcohol while taking these medicines.     If the opioid prescribed includes acetaminophen, DO NOT take with any other medicines that contain acetaminophen. Read all labels carefully. Look for the word  acetaminophen  or  Tylenol.  Ask your pharmacist if you have questions or are unsure.    You can get addicted to pain medicines, especially if you have a history of addiction (chemical, alcohol or substance dependence). Talk to your care team about ways to reduce this risk.    All opioids tend to cause constipation. Drink plenty of water and eat foods that have a lot of fiber, such as fruits, vegetables, prune juice, apple juice and high-fiber cereal. Take a laxative (Miralax, milk of magnesia, Colace, Senna) if you don t move your bowels at least every other day. Other side effects include upset stomach, sleepiness, dizziness, throwing up, tolerance (needing more of the medicine to have the same effect), physical dependence and slowed breathing.    Store your pills in a secure place, locked if possible. We will not replace any lost or stolen medicine. If you don t finish your medicine, please throw away (dispose) as directed by your pharmacist. The [place] Pollution [employer] has more information about safe disposal: https://www.pca.[facility].mn.us/living-green/managing-unwanted-medications         Primary Care Provider Office Phone # Fax #    Silvia Matthews [phone]       [address]  [facility] [address]        Equal Access to Services     SURINDER HUERTA AH: isrrael Mitchell  ivánmadyson martínez zarco. So [facility] [phone].    ATENCIÓN: Si alee rodriguez, tiene a bernardo disposición servicios gratuitos de asistencia lingüística. Tom al [phone].    We comply with applicable federal civil rights laws and [place] laws. We do not discriminate on the basis of race, color, national origin, age, disability, sex, sexual orientation, or gender identity.            Thank you!     Thank you for choosing [facility]  for your care. Our goal is always to provide you with excellent care. Hearing back from our patients is one way we can continue to improve our services. Please take a few minutes to complete the written survey that you may receive in the mail after your visit with us. Thank you!             Your Updated Medication List - Protect others around you: Learn how to safely use, store and throw away your medicines at www.disposemymeds.org.          This list is accurate as of 10/12/18 10:43 AM.  Always use your most recent med list.                   Brand Name Dispense Instructions for use Diagnosis    ACE/ARB/ARNI NOT PRESCRIBED (INTENTIONAL)      continuous prn. ACE & ARB not prescribed due to Other:BP controlled, no microalbuminuria        amiodarone 200 MG tablet    PACERONE/CODARONE     Take 400 mg by mouth daily        amLODIPine 2.5 MG tablet    NORVASC    90 tablet    Take 1 tablet (2.5 mg) by mouth daily    Essential hypertension       ASPIRIN NOT PRESCRIBED    INTENTIONAL    0 each    Please choose reason not prescribed, below    Essential hypertension with goal blood pressure less than 140/90       atorvastatin 10 MG tablet    LIPITOR    90 tablet    TAKE 1 TABLET(10 MG) BY MOUTH DAILY    Hyperlipidemia, unspecified hyperlipidemia type       CALCIUM CITRATE + D PO      1,200 mg daily        eszopiclone 3 MG tablet    LUNESTA    90 tablet    Take 1 tablet (3 mg) by mouth nightly as needed    Insomnia, unspecified  type       FLUoxetine 40 MG capsule    PROzac    90 capsule    Take 1 capsule (40 mg) by mouth daily    Mild major depression (H)       furosemide 20 MG tablet    LASIX     Take 40 mg by mouth daily        gabapentin 300 MG capsule    NEURONTIN    360 capsule    TAKE ONE CAPSULE BY MOUTH FOUR TIMES A DAY    Postherpetic neuralgia       lidocaine 5 % Patch    LIDODERM    30 patch    Apply up to 3 patches to painful area at once for up to 12 h within a 24 h period.  Remove after 12 hours.    Other chronic pain, Postherpetic neuralgia       Lutein 6 MG Tabs      Take  by mouth daily.        metoprolol succinate 25 MG 24 hr tablet    TOPROL-XL    90 tablet    Take 1 tablet (25 mg) by mouth daily    Essential hypertension with goal blood pressure less than 140/90       morphine 15 MG 12 hr tablet    MS CONTIN    30 tablet    Take 1 tablet (15 mg) by mouth daily    Other chronic pain       MULTI-VITAMIN PO      once daily        * nystatin cream    MYCOSTATIN    60 g    APPLY TOPICALLY DAILY AS NEEDED(RASH UNDER BREAST AND IN GROIN)    Intertrigo       * nystatin 013880 UNIT/GM Powd    NYSTOP    120 g    APPLY 1 DOSE TOPICALLY THREE TIMES DAILY AS NEEDED    Intertrigo       ondansetron 4 MG ODT tab    ZOFRAN-ODT     Take 4 mg by mouth every 8 hours as needed for nausea        oxyCODONE-acetaminophen 7.5-325 MG per tablet    PERCOCET    90 tablet    Take 1 tablet by mouth 3 times daily    Other chronic pain       ranitidine 150 MG tablet    ZANTAC     Take 150 mg by mouth daily        SENNA-GEN 8.6 MG tablet   Generic drug:  senna     120    2 TABLETS AT Twice daily    Unspecified constipation       spironolactone 25 MG tablet    ALDACTONE    20 tablet    Take 1 tablet (25 mg) by mouth daily    Paroxysmal atrial fibrillation (H)       tiZANidine 2 MG tablet    ZANAFLEX    90 tablet    Take 1 tablet (2 mg) by mouth At Bedtime    Kyphoscoliosis, Neck pain       triamcinolone 0.1 % cream    KENALOG    15 g    APPLY SPARINGLY  EXTERNALLY TO THE AFFECTED AREA THREE TIMES DAILY FOR 14 DAYS    Dermatitis       XARELTO 15 MG Tabs tablet   Generic drug:  rivaroxaban ANTICOAGULANT     20 tablet    Take 1 tablet (15 mg) by mouth daily    Paroxysmal atrial fibrillation (H)       * Notice:  This list has 2 medication(s) that are the same as other medications prescribed for you. Read the directions carefully, and ask your doctor or other care provider to review them with you.

## 2018-10-15 ENCOUNTER — TELEPHONE (OUTPATIENT)
Dept: FAMILY MEDICINE | Facility: OTHER | Age: 78
End: 2018-10-15

## 2018-10-15 NOTE — TELEPHONE ENCOUNTER
Prior Authorization Retail Medication Request    Medication/Dose: eszopiclone (LUNESTA) 3 MG tablet  ICD code (if different than what is on RX):    Previously Tried and Failed:    Rationale:      Insurance Name:    Insurance ID:        Pharmacy Information (if different than what is on RX)  Name:  [facility]   Phone:  [phone]

## 2018-10-16 NOTE — TELEPHONE ENCOUNTER
Prior Authorization Approval    Authorization Effective Date: 7/18/2018  Authorization Expiration Date: 10/16/2019  Medication: eszopiclone (LUNESTA) 3 MG-APPROVED  Approved Dose/Quantity:    Reference #:     Insurance Company: [employer] [phone] Fax [phone]  Expected CoPay:       CoPay Card Available:      Foundation Assistance Needed:    Which Pharmacy is filling the prescription (Not needed for infusion/clinic administered): [facility] [address] [place] [address] [address] AT [facility]  Pharmacy Notified: Yes  Patient Notified: Yes

## 2018-10-16 NOTE — TELEPHONE ENCOUNTER
Central Prior Authorization Team   Phone: [phone]    PA Initiation    Medication: eszopiclone (LUNESTA) 3 MG tablet  Insurance Company: [employer] - Phone [phone] Fax [phone]  Pharmacy Filling the Rx: [facility] [id number] [place] [address] [address] AT [facility]  Filling Pharmacy Phone: [phone]  Filling Pharmacy Fax: [phone]  Start Date: 10/16/2018

## 2018-10-19 ENCOUNTER — HOSPITAL ENCOUNTER (OUTPATIENT)
Dept: PHYSICAL THERAPY | Facility: CLINIC | Age: 78
Setting detail: THERAPIES SERIES
End: 2018-10-19
Attending: FAMILY MEDICINE
Payer: MEDICARE

## 2018-10-19 PROCEDURE — 97110 THERAPEUTIC EXERCISES: CPT | Mod: GP | Performed by: PHYSICAL THERAPIST

## 2018-10-19 PROCEDURE — G8979 MOBILITY GOAL STATUS: HCPCS | Mod: GP,CJ | Performed by: PHYSICAL THERAPIST

## 2018-10-19 PROCEDURE — 40000719 ZZHC STATISTIC PT DEPARTMENT NEURO VISIT: Performed by: PHYSICAL THERAPIST

## 2018-10-19 PROCEDURE — G8980 MOBILITY D/C STATUS: HCPCS | Mod: GP,CJ | Performed by: PHYSICAL THERAPIST

## 2018-10-19 PROCEDURE — 97112 NEUROMUSCULAR REEDUCATION: CPT | Mod: GP | Performed by: PHYSICAL THERAPIST

## 2018-10-20 NOTE — PROGRESS NOTES
Outpatient Physical Therapy Discharge Note     Patient: Katherin Jonas  : 1940    Beginning/End Dates of Reporting Period:  18 to 10/19/2018    Referring Provider: Silvia Gregory MD    Therapy Diagnosis: gait instability     Client Self Report: Patient fell in the elevator when she was going to acupuncture, no injuries happened, she thinks that she just lost her foot when she was turning. She brings walker in today- she does think she should use it more when she is walking out and about. She feels that she has plenty to work on at home with her exercises at home.       Goals:  Goal Identifier Strength   Goal Description Pt will demonstrate 5/5 B LE strength in order to progress IND and safety with mobility.    Target Date 10/19/18   Date Met  10/19/18   Progress:     Goal Identifier ROM- progressing, will need to continue working on stretching program    Goal Description Pt will demonstrate full hip and knee ROM B in order to assist with improved posture with ambulation.    Target Date 10/19/18   Date Met      Progress:     Goal Identifier Ambulation- posture has improved and patient showing better foot clearance when walking    Goal Description Pt will ambulate 200' with appropriate upright posture and step length/heel strike in order to demonstrate improved safety and form with ambulation.    Target Date 10/19/18   Date Met  10/19/18   Progress:     Goal Identifier Balance- not met, has improved her SLS stability but only for 2-3 secs at a time    Goal Description Pt will be able to complete SLS B for 8-10 seconds with no UE support in order to demonstrate improved balance and safety with mobility.    Target Date 10/19/18   Date Met      Progress:     Progress Toward Goals:   Patient has either met or is progressing toward all goals- patient with difficulty making large improvements with her balance and gait stability d/t posture related to scoliosis and arthritis. Was able to make some slight  changes. Recommend that patient use 4WW for walking outside of the house to prevent falls and continue working on her HEP. Patient will benefit from exercises but needs to be more consistent with these at home.    Plan:  Discharge from therapy.    Discharge:    Reason for Discharge: Patient has met all goals.    Equipment Issued: none, recommend she use her walker.    Discharge Plan: Patient to continue home program.

## 2018-10-31 NOTE — TELEPHONE ENCOUNTER
Called erika, she will give home care recommendations per nursing protocol.  Christy Sanchez MD     negative - no cough

## 2018-11-05 NOTE — PROGRESS NOTES
SUBJECTIVE:   Katherin Jonas is a 78 year old female who presents to clinic today for the following health issues:    HPI  Back Pain Follow Up      Description:   Location of pain:  bilateral  Character of pain: sharp, dull ache and stabbing  Pain radiation: into neck  Since last visit, pain is:  unchanged  New numbness or weakness in legs, not attributed to pain:  no     Intensity: Currently 3/10    History:   Pain interferes with job: Not applicable  Therapies tried without relief: Varies  Therapies tried with relief: opioids and Physical Therapy     She still has all over pain, especially in her back and neck, however, she feels her neck is worse.  She thinks she did better with the morphine twice daily.      Problem list and histories reviewed & adjusted, as indicated.  Additional history: as documented    Patient Active Problem List   Diagnosis     Esophageal reflux     Carotid atherosclerosis     CKD (chronic kidney disease) stage 3, GFR 30-59 ml/min (H)     Insomnia     Mild major depression (H)     Advanced directives, counseling/discussion     Essential hypertension     Hyperlipidemia, unspecified     Scoliosis     Osteopenia     Postherpetic neuralgia     Other chronic pain     COPD (chronic obstructive pulmonary disease) (H)     Paroxysmal atrial fibrillation (H)     Anxiety     Anticoagulant long-term use     Heart failure with preserved left ventricular function (HFpEF) (H)     Past Surgical History:   Procedure Laterality Date     C FULL ROUT OBSTE CARE, DELIV  1964     C FULL ROUT OBSTE CARE, DELIV       C FULL ROUT OBSTE CARE, DELIV       C TOTAL KNEE ARTHROPLASTY  1997    left     C TOTAL KNEE ARTHROPLASTY  2005    right     C TREAT ECTOPIC PREG,RMV TUBE/OVARY  1967    left     COLONOSCOPY  11    [facility]     HC REPAIR OF NASAL SEPTUM         Social History   Substance Use Topics     Smoking status: Former Smoker     Quit date:  1/1/1979     Smokeless tobacco: Never Used      Comment: no smokers in household     Alcohol use No      Comment: none since 2006     Family History   Problem Relation Age of Onset     Substance Abuse Father      Cancer Daughter      Depression Daughter      Hypertension Mother            ROS:  CONSTITUTIONAL: NEGATIVE for fever, chills, change in weight  ENT/MOUTH: NEGATIVE for ear, mouth and throat problems  RESP: NEGATIVE for significant cough or shortness of breath   CV: NEGATIVE for chest pain, palpitations or peripheral edema    OBJECTIVE:     /68  Pulse 56  Temp 97.6  F (36.4  C) (Temporal)  Resp 14  Wt 101 lb (45.8 kg)  LMP 02/01/2011  SpO2 99%  BMI 21.67 kg/m2  Body mass index is 21.67 kg/(m^2).  Gen: no apparent distress  Neck:  Kyphoscoliosis present, no spinous process tenderness, does have tenderness bilateral posterior strap muscles.  No overlying skin disruption  Psych: Alert and oriented times 3; coherent speech, normal   rate and volume, able to articulate logical thoughts, able   to abstract reason, no tangential thoughts, no hallucinations   or delusions  Her affect is neutral      ASSESSMENT/PLAN:     1. Other chronic pain  Will go back to prior regimen of MS Contin twice daily and decrease Percocet from 7.5 mg three times daily to 5 mg three times daily.  Follow up in 1 month.    - morphine (MS CONTIN) 15 MG 12 hr tablet; Take 1 tablet (15 mg) by mouth every 12 hours  Dispense: 60 tablet; Refill: 0  - oxyCODONE-acetaminophen (PERCOCET) 5-325 MG per tablet; Take 1 tablet by mouth every 8 hours  Dispense: 90 tablet; Refill: 0  - PHYSICAL THERAPY REFERRAL; Future    2. Neck pain  She feels neck is worse, she found massage of the muscles beneficial in the room, but states having a full massage makes her worse.  I asked her  to rub her neck a couple times a day.  Will also refer to Physical Therapy.  Suspect worsening kyphoscoliosis is affecting her neck pain.    - PHYSICAL  THERAPY REFERRAL; Future    Silvia Matthews MD  [facility]

## 2018-11-09 ENCOUNTER — OFFICE VISIT (OUTPATIENT)
Dept: FAMILY MEDICINE | Facility: OTHER | Age: 78
End: 2018-11-09
Payer: COMMERCIAL

## 2018-11-09 VITALS
SYSTOLIC BLOOD PRESSURE: 128 MMHG | HEART RATE: 56 BPM | WEIGHT: 101 LBS | BODY MASS INDEX: 21.67 KG/M2 | RESPIRATION RATE: 14 BRPM | TEMPERATURE: 97.6 F | OXYGEN SATURATION: 99 % | DIASTOLIC BLOOD PRESSURE: 68 MMHG

## 2018-11-09 DIAGNOSIS — M54.2 NECK PAIN: ICD-10-CM

## 2018-11-09 DIAGNOSIS — G89.29 OTHER CHRONIC PAIN: Primary | ICD-10-CM

## 2018-11-09 PROCEDURE — 99213 OFFICE O/P EST LOW 20 MIN: CPT | Performed by: FAMILY MEDICINE

## 2018-11-09 RX ORDER — OXYCODONE AND ACETAMINOPHEN 5; 325 MG/1; MG/1
1 TABLET ORAL EVERY 8 HOURS
Qty: 90 TABLET | Refills: 0 | Status: SHIPPED | OUTPATIENT
Start: 2018-11-09 | End: 2018-12-07

## 2018-11-09 RX ORDER — MORPHINE SULFATE 15 MG/1
15 TABLET, FILM COATED, EXTENDED RELEASE ORAL EVERY 12 HOURS
Qty: 60 TABLET | Refills: 0 | Status: SHIPPED | OUTPATIENT
Start: 2018-11-09 | End: 2018-12-07

## 2018-11-09 NOTE — MR AVS SNAPSHOT
After Visit Summary   11/9/2018    Katherin Jonas    MRN: [id number]           Patient Information     Date Of Birth          1940        Visit Information        Provider Department      11/9/2018 9:20 AM Silvia Matthews MD [facility]        Today's Diagnoses     Other chronic pain    -  1    Neck pain           Follow-ups after your visit        Additional Services     PHYSICAL THERAPY REFERRAL       If you have not heard from the scheduling office within 2 business days, please call [phone] for all locations, with the exception of [place], please call [phone] and [facility] [place], please call [phone].    Please be aware that coverage of these services is subject to the terms and limitations of your health insurance plan.  Call member services at your health plan with any benefit or coverage questions.                  Follow-up notes from your care team     Return in about 4 weeks (around 12/7/2018) for pain follow up.      Your next 10 appointments already scheduled     Dec 07, 2018  1:00 PM CST   Office Visit with Silvia Matthews MD   [facility]    [address] [place] [id number] [phone]           Bring a current list of meds and any records pertaining to this visit. For Physicals, please bring immunization records and any forms needing to be filled out. Please arrive 10 minutes early to complete paperwork.              Future tests that were ordered for you today     Open Future Orders        Priority Expected Expires Ordered    PHYSICAL THERAPY REFERRAL Routine  11/9/2019 11/9/2018            Who to contact     If you have questions or need follow up information about today's clinic visit or your schedule please contact [facility] directly at [phone].  Normal or non-critical lab and imaging results will be communicated to you by MyChart, letter or phone within 4  business days after the clinic has received the results. If you do not hear from us within 7 days, please contact the clinic through Ki[employer] or phone. If you have a critical or abnormal lab result, we will notify you by phone as soon as possible.  Submit refill requests through [employer] or call your pharmacy and they will forward the refill request to us. Please allow 3 business days for your refill to be completed.          Additional Information About Your Visit        Care EveryWhere ID     This is your Care EveryWhere ID. This could be used by other organizations to access your [place] medical records  [id number]        Your Vitals Were     Pulse Temperature Respirations Last Period Pulse Oximetry BMI (Body Mass Index)    56 97.6  F (36.4  C) (Temporal) 14 02/01/2011 99% 21.67 kg/m2       Blood Pressure from Last 3 Encounters:   11/09/18 144/64   10/12/18 120/52   09/14/18 128/64    Weight from Last 3 Encounters:   11/09/18 101 lb (45.8 kg)   10/12/18 102 lb (46.3 kg)   09/14/18 101 lb (45.8 kg)                 Today's Medication Changes          These changes are accurate as of 11/9/18  9:51 AM.  If you have any questions, ask your nurse or doctor.               Start taking these medicines.        Dose/Directions    oxyCODONE-acetaminophen 5-325 MG per tablet   Commonly known as:  PERCOCET   Used for:  Other chronic pain   Replaces:  oxyCODONE-acetaminophen 7.5-325 MG per tablet   Started by:  Silvia Matthews MD        Dose:  1 tablet   Take 1 tablet by mouth every 8 hours   Quantity:  90 tablet   Refills:  0         These medicines have changed or have updated prescriptions.        Dose/Directions    morphine 15 MG 12 hr tablet   Commonly known as:  MS CONTIN   This may have changed:  when to take this   Used for:  Other chronic pain   Changed by:  Silvia Matthews MD        Dose:  15 mg   Take 1 tablet (15 mg) by mouth every 12 hours   Quantity:  60 tablet   Refills:  0         Stop taking  these medicines if you haven't already. Please contact your care team if you have questions.     oxyCODONE-acetaminophen 7.5-325 MG per tablet   Commonly known as:  PERCOCET   Replaced by:  oxyCODONE-acetaminophen 5-325 MG per tablet   Stopped by:  Silvia Matthews MD                Where to get your medicines      Some of these will need a paper prescription and others can be bought over the counter.  Ask your nurse if you have questions.     Bring a paper prescription for each of these medications     morphine 15 MG 12 hr tablet    oxyCODONE-acetaminophen 5-325 MG per tablet               Information about OPIOIDS     PRESCRIPTION OPIOIDS: WHAT YOU NEED TO KNOW   We gave you an opioid (narcotic) pain medicine. It is important to manage your pain, but opioids are not always the best choice. You should first try all the other options your care team gave you. Take this medicine for as short a time (and as few doses) as possible.    Some activities can increase your pain, such as bandage changes or therapy sessions. It may help to take your pain medicine 30 to 60 minutes before these activities. Reduce your stress by getting enough sleep, working on hobbies you enjoy and practicing relaxation or meditation. Talk to your care team about ways to manage your pain beyond prescription opioids.    These medicines have risks:    DO NOT drive when on new or higher doses of pain medicine. These medicines can affect your alertness and reaction times, and you could be arrested for driving under the influence (DUI). If you need to use opioids long-term, talk to your care team about driving.    DO NOT operate heavy machinery    DO NOT do any other dangerous activities while taking these medicines.    DO NOT drink any alcohol while taking these medicines.     If the opioid prescribed includes acetaminophen, DO NOT take with any other medicines that contain acetaminophen. Read all labels carefully. Look for the word   acetaminophen  or  Tylenol.  Ask your pharmacist if you have questions or are unsure.    You can get addicted to pain medicines, especially if you have a history of addiction (chemical, alcohol or substance dependence). Talk to your care team about ways to reduce this risk.    All opioids tend to cause constipation. Drink plenty of water and eat foods that have a lot of fiber, such as fruits, vegetables, prune juice, apple juice and high-fiber cereal. Take a laxative (Miralax, milk of magnesia, Colace, Senna) if you don t move your bowels at least every other day. Other side effects include upset stomach, sleepiness, dizziness, throwing up, tolerance (needing more of the medicine to have the same effect), physical dependence and slowed breathing.    Store your pills in a secure place, locked if possible. We will not replace any lost or stolen medicine. If you don t finish your medicine, please throw away (dispose) as directed by your pharmacist. The [place] [employer]ution [employer] has more information about safe disposal: [email or web address]://[email or web address].[email or web address]-medications         Primary Care Provider Office Phone # Fax #    Silvia Matthews [phone]       [address] [place] [id number]        Equal Access to Services     SURINDER HUERTA : Gopi diazo Sojoelali, waaxda luqadaha, qaybta kaalmada adeegyada, martínez molina. So [facility] [phone].    ATENCIÓN: Si habla español, tiene a bernardo disposición servicios gratuitos de asistencia lingüística. Llame al [phone].    We comply with applicable federal civil rights laws and [place] laws. We do not discriminate on the basis of race, color, national origin, age, disability, sex, sexual orientation, or gender identity.            Thank you!     Thank you for choosing [facility]  for your care. Our goal is always to provide you with excellent care. Hearing back  from our patients is one way we can continue to improve our services. Please take a few minutes to complete the written survey that you may receive in the mail after your visit with us. Thank you!             Your Updated Medication List - Protect others around you: Learn how to safely use, store and throw away your medicines at www.disposemymeds.org.          This list is accurate as of 11/9/18  9:51 AM.  Always use your most recent med list.                   Brand Name Dispense Instructions for use Diagnosis    ACE/ARB/ARNI NOT PRESCRIBED (INTENTIONAL)      continuous prn. ACE & ARB not prescribed due to Other:BP controlled, no microalbuminuria        amiodarone 200 MG tablet    PACERONE/CODARONE     Take 400 mg by mouth daily        amLODIPine 2.5 MG tablet    NORVASC    90 tablet    Take 1 tablet (2.5 mg) by mouth daily    Essential hypertension       ASPIRIN NOT PRESCRIBED    INTENTIONAL    0 each    Please choose reason not prescribed, below    Essential hypertension with goal blood pressure less than 140/90       atorvastatin 10 MG tablet    LIPITOR    90 tablet    TAKE 1 TABLET(10 MG) BY MOUTH DAILY    Hyperlipidemia, unspecified hyperlipidemia type       CALCIUM CITRATE + D PO      1,200 mg daily        eszopiclone 3 MG tablet    LUNESTA    90 tablet    Take 1 tablet (3 mg) by mouth nightly as needed    Insomnia, unspecified type       FLUoxetine 40 MG capsule    PROzac    90 capsule    Take 1 capsule (40 mg) by mouth daily    Mild major depression (H)       furosemide 20 MG tablet    LASIX     Take 40 mg by mouth daily        gabapentin 300 MG capsule    NEURONTIN    360 capsule    TAKE ONE CAPSULE BY MOUTH FOUR TIMES A DAY    Postherpetic neuralgia       lidocaine 5 % Patch    LIDODERM    30 patch    Apply up to 3 patches to painful area at once for up to 12 h within a 24 h period.  Remove after 12 hours.    Other chronic pain, Postherpetic neuralgia       Lutein 6 MG Tabs      Take  by mouth daily.         metoprolol succinate 25 MG 24 hr tablet    TOPROL-XL    90 tablet    Take 1 tablet (25 mg) by mouth daily    Essential hypertension with goal blood pressure less than 140/90       morphine 15 MG 12 hr tablet    MS CONTIN    60 tablet    Take 1 tablet (15 mg) by mouth every 12 hours    Other chronic pain       MULTI-VITAMIN PO      once daily        * nystatin cream    MYCOSTATIN    60 g    APPLY TOPICALLY DAILY AS NEEDED(RASH UNDER BREAST AND IN GROIN)    Intertrigo       * nystatin 988913 UNIT/GM Powd    NYSTOP    120 g    APPLY 1 DOSE TOPICALLY THREE TIMES DAILY AS NEEDED    Intertrigo       ondansetron 4 MG ODT tab    ZOFRAN-ODT     Take 4 mg by mouth every 8 hours as needed for nausea        oxyCODONE-acetaminophen 5-325 MG per tablet    PERCOCET    90 tablet    Take 1 tablet by mouth every 8 hours    Other chronic pain       ranitidine 150 MG tablet    ZANTAC     Take 150 mg by mouth daily        SENNA-GEN 8.6 MG tablet   Generic drug:  senna     120    2 TABLETS AT Twice daily    Unspecified constipation       spironolactone 25 MG tablet    ALDACTONE    20 tablet    Take 1 tablet (25 mg) by mouth daily    Paroxysmal atrial fibrillation (H)       tiZANidine 2 MG tablet    ZANAFLEX    90 tablet    Take 1 tablet (2 mg) by mouth At Bedtime    Kyphoscoliosis, Neck pain       triamcinolone 0.1 % cream    KENALOG    15 g    APPLY SPARINGLY EXTERNALLY TO THE AFFECTED AREA THREE TIMES DAILY FOR 14 DAYS    Dermatitis       XARELTO 15 MG Tabs tablet   Generic drug:  rivaroxaban ANTICOAGULANT     20 tablet    Take 1 tablet (15 mg) by mouth daily    Paroxysmal atrial fibrillation (H)       * Notice:  This list has 2 medication(s) that are the same as other medications prescribed for you. Read the directions carefully, and ask your doctor or other care provider to review them with you.

## 2018-12-04 ENCOUNTER — TRANSFERRED RECORDS (OUTPATIENT)
Dept: HEALTH INFORMATION MANAGEMENT | Facility: CLINIC | Age: 78
End: 2018-12-04

## 2018-12-04 ENCOUNTER — MEDICAL CORRESPONDENCE (OUTPATIENT)
Dept: HEALTH INFORMATION MANAGEMENT | Facility: CLINIC | Age: 78
End: 2018-12-04

## 2018-12-04 NOTE — PROGRESS NOTES
SUBJECTIVE:   Katherin Jonas is a 78 year old female who presents to clinic today for the following health issues:      HPI  Back Pain Follow Up      Description:   Location of pain:  bilateral  Character of pain: sharp, dull ache, stabbing and gnawing  Pain radiation: into neck  Since last visit, pain is:  worsened  New numbness or weakness in legs, not attributed to pain:  YES    Intensity: Currently 5/10    History:   Pain interferes with job: Not applicable  Therapies tried without relief: varies  Therapies tried with relief: opioids and Physical Therapy     Patient has been on chronic narcotics for well over 15 years for chronic back pain due to kyphoscoliosis.  Her back specialist feels that any back surgery would be very extensive without much benefit and so she has been managed with narcotics.  I started seeing her about 5 years ago and she was on 120 MME at that time, she was able to be decreased to 105 MME and stayed at this dose for a few years and then less than a year ago, she was titrated down to 93.5 MME.  She then contracted influenza, was quite ill, was in and out of the hospital, had trouble keeping food down, lost weight, would not be able to take pills, went into narcotic withdrawals, stopped all pain meds for a little bit, developed CHF and throughout all of this last winter/spring and summer, her narcotics were continually adjusted until currently she is at 52 MME.  Overall, her pain is still about the same as it was when she was on higher doses.  Last visit we had changed her from 1 MS Contin daily with Percocet 7.5 mg three times daily back to MS Contin twice daily with Percocet 5 mg three times daily.  She feels she did better at the higher Percocet dose.  Discussed that I would prefer not to increase her MME any further.  She is unsure if the MS Contin lasts 12 hours or not.      Problem list and histories reviewed & adjusted, as indicated.  Additional history: as documented    Patient  Active Problem List   Diagnosis     Esophageal reflux     Carotid atherosclerosis     CKD (chronic kidney disease) stage 3, GFR 30-59 ml/min (H)     Insomnia     Mild major depression (H)     Advanced directives, counseling/discussion     Essential hypertension     Hyperlipidemia, unspecified     Scoliosis     Osteopenia     Postherpetic neuralgia     Other chronic pain     COPD (chronic obstructive pulmonary disease) (H)     Paroxysmal atrial fibrillation (H)     Anxiety     Anticoagulant long-term use     Heart failure with preserved left ventricular function (HFpEF) (H)     Past Surgical History:   Procedure Laterality Date     C FULL ROUT OBSTE CARE, DELIV  1964     C FULL ROUT OBSTE CARE, DELIV       C FULL ROUT OBSTE CARE, DELIV       C TOTAL KNEE ARTHROPLASTY  1997    left     C TOTAL KNEE ARTHROPLASTY  2005    right     C TREAT ECTOPIC PREG,RMV TUBE/OVARY      left     COLONOSCOPY  11    [facility] REPAIR OF NASAL SEPTUM         Social History   Substance Use Topics     Smoking status: Former Smoker     Quit date: 1979     Smokeless tobacco: Never Used      Comment: no smokers in household     Alcohol use No      Comment: none since      Family History   Problem Relation Age of Onset     Substance Abuse Father      Cancer Daughter      Depression Daughter      Hypertension Mother            ROS:  CONSTITUTIONAL: NEGATIVE for fever, chills, change in weight  RESP: NEGATIVE for significant cough or SOB  CV: NEGATIVE for chest pain, palpitations or peripheral edema    OBJECTIVE:     /64 (BP Location: Left arm, Patient Position: Chair, Cuff Size: Adult Regular)  Pulse 55  Temp 98.4  F (36.9  C) (Temporal)  Resp 14  Wt 105 lb 3.2 oz (47.7 kg)  LMP 2011  SpO2 99%  BMI 22.57 kg/m2  Body mass index is 22.57 kg/(m^2).  Gen: no apparent distress  Back:  Severe kyphoscoliosis noted  Psych: Alert and oriented times  3; coherent speech, normal   rate and volume, able to articulate logical thoughts, able   to abstract reason, no tangential thoughts, no hallucinations   or delusions  Her affect is neutral      ASSESSMENT/PLAN:     1. Other chronic pain  After much discussion, she is willing to go back to MS Contin once a day with Percocet 7.5 mg three times daily.  This is about 10% MME dose reduction.  If she does well on that dose, next month would consider stopping the MS Contin, but then increasing the Percocet to 4 times daily.    - morphine (MS CONTIN) 15 MG CR tablet; Take 1 tablet (15 mg) by mouth daily  Dispense: 30 tablet; Refill: 0  - oxyCODONE-acetaminophen (PERCOCET) 7.5-325 MG per tablet; Take 1 tablet by mouth 3 times daily  Dispense: 90 tablet; Refill: 0    Silvia Matthews MD  [facility]

## 2018-12-06 ENCOUNTER — MEDICAL CORRESPONDENCE (OUTPATIENT)
Dept: HEALTH INFORMATION MANAGEMENT | Facility: CLINIC | Age: 78
End: 2018-12-06

## 2018-12-07 ENCOUNTER — TRANSFERRED RECORDS (OUTPATIENT)
Dept: HEALTH INFORMATION MANAGEMENT | Facility: CLINIC | Age: 78
End: 2018-12-07

## 2018-12-07 ENCOUNTER — OFFICE VISIT (OUTPATIENT)
Dept: FAMILY MEDICINE | Facility: OTHER | Age: 78
End: 2018-12-07
Payer: COMMERCIAL

## 2018-12-07 VITALS
WEIGHT: 105.2 LBS | OXYGEN SATURATION: 99 % | RESPIRATION RATE: 14 BRPM | BODY MASS INDEX: 22.57 KG/M2 | DIASTOLIC BLOOD PRESSURE: 64 MMHG | TEMPERATURE: 98.4 F | SYSTOLIC BLOOD PRESSURE: 104 MMHG | HEART RATE: 55 BPM

## 2018-12-07 DIAGNOSIS — G89.29 OTHER CHRONIC PAIN: ICD-10-CM

## 2018-12-07 PROCEDURE — 99213 OFFICE O/P EST LOW 20 MIN: CPT | Performed by: FAMILY MEDICINE

## 2018-12-07 RX ORDER — MORPHINE SULFATE 15 MG/1
15 TABLET, FILM COATED, EXTENDED RELEASE ORAL DAILY
Qty: 30 TABLET | Refills: 0 | Status: SHIPPED | OUTPATIENT
Start: 2018-12-07 | End: 2019-01-11

## 2018-12-07 RX ORDER — OXYCODONE AND ACETAMINOPHEN 7.5; 325 MG/1; MG/1
1 TABLET ORAL 3 TIMES DAILY
Qty: 90 TABLET | Refills: 0 | Status: SHIPPED | OUTPATIENT
Start: 2018-12-07 | End: 2019-01-11

## 2018-12-07 ASSESSMENT — PATIENT HEALTH QUESTIONNAIRE - PHQ9
5. POOR APPETITE OR OVEREATING: SEVERAL DAYS
SUM OF ALL RESPONSES TO PHQ QUESTIONS 1-9: 2

## 2018-12-07 ASSESSMENT — ANXIETY QUESTIONNAIRES
5. BEING SO RESTLESS THAT IT IS HARD TO SIT STILL: NOT AT ALL
1. FEELING NERVOUS, ANXIOUS, OR ON EDGE: SEVERAL DAYS
3. WORRYING TOO MUCH ABOUT DIFFERENT THINGS: SEVERAL DAYS
GAD7 TOTAL SCORE: 5
6. BECOMING EASILY ANNOYED OR IRRITABLE: SEVERAL DAYS
7. FEELING AFRAID AS IF SOMETHING AWFUL MIGHT HAPPEN: NOT AT ALL
2. NOT BEING ABLE TO STOP OR CONTROL WORRYING: SEVERAL DAYS
IF YOU CHECKED OFF ANY PROBLEMS ON THIS QUESTIONNAIRE, HOW DIFFICULT HAVE THESE PROBLEMS MADE IT FOR YOU TO DO YOUR WORK, TAKE CARE OF THINGS AT HOME, OR GET ALONG WITH OTHER PEOPLE: NOT DIFFICULT AT ALL

## 2018-12-07 NOTE — MR AVS SNAPSHOT
After Visit Summary   12/7/2018    Katherin Jonas    MRN: [id number]           Patient Information     Date Of Birth          1940        Visit Information        Provider Department      12/7/2018 1:00 PM Silvia Matthews MD [facility]        Today's Diagnoses     Other chronic pain           Follow-ups after your visit        Follow-up notes from your care team     Return in about 4 weeks (around 1/4/2019) for pain follow up.      Your next 10 appointments already scheduled     Jan 11, 2019  1:00 PM CST   Office Visit with Silvia Matthews MD   [facility] ([facility])    [address]  [place] [id number] [phone]           Bring a current list of meds and any records pertaining to this visit. For Physicals, please bring immunization records and any forms needing to be filled out. Please arrive 10 minutes early to complete paperwork.              Who to contact     If you have questions or need follow up information about today's clinic visit or your schedule please contact [facility] directly at [phone].  Normal or non-critical lab and imaging results will be communicated to you by MyChart, letter or phone within 4 business days after the clinic has received the results. If you do not hear from us within 7 days, please contact the clinic through MyChart or phone. If you have a critical or abnormal lab result, we will notify you by phone as soon as possible.  Submit refill requests through [employer] or call your pharmacy and they will forward the refill request to us. Please allow 3 business days for your refill to be completed.          Additional Information About Your Visit        Care EveryWhere ID     This is your Care EveryWhere ID. This could be used by other organizations to access your [facility] medical records  [id number]        Your Vitals Were     Pulse Temperature Respirations Last  Period Pulse Oximetry BMI (Body Mass Index)    55 98.4  F (36.9  C) (Temporal) 14 02/01/2011 99% 22.57 kg/m2       Blood Pressure from Last 3 Encounters:   12/07/18 104/64   11/09/18 128/68   10/12/18 120/52    Weight from Last 3 Encounters:   12/07/18 105 lb 3.2 oz (47.7 kg)   11/09/18 101 lb (45.8 kg)   10/12/18 102 lb (46.3 kg)              Today, you had the following     No orders found for display         Today's Medication Changes          These changes are accurate as of 12/7/18  1:46 PM.  If you have any questions, ask your nurse or doctor.               Start taking these medicines.        Dose/Directions    oxyCODONE-acetaminophen 7.5-325 MG per tablet   Commonly known as:  PERCOCET   Used for:  Other chronic pain   Replaces:  oxyCODONE-acetaminophen 5-325 MG tablet   Started by:  Silvia Matthesw MD        Dose:  1 tablet   Take 1 tablet by mouth 3 times daily   Quantity:  90 tablet   Refills:  0         These medicines have changed or have updated prescriptions.        Dose/Directions    morphine 15 MG CR tablet   Commonly known as:  MS CONTIN   This may have changed:  when to take this   Used for:  Other chronic pain   Changed by:  Silvia Matthews MD        Dose:  15 mg   Take 1 tablet (15 mg) by mouth daily   Quantity:  30 tablet   Refills:  0         Stop taking these medicines if you haven't already. Please contact your care team if you have questions.     oxyCODONE-acetaminophen 5-325 MG tablet   Commonly known as:  PERCOCET   Replaced by:  oxyCODONE-acetaminophen 7.5-325 MG per tablet   Stopped by:  Silvia Matthews MD                Where to get your medicines      Some of these will need a paper prescription and others can be bought over the counter.  Ask your nurse if you have questions.     Bring a paper prescription for each of these medications     morphine 15 MG CR tablet    oxyCODONE-acetaminophen 7.5-325 MG per tablet               Information about OPIOIDS      PRESCRIPTION OPIOIDS: WHAT YOU NEED TO KNOW   We gave you an opioid (narcotic) pain medicine. It is important to manage your pain, but opioids are not always the best choice. You should first try all the other options your care team gave you. Take this medicine for as short a time (and as few doses) as possible.    Some activities can increase your pain, such as bandage changes or therapy sessions. It may help to take your pain medicine 30 to 60 minutes before these activities. Reduce your stress by getting enough sleep, working on hobbies you enjoy and practicing relaxation or meditation. Talk to your care team about ways to manage your pain beyond prescription opioids.    These medicines have risks:    DO NOT drive when on new or higher doses of pain medicine. These medicines can affect your alertness and reaction times, and you could be arrested for driving under the influence (DUI). If you need to use opioids long-term, talk to your care team about driving.    DO NOT operate heavy machinery    DO NOT do any other dangerous activities while taking these medicines.    DO NOT drink any alcohol while taking these medicines.     If the opioid prescribed includes acetaminophen, DO NOT take with any other medicines that contain acetaminophen. Read all labels carefully. Look for the word  acetaminophen  or  Tylenol.  Ask your pharmacist if you have questions or are unsure.    You can get addicted to pain medicines, especially if you have a history of addiction (chemical, alcohol or substance dependence). Talk to your care team about ways to reduce this risk.    All opioids tend to cause constipation. Drink plenty of water and eat foods that have a lot of fiber, such as fruits, vegetables, prune juice, apple juice and high-fiber cereal. Take a laxative (Miralax, milk of magnesia, Colace, Senna) if you don t move your bowels at least every other day. Other side effects include upset stomach, sleepiness, dizziness,  throwing up, tolerance (needing more of the medicine to have the same effect), physical dependence and slowed breathing.    Store your pills in a secure place, locked if possible. We will not replace any lost or stolen medicine. If you don t finish your medicine, please throw away (dispose) as directed by your pharmacist. The [place] Pollution [employer] has more information about safe disposal: [email or web address]-[email or web address]         Primary Care Provider Office Phone # Fax #    Silvia Matthews [phone]       [address]  [place] [address]        Equal Access to Services     PAMELA HUERTA : Hadii javier Ireland, waaxda aniket, qaelissa kaalmada mehran, martínez ramirez . So [facility] [phone].    ATENCIÓN: Si habla español, tiene a bernardo disposición servicios gratuitos de asistencia lingüística. Llame al [phone].    We comply with applicable federal civil rights laws and [place] laws. We do not discriminate on the basis of race, color, national origin, age, disability, sex, sexual orientation, or gender identity.            Thank you!     Thank you for choosing [facility]  for your care. Our goal is always to provide you with excellent care. Hearing back from our patients is one way we can continue to improve our services. Please take a few minutes to complete the written survey that you may receive in the mail after your visit with us. Thank you!             Your Updated Medication List - Protect others around you: Learn how to safely use, store and throw away your medicines at www.disposemymeds.org.          This list is accurate as of 12/7/18  1:46 PM.  Always use your most recent med list.                   Brand Name Dispense Instructions for use Diagnosis    ACE/ARB/ARNI NOT PRESCRIBED    INTENTIONAL     continuous prn. ACE & ARB not prescribed due to Other:BP controlled, no  microalbuminuria        amiodarone 200 MG tablet    PACERONE/CODARONE     Take 400 mg by mouth daily        amLODIPine 2.5 MG tablet    NORVASC    90 tablet    Take 1 tablet (2.5 mg) by mouth daily    Essential hypertension       ASPIRIN NOT PRESCRIBED    INTENTIONAL    0 each    Please choose reason not prescribed, below    Essential hypertension with goal blood pressure less than 140/90       atorvastatin 10 MG tablet    LIPITOR    90 tablet    TAKE 1 TABLET(10 MG) BY MOUTH DAILY    Hyperlipidemia, unspecified hyperlipidemia type       CALCIUM CITRATE + D PO      1,200 mg daily        eszopiclone 3 MG tablet    LUNESTA    90 tablet    Take 1 tablet (3 mg) by mouth nightly as needed    Insomnia, unspecified type       FLUoxetine 40 MG capsule    PROzac    90 capsule    Take 1 capsule (40 mg) by mouth daily    Mild major depression (H)       furosemide 20 MG tablet    LASIX     Take 40 mg by mouth daily        gabapentin 300 MG capsule    NEURONTIN    360 capsule    TAKE ONE CAPSULE BY MOUTH FOUR TIMES A DAY    Postherpetic neuralgia       lidocaine 5 % patch    LIDODERM    30 patch    Apply up to 3 patches to painful area at once for up to 12 h within a 24 h period.  Remove after 12 hours.    Other chronic pain, Postherpetic neuralgia       Lutein 6 MG Tabs      Take  by mouth daily.        metoprolol succinate ER 25 MG 24 hr tablet    TOPROL-XL    90 tablet    Take 1 tablet (25 mg) by mouth daily    Essential hypertension with goal blood pressure less than 140/90       morphine 15 MG CR tablet    MS CONTIN    30 tablet    Take 1 tablet (15 mg) by mouth daily    Other chronic pain       MULTI-VITAMIN PO      once daily        * nystatin 684059 UNIT/GM external cream    MYCOSTATIN    60 g    APPLY TOPICALLY DAILY AS NEEDED(RASH UNDER BREAST AND IN GROIN)    Intertrigo       * nystatin 477635 UNIT/GM external powder    NYSTOP    120 g    APPLY 1 DOSE TOPICALLY THREE TIMES DAILY AS NEEDED    Intertrigo        ondansetron 4 MG ODT tab    ZOFRAN-ODT     Take 4 mg by mouth every 8 hours as needed for nausea        oxyCODONE-acetaminophen 7.5-325 MG per tablet    PERCOCET    90 tablet    Take 1 tablet by mouth 3 times daily    Other chronic pain       ranitidine 150 MG tablet    ZANTAC     Take 150 mg by mouth daily        SENNA-GEN 8.6 MG tablet   Generic drug:  senna     120    2 TABLETS AT Twice daily    Unspecified constipation       spironolactone 25 MG tablet    ALDACTONE    20 tablet    Take 1 tablet (25 mg) by mouth daily    Paroxysmal atrial fibrillation (H)       tiZANidine 2 MG tablet    ZANAFLEX    90 tablet    Take 1 tablet (2 mg) by mouth At Bedtime    Kyphoscoliosis, Neck pain       triamcinolone 0.1 % external cream    KENALOG    15 g    APPLY SPARINGLY EXTERNALLY TO THE AFFECTED AREA THREE TIMES DAILY FOR 14 DAYS    Dermatitis       XARELTO 15 MG Tabs tablet   Generic drug:  rivaroxaban ANTICOAGULANT     20 tablet    Take 1 tablet (15 mg) by mouth daily    Paroxysmal atrial fibrillation (H)       * Notice:  This list has 2 medication(s) that are the same as other medications prescribed for you. Read the directions carefully, and ask your doctor or other care provider to review them with you.

## 2018-12-08 ASSESSMENT — ANXIETY QUESTIONNAIRES: GAD7 TOTAL SCORE: 5

## 2018-12-19 ENCOUNTER — TRANSFERRED RECORDS (OUTPATIENT)
Dept: HEALTH INFORMATION MANAGEMENT | Facility: CLINIC | Age: 78
End: 2018-12-19

## 2019-01-04 NOTE — PROGRESS NOTES
SUBJECTIVE:   Katherin Jonas is a 78 year old female who presents to clinic today for the following health issues:    HPI  Back Pain Follow Up      Description:   Location of pain:  center  Character of pain: dull ache  Pain radiation: into neck  Since last visit, pain is:  unchanged  New numbness or weakness in legs, not attributed to pain:  no     Intensity: Currently /10    History:   Pain interferes with job: Not applicable  Therapies tried without relief: Varies  Therapies tried with relief: opioids and Physical Therapy     Problem list and histories reviewed & adjusted, as indicated.  Additional history: as documented    Patient Active Problem List   Diagnosis     Esophageal reflux     Carotid atherosclerosis     CKD (chronic kidney disease) stage 3, GFR 30-59 ml/min (H)     Insomnia     Mild major depression (H)     Advanced directives, counseling/discussion     Essential hypertension     Hyperlipidemia, unspecified     Scoliosis     Osteopenia     Postherpetic neuralgia     Other chronic pain     COPD (chronic obstructive pulmonary disease) (H)     Paroxysmal atrial fibrillation (H)     Anxiety     Anticoagulant long-term use     Heart failure with preserved left ventricular function (HFpEF) (H)     Past Surgical History:   Procedure Laterality Date     C FULL ROUT OBSTE CARE, DELIV  1964     C FULL ROUT OBSTE CARE, DELIV       C FULL ROUT OBSTE CARE, DELIV  1970     C TOTAL KNEE ARTHROPLASTY  1997    left     C TOTAL KNEE ARTHROPLASTY  2005    right     C TREAT ECTOPIC PREG,RMV TUBE/OVARY  1967    left     COLONOSCOPY  11    [facility] REPAIR OF NASAL SEPTUM         Social History     Tobacco Use     Smoking status: Former Smoker     Last attempt to quit: 1979     Years since quittin.0     Smokeless tobacco: Never Used     Tobacco comment: no smokers in household   Substance Use Topics     Alcohol use: No     Comment: none  "since 2006     Family History   Problem Relation Age of Onset     Substance Abuse Father      Cancer Daughter      Depression Daughter      Hypertension Mother            ROS:  CONSTITUTIONAL: NEGATIVE for fever, chills, change in weight  ENT/MOUTH: NEGATIVE for ear, mouth and throat problems  RESP: NEGATIVE for significant cough or shortness of breath   CV: NEGATIVE for chest pain, palpitations or peripheral edema    OBJECTIVE:     /54 (BP Location: Right arm, Patient Position: Chair, Cuff Size: Adult Regular)   Pulse 56   Temp 98.7  F (37.1  C) (Temporal)   Resp 14   Wt 47 kg (103 lb 9.6 oz)   LMP 02/01/2011   SpO2 98%   BMI 22.22 kg/m    Body mass index is 22.22 kg/m .  Gen: no apparent distress  NECK: no adenopathy, no asymmetry, no masses  Chest: clear to auscultation without wheeze, rale or rhonchi  Cor: regular rate and rhythm without murmur  ABDOMEN: soft, nontender, no masses and bowel sounds normal  Back:  Marked kyphoscoliosis  Ext: warm and dry without edema  Psych: Alert and oriented times 3; coherent speech, normal   rate and volume, able to articulate logical thoughts, able   to abstract reason, no tangential thoughts, no hallucinations   or delusions  Her affect is neutral     ASSESSMENT/PLAN:     1. Other chronic pain  She feels that the Percocet 7.5 mg three times daily  works better for her pain than the 5 mg tablets.  She would like to increase her MS Contin to twice daily, although she only feels that the MS Contin works for a couple hours.  Discussed that if we did that, I want to decrease her Percocet back to 5 mg three times daily.  Otherwise, I would recommend stopping the MS Contin and increasing the Percocet to 7.5 mg four times daily.  She declines, stating that she thinks that much Percocet would make her \"goofy.\"  In the end, we decided to keep her dosing the same and follow up in 2 months.    - oxyCODONE-acetaminophen (PERCOCET) 7.5-325 MG per tablet; Take 1 tablet by " mouth 3 times daily  Dispense: 90 tablet; Refill: 0  - morphine (MS CONTIN) 15 MG CR tablet; Take 1 tablet (15 mg) by mouth daily  Dispense: 30 tablet; Refill: 0    2. Paroxysmal atrial fibrillation (H)  Saw Cardiology last month, continues on Xarelto and metoprolol    3. Mild major depression (H)  Stable, remains on Prozac.    4. Heart failure with preserved left ventricular function (HFpEF) (H)  Euvolemic, follows with Cardiology     5. Chronic obstructive pulmonary disease, unspecified COPD type (H)  Stable      Silvia Matthews MD  [facility]

## 2019-01-11 ENCOUNTER — OFFICE VISIT (OUTPATIENT)
Dept: FAMILY MEDICINE | Facility: OTHER | Age: 79
End: 2019-01-11
Payer: COMMERCIAL

## 2019-01-11 VITALS
SYSTOLIC BLOOD PRESSURE: 132 MMHG | TEMPERATURE: 98.7 F | DIASTOLIC BLOOD PRESSURE: 54 MMHG | HEART RATE: 56 BPM | BODY MASS INDEX: 22.22 KG/M2 | OXYGEN SATURATION: 98 % | WEIGHT: 103.6 LBS | RESPIRATION RATE: 14 BRPM

## 2019-01-11 DIAGNOSIS — G89.29 OTHER CHRONIC PAIN: ICD-10-CM

## 2019-01-11 DIAGNOSIS — I50.30 HEART FAILURE WITH PRESERVED LEFT VENTRICULAR FUNCTION (HFPEF) (H): ICD-10-CM

## 2019-01-11 DIAGNOSIS — I48.0 PAROXYSMAL ATRIAL FIBRILLATION (H): ICD-10-CM

## 2019-01-11 DIAGNOSIS — J44.9 CHRONIC OBSTRUCTIVE PULMONARY DISEASE, UNSPECIFIED COPD TYPE (H): ICD-10-CM

## 2019-01-11 DIAGNOSIS — F32.0 MILD MAJOR DEPRESSION (H): ICD-10-CM

## 2019-01-11 PROCEDURE — 99214 OFFICE O/P EST MOD 30 MIN: CPT | Performed by: FAMILY MEDICINE

## 2019-01-11 RX ORDER — MORPHINE SULFATE 15 MG/1
15 TABLET, FILM COATED, EXTENDED RELEASE ORAL DAILY
Qty: 30 TABLET | Refills: 0 | Status: SHIPPED | OUTPATIENT
Start: 2019-01-11 | End: 2019-01-11

## 2019-01-11 RX ORDER — OXYCODONE AND ACETAMINOPHEN 7.5; 325 MG/1; MG/1
1 TABLET ORAL 3 TIMES DAILY
Qty: 90 TABLET | Refills: 0 | Status: SHIPPED | OUTPATIENT
Start: 2019-02-11 | End: 2019-03-08

## 2019-01-11 RX ORDER — OXYCODONE AND ACETAMINOPHEN 7.5; 325 MG/1; MG/1
1 TABLET ORAL 3 TIMES DAILY
Qty: 90 TABLET | Refills: 0 | Status: SHIPPED | OUTPATIENT
Start: 2019-01-11 | End: 2019-01-11

## 2019-01-11 RX ORDER — MORPHINE SULFATE 15 MG/1
15 TABLET, FILM COATED, EXTENDED RELEASE ORAL DAILY
Qty: 30 TABLET | Refills: 0 | Status: SHIPPED | OUTPATIENT
Start: 2019-02-11 | End: 2019-03-08 | Stop reason: ALTCHOICE

## 2019-01-11 ASSESSMENT — PAIN SCALES - GENERAL: PAINLEVEL: MODERATE PAIN (4)

## 2019-02-12 NOTE — TELEPHONE ENCOUNTER
FUTURE VISIT INFORMATION      FUTURE VISIT INFORMATION:    Date: 2/15/19    Time: 10:50AM    [place] [facility]  REFERRAL INFORMATION:    Referring provider:  Chidi Jimenez MD    Referring providers clinic:  [facility] [place]    Reason for visit/diagnosis  Hoarseness, Vocal fold Paralysis    RECORDS REQUESTED FROM:       Clinic name Comments Records Status Imaging Status   [facility] 12/4/18, 9/11/18, 6/21/18, notes with Dr Jimenez Scanned in [facility] Speech [facility]ology [facility]   10/9/18, 10/30/18, 11/6/18, 11/13/18, 11/27/18 notes with Tika Karimi SLP Care Everywhere    [facility]   8/17/18 FLEXIBLE LARYNGOSCOPY WITH LEFT CYMETRA INJECTION TRUE VOCAL FOLD   With Dr Jimenez Care Everywhere    [facility]   4/17/07 notes with Dr Licha Walker Care Everywhere    [facility]   8/2/18 XR CHEST    8/2/18-8/4/18 hosp notes  Report: care Everywhere            2/12/19 9:20AM called [facility] referrals if records were sent there. Unable to locate records, sending a request to [facility] to release records via [employer]ax - amay

## 2019-02-15 ENCOUNTER — OFFICE VISIT (OUTPATIENT)
Dept: OTOLARYNGOLOGY | Facility: CLINIC | Age: 79
End: 2019-02-15

## 2019-02-15 ENCOUNTER — PRE VISIT (OUTPATIENT)
Dept: OTOLARYNGOLOGY | Facility: CLINIC | Age: 79
End: 2019-02-15

## 2019-02-15 DIAGNOSIS — R49.0 DYSPHONIA: Primary | ICD-10-CM

## 2019-02-15 DIAGNOSIS — I65.29 CAROTID ATHEROSCLEROSIS, UNSPECIFIED LATERALITY: ICD-10-CM

## 2019-02-15 DIAGNOSIS — J38.00 VOCAL CORD PARALYSIS: Primary | ICD-10-CM

## 2019-02-15 DIAGNOSIS — J38.01 UNILATERAL VOCAL FOLD PARALYSIS: ICD-10-CM

## 2019-02-15 DIAGNOSIS — R49.0 DYSPHONIA: ICD-10-CM
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
## 2019-02-25 ENCOUNTER — TELEPHONE (OUTPATIENT)
Dept: FAMILY MEDICINE | Facility: OTHER | Age: 79
End: 2019-02-25

## 2019-02-25 NOTE — TELEPHONE ENCOUNTER
Reason for Call:  Form, our goal is to have forms completed with 72 hours, however, some forms may require a visit or additional information.    Type of letter, form or note:  medical    Who is the form from?: [facility] medical (if other please explain)    Where did the form come from: form was faxed in    What clinic location was the form placed at?: [facility] ([facility] - [phone]    Where the form was placed: 's Box    What number is listed as a contact on the form?: [phone]       Additional comments: none    Call taken on 2/25/2019 at 12:48 PM by Gina Lizama

## 2019-03-01 NOTE — PROGRESS NOTES
SUBJECTIVE:   Katherin Jonas is a 78 year old female who presents to clinic today for the following health issues:    HPI  Back Pain Follow Up      Description:   Location of pain:  center  Character of pain: dull ache  Pain radiation: into neck  Since last visit, pain is:  unchanged  New numbness or weakness in legs, not attributed to pain:  no     Intensity: Currently 3/10    History:   Pain interferes with job: Not applicable  Therapies tried without relief: Varies  Therapies tried with relief: opioids and Physical Therapy     Problem list and histories reviewed & adjusted, as indicated.  Additional history: as documented    Patient Active Problem List   Diagnosis     Esophageal reflux     Carotid atherosclerosis     CKD (chronic kidney disease) stage 3, GFR 30-59 ml/min (H)     Insomnia     Mild major depression (H)     Advanced directives, counseling/discussion     Essential hypertension     Hyperlipidemia, unspecified     Scoliosis     Osteopenia     Postherpetic neuralgia     Other chronic pain     COPD (chronic obstructive pulmonary disease) (H)     Paroxysmal atrial fibrillation (H)     Anxiety     Anticoagulant long-term use     Heart failure with preserved left ventricular function (HFpEF) (H)     Past Surgical History:   Procedure Laterality Date     C FULL ROUT OBSTE CARE, DELIV  1964     C FULL ROUT OBSTE CARE, DELIV       C FULL ROUT OBSTE CARE, DELIV  1970     C TOTAL KNEE ARTHROPLASTY  1997    left     C TOTAL KNEE ARTHROPLASTY  2005    right     C TREAT ECTOPIC PREG,RMV TUBE/OVARY  1967    left     COLONOSCOPY  11    [facility] REPAIR OF NASAL SEPTUM         Social History     Tobacco Use     Smoking status: Former Smoker     Last attempt to quit: 1979     Years since quittin.2     Smokeless tobacco: Never Used     Tobacco comment: no smokers in household   Substance Use Topics     Alcohol use: No     Comment: none  since 2006     Family History   Problem Relation Age of Onset     Substance Abuse Father      Cancer Daughter      Depression Daughter      Hypertension Mother            ROS:  CONSTITUTIONAL: NEGATIVE for fever, chills, change in weight  ENT/MOUTH: NEGATIVE for ear, mouth and throat problems  RESP: NEGATIVE for significant cough or shortness of breath  CV: NEGATIVE for chest pain, palpitations or peripheral edema    OBJECTIVE:     /60 (BP Location: Right arm, Patient Position: Chair, Cuff Size: Adult Regular)   Pulse 50   Temp 98.1  F (36.7  C) (Temporal)   Resp 14   Wt 46.4 kg (102 lb 6.4 oz)   LMP 02/01/2011   SpO2 97%   BMI 21.97 kg/m    Body mass index is 21.97 kg/m .  Gen: no apparent distress  Chest/CV: S1 and S2 normal, no murmurs, clicks, gallops or rubs. Regular rate and rhythm. Chest is clear; no wheezes or rales.  Trace edema bilaterally  Back: Severe kyphoscoliosis noted and unchanged.  Psych: Alert and oriented times 3; coherent speech, normal   rate and volume, able to articulate logical thoughts, able   to abstract reason, no tangential thoughts, no hallucinations   or delusions  Her affect is neutral    ASSESSMENT/PLAN:       ICD-10-CM    1. Other chronic pain G89.29 oxyCODONE-acetaminophen (PERCOCET) 7.5-325 MG per tablet     DISCONTINUED: oxyCODONE-acetaminophen (PERCOCET) 7.5-325 MG per tablet     DISCONTINUED: oxyCODONE-acetaminophen (PERCOCET) 7.5-325 MG per tablet   2. Scoliosis, unspecified scoliosis type, unspecified spinal region M41.9        Patient has long-term chronic back pain.  She has been followed by back specialist who has not recommended any further surgery.  She had been on very high-dose of narcotics for years and has been slowly weaning down over the last several years.  She is asking for an increase of her pain medication, however her function and her pain levels appear to be unchanged even with the recent changes.  We discussed stopping her once daily MS Contin  and adding fourth Percocet.  She feels this is reasonable.  Therefore will stop the MS Contin and change her Percocet 7.5/325 from 3 times a day to 4 times a day.  This gives her an MEDD of 45 mg a day.  We discussed following up in 1 month, however she prefers to give it 3 months.  Therefore 3 months prescriptions were given to her and she will follow-up in 3 months.    Silvia Matthews MD  [facility]

## 2019-03-04 ENCOUNTER — OFFICE VISIT (OUTPATIENT)
Dept: OTOLARYNGOLOGY | Facility: CLINIC | Age: 79
End: 2019-03-04

## 2019-03-04 DIAGNOSIS — R49.0 DYSPHONIA: ICD-10-CM

## 2019-03-04 DIAGNOSIS — J38.01 UNILATERAL VOCAL CORD PARALYSIS: Primary | ICD-10-CM

## 2019-03-04 NOTE — PROGRESS NOTES
"[email or web address]      Has more strength now, but is still better in the morning and then deteriorates.  Chant who phrases on spacious speech    Start with 2x/day and then maybe 3 later.    Words in the morning.  Breathe and speak marcella linda lainet    After Visit Summary    Patient: Katherin Jonas  Date of Visit: 3/4/2019    Breathing:    In the morning and evening (twice daily) for 2minutes:   o Breathe while seated - Hands on tummy and chest.  Take a breath in with rounded lips and exhale with a  sh    o Try to keep shoulders down while inhaling and neck relaxed.  o Throughout the day (2-3x/day for just a couple minutes) check breathing while keeping shoulders relaxed (ie: riding to and from work, etc.)    Voice (2-3x/day unless otherwise noted):    Semi-occluded vocal tract exercise:   o Bubbles (straw in 1 to 1.5  of water) 2x/day (work up to 3x) for 1-2 min:  o 10x: blow 10-15 seconds with no voice and keep bubbles consistent.  o 5x: blow bubbles and add a sustained  who  or an  oo  (\"humming through the straw\")        Spacious speech phrases  (2-3x per day)  o Second column - H+ vowel combinations   o First column  o Chanting  With the exercises - so, stay on one pitch    : Sharon \"Horace\" Ha [phone]    Britney Gaming M.M. (voice), M.A., CCC/SLP  Speech-Language Pathologist  Certificate of Vocology  [facility]  [phone]  [email or web address]  Prounouns: she/her    "

## 2019-03-04 NOTE — LETTER
"3/4/2019       RE: Katherin Jonas  [address] [address] [place] [id number]     Dear Colleague,    Thank you for referring your patient, Katherin Jonas, to the [facility]. Please see a copy of my visit note below.      [facility] VOICE CLINIC  THERAPY NOTE (CPT 19902)    Patient: Katherin Jonas  Date of Service: 3/4/2019  Referring physician: Dr. Hernandez  Impressions from most recent evaluation (2/15/19).  She was evaluated by Mr. Jacky Wallace at that time.  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  Laryngeal evaluation demonstrates left vocal fold immobility with concavity of the left vibratory margin and visibly reduced tone.  That said the patient is able to achieve adequate glottic closure, and clear voicing was appreciated transiently throughout the evaluation, and with therapeutic probes.  Although there are structural limitations, it is clear that the patient has not yet maximized the function of her current physiology.\"    I had the pleasure of seeing Ms. Jonas today, for the 1st  therapy session (CPT code 08341).  She was joined by her , Finn.     SUBJECTIVE:  Since her last session, Ms. Jonas reports the following:     Overall she reports that symptoms are about the same, which is understandable, as no therapy was provided.    She rates her voice quality on a scale of 1-10 6 (10=best).    roughness, used to be higher in pitch and \"squeeky\" as her norm    just wants a voice to communicate effectively    Could only speak with a whisper at the time of onset for 4-1/2 months before the procedure.     OBJECTIVE:  Ms. Jonas presents today with the following:  Voice quality:    Mild to moderate roughness    No " significant breathiness    BREATHING:    Clavicular with neck and shoulder involvement.      Was on Breo for a while with no significant side effects; was helpful at the time.  Has not used any since August.  They do not feel that the breathlessness was associated with the heart.     Her  noted that her scoliosis and kyphosis seems to also restrict her lung capacity.     THROAT ISSUES    Denies significant issues    LARYNGEAL FUNCTION STUDIES  Laryngeal Function Studies (CPT 20764)     Acoustic Measures     Protocol / Parameter = result          Moderately rough, breathy and strained               Aerodynamic Measures     Protocol / Parameter Result Normative Mean (SD)   Vital Capacity     Expiratory Volume 62.73 dB 79.89 (5.47) dB   Comfortable Sustained Phonation     Mean SPL 72.54 dB  () dB   Mean Pitch 2.14 Sec  () Sec   Peak Expiratory Airflow -1.13 Liters  () Liters   Mean Expiratory Airflow    ()    Running Speech: All Voiced Stimulus     Mean SPL -1.82 Liters    Mean Pitch      Peak Expiratory Airflow      Mean Expiratory Airflow      Mean Airflow During Voicing      Running Speech: Grandfather Passage     Mean SPL      SPL Range      Mean Pitch      Pitch Range      Peak Expiratory Airflow      Mean Expiratory Airflow      Expiratory Volume      Mean Airflow During Voicing      Peak Inspiratory Airflow      Inspiratory Volume            PATIENT REPORTED MEASURES:  Patient Supplied Answers To SLP QOL Questionnaire  Therapy Quality of Life 3/4/2019   Since my l ast session, I used the speech therapy exercises and strategies as recommended by my speech pathologist. Not applicable   I feel that using my therapy techniques has become a habit Not applicable   I feel confident in my ability to manage my current and future symptoms. Agree   Since my last session I feel my symptoms have --------. Stayed the same   Overall, since starting therapy I feel my symptoms are --------. About the same     THERAPEUTIC  "ACTIVITIES    Asked many questions about the nature of her symptoms, and I answered all of these thoroughly.    Exercises to promote optimal respiratory mechanics    I provided explanation of the anatomy and physiology of respiration for speech and singing; she found this to be helpful    she demonstrated clavicular/neck/shoulder involvement in inhalation    Demonstrated difficulty allowing abdominal relaxation for inhalation    Practiced in a seated (lying on her back is not comfortable with her scoliosis; lying on her side is ok).    With clinician support, patient was able to demonstrate improved abdominal relaxation and engagement on inhalation    Optimal exhalation using inward engagement of the abdominal wall with no corresponding collapse of the upper chest cavity was trained using the pulsed \"sh\" task    Rescue breathing strategies using oral configurations to promote improved vocal fold abduction were instructed    Patient was able to demonstrate use of these techniques in combination with low respiratory engagement     A plan for when and how to implement these strategies was developed, and the patient was encouraged to practice the techniques independent of distress two times daily to habituate their use.    acceptable improvement in airflow and respiratory mechanics    Semi-Occluded Vocal Tract (SOVT) exercises instructed to reduce laryngeal tension, promote vocal fold pliability, and coordinate respiration and phonation    Straw phonation with water resistance was found to be most facilitating; she developed a new understanding, as she has worked with this in the past.    Initially focused on improving breath flow with the use of water resistance; this was helpful and she reported improved awareness of the imbalance in her breath coordination, as well as poor ability to sustain.      Sustained phonation, and voice vs. voiceless productions used to promote easy voicing and raise awareness of laryngeal " "tension.     Ascending and descending glides utilized to promote vocal fold pliability    \"Messa di voce\", gradual crescendo and decrescendo to vary medial compression was also utilized to promote vocal fold pliability.    Instructed on the benefits of using these exercises for improved coordination of breath flow with phonation and tissue mobilization.    Exercises in techniques for improved airflow during phonation    Speech material with /ju/ glides and aspirate onsets was facilitating at the word level.    Progressed to easy onset/ flow phrases    Chanting was most facilitating today; she also preferred not to vary her pitch in an exaggerated manner.    Adequate progress, as her rough voice quality was inconsistently minimized, and never fully resolved.    Counseling and Education:    Asked many questions about the nature of her symptoms, and I answered all of these thoroughly.    I provided a AVS by email and handouts of today's therapeutic activities to facilitate practice.    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 Ms. Jonas demonstrated adequate progress today.   Her rough voice quality was inconsistently minimized, and never fully resolved during most of the therapeutic activities.  We will continue to work to achieve her goals.  Laryngeal function studies were completed and also demonstrated significant limitations to her breath.    PLAN: I will see Ms. Jonas in 2-3 weeks, at which point we will continue therapy.   For practice goals see AVS.     TOTAL SERVICE TIME: 75 minutes  TREATMENT (07027)  LARYNGEAL FUNCTION STUDIES (50699)  NO CHARGE FACILITY FEE (80360)    Britney Gaming M.M. (voice), M.A., CCC/SLP  Speech-Language Pathologist  Certificate of Vocology  [facility] " "[facility]  [phone]  [email or web address]  Prounouns: she/her    [email or web address]      Has more strength now, but is still better in the morning and then deteriorates.  Chant who phrases on spacious speech    Start with 2x/day and then maybe 3 later.    Words in the morning.  Breathe and speak ewith a chant    After Visit Summary    Patient: Katherin Jonas  Date of Visit: 3/4/2019    Breathing:    In the morning and evening (twice daily) for 2minutes:   o Breathe while seated - Hands on tummy and chest.  Take a breath in with rounded lips and exhale with a  sh    o Try to keep shoulders down while inhaling and neck relaxed.  o Throughout the day (2-3x/day for just a couple minutes) check breathing while keeping shoulders relaxed (ie: riding to and from work, etc.)    Voice (2-3x/day unless otherwise noted):    Semi-occluded vocal tract exercise:   o Bubbles (straw in 1 to 1.5  of water) 2x/day (work up to 3x) for 1-2 min:  o 10x: blow 10-15 seconds with no voice and keep bubbles consistent.  o 5x: blow bubbles and add a sustained  who  or an  oo  (\"humming through the straw\")        Spacious speech phrases  (2-3x per day)  o Second column - H+ vowel combinations   o First column  o Chanting  With the exercises - so, stay on one pitch    : Sharon \"Horace\" Ha [phone]/ [email or web address]    Britney Gaming M.M. (voice) MEvelynA., CCC/SLP  Speech-Language Pathologist  Certificate of Vocology  [facility]  [phone]  [email or web address]  Prounouns: she/her    "

## 2019-03-04 NOTE — PROGRESS NOTES
"  [facility] VOICE CLINIC  THERAPY NOTE (CPT 84084)    Patient: Katherin Jonas  Date of Service: 3/4/2019  Referring physician: Dr. Hernandez  Impressions from most recent evaluation (2/15/19).  She was evaluated by Mr. Jacky Wallace at that time.  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  Laryngeal evaluation demonstrates left vocal fold immobility with concavity of the left vibratory margin and visibly reduced tone.  That said the patient is able to achieve adequate glottic closure, and clear voicing was appreciated transiently throughout the evaluation, and with therapeutic probes.  Although there are structural limitations, it is clear that the patient has not yet maximized the function of her current physiology.\"    I had the pleasure of seeing Ms. Jonas today, for the 1st  therapy session (CPT code 98191).  She was joined by her , Finn.     SUBJECTIVE:  Since her last session, Ms. Jonas reports the following:     Overall she reports that symptoms are about the same, which is understandable, as no therapy was provided.    She rates her voice quality on a scale of 1-10 6 (10=best).    roughness, used to be higher in pitch and \"squeeky\" as her norm    just wants a voice to communicate effectively    Could only speak with a whisper at the time of onset for 4-1/2 months before the procedure.     OBJECTIVE:  Ms. Jonas presents today with the following:  Voice quality:    Mild to moderate roughness    No significant breathiness    BREATHING:    Clavicular with neck and shoulder involvement.      Was on Breo for a while with no significant side effects; was helpful at the time.  Has not used any since August.  They do not feel that the breathlessness was associated with the " heart.     Her  noted that her scoliosis and kyphosis seems to also restrict her lung capacity.     THROAT ISSUES    Denies significant issues    LARYNGEAL FUNCTION STUDIES  Laryngeal Function Studies (CPT 03531)     Acoustic Measures     Protocol / Parameter = result          Moderately rough, breathy and strained               Aerodynamic Measures     Protocol / Parameter Result Normative Mean (SD)   Vital Capacity     Expiratory Volume 62.73 dB 79.89 (5.47) dB   Comfortable Sustained Phonation     Mean SPL 72.54 dB  () dB   Mean Pitch 2.14 Sec  () Sec   Peak Expiratory Airflow -1.13 Liters  () Liters   Mean Expiratory Airflow    ()    Running Speech: All Voiced Stimulus     Mean SPL -1.82 Liters    Mean Pitch      Peak Expiratory Airflow      Mean Expiratory Airflow      Mean Airflow During Voicing      Running Speech: Grandfather Passage     Mean SPL      SPL Range      Mean Pitch      Pitch Range      Peak Expiratory Airflow      Mean Expiratory Airflow      Expiratory Volume      Mean Airflow During Voicing      Peak Inspiratory Airflow      Inspiratory Volume            PATIENT REPORTED MEASURES:  Patient Supplied Answers To SLP QOL Questionnaire  Therapy Quality of Life 3/4/2019   Since my l ast session, I used the speech therapy exercises and strategies as recommended by my speech pathologist. Not applicable   I feel that using my therapy techniques has become a habit Not applicable   I feel confident in my ability to manage my current and future symptoms. Agree   Since my last session I feel my symptoms have --------. Stayed the same   Overall, since starting therapy I feel my symptoms are --------. About the same     THERAPEUTIC ACTIVITIES    Asked many questions about the nature of her symptoms, and I answered all of these thoroughly.    Exercises to promote optimal respiratory mechanics    I provided explanation of the anatomy and physiology of respiration for speech and singing; she found this  "to be helpful    she demonstrated clavicular/neck/shoulder involvement in inhalation    Demonstrated difficulty allowing abdominal relaxation for inhalation    Practiced in a seated (lying on her back is not comfortable with her scoliosis; lying on her side is ok).    With clinician support, patient was able to demonstrate improved abdominal relaxation and engagement on inhalation    Optimal exhalation using inward engagement of the abdominal wall with no corresponding collapse of the upper chest cavity was trained using the pulsed \"sh\" task    Rescue breathing strategies using oral configurations to promote improved vocal fold abduction were instructed    Patient was able to demonstrate use of these techniques in combination with low respiratory engagement     A plan for when and how to implement these strategies was developed, and the patient was encouraged to practice the techniques independent of distress two times daily to habituate their use.    acceptable improvement in airflow and respiratory mechanics    Semi-Occluded Vocal Tract (SOVT) exercises instructed to reduce laryngeal tension, promote vocal fold pliability, and coordinate respiration and phonation    Straw phonation with water resistance was found to be most facilitating; she developed a new understanding, as she has worked with this in the past.    Initially focused on improving breath flow with the use of water resistance; this was helpful and she reported improved awareness of the imbalance in her breath coordination, as well as poor ability to sustain.      Sustained phonation, and voice vs. voiceless productions used to promote easy voicing and raise awareness of laryngeal tension.     Ascending and descending glides utilized to promote vocal fold pliability    \"Messa di voce\", gradual crescendo and decrescendo to vary medial compression was also utilized to promote vocal fold pliability.    Instructed on the benefits of using these exercises " for improved coordination of breath flow with phonation and tissue mobilization.    Exercises in techniques for improved airflow during phonation    Speech material with /ju/ glides and aspirate onsets was facilitating at the word level.    Progressed to easy onset/ flow phrases    Chanting was most facilitating today; she also preferred not to vary her pitch in an exaggerated manner.    Adequate progress, as her rough voice quality was inconsistently minimized, and never fully resolved.    Counseling and Education:    Asked many questions about the nature of her symptoms, and I answered all of these thoroughly.    I provided a AVS by email and handouts of today's therapeutic activities to facilitate practice.    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 Ms. Jonas demonstrated adequate progress today.   Her rough voice quality was inconsistently minimized, and never fully resolved during most of the therapeutic activities.  We will continue to work to achieve her goals.  Laryngeal function studies were completed and also demonstrated significant limitations to her breath.    PLAN: I will see Ms. Jonas in 2-3 weeks, at which point we will continue therapy.   For practice goals see AVS.     TOTAL SERVICE TIME: 75 minutes  TREATMENT (91508)  LARYNGEAL FUNCTION STUDIES (17721)  NO CHARGE FACILITY FEE (85229)    Britney Gaming M.M. (voice), M.A., CCC/SLP  Speech-Language Pathologist  Certificate of Vocology  [facility]  [phone]  [email or web address]  Prounouns: she/her

## 2019-03-05 NOTE — PATIENT INSTRUCTIONS
"[email or web address]      Has more strength now, but is still better in the morning and then deteriorates.  Chant who phrases on spacious speech    Start with 2x/day and then maybe 3 later.    Words in the morning.  Breathe and speak marcella linda lainet    After Visit Summary    Patient: Katherin Jonas  Date of Visit: 3/4/2019    Breathing:    In the morning and evening (twice daily) for 2minutes:   o Breathe while seated - Hands on tummy and chest.  Take a breath in with rounded lips and exhale with a  sh    o Try to keep shoulders down while inhaling and neck relaxed.  o Throughout the day (2-3x/day for just a couple minutes) check breathing while keeping shoulders relaxed (ie: riding to and from work, etc.)    Voice (2-3x/day unless otherwise noted):    Semi-occluded vocal tract exercise:   o Bubbles (straw in 1 to 1.5  of water) 2x/day (work up to 3x) for 1-2 min:  o 10x: blow 10-15 seconds with no voice and keep bubbles consistent.  o 5x: blow bubbles and add a sustained  who  or an  oo  (\"humming through the straw\")        Spacious speech phrases  (2-3x per day)  o Second column - H+ vowel combinations   o First column  o Chanting  With the exercises - so, stay on one pitch    : Sharon \"Horace\" Ha [phone]/ [email or web address]    Britney Gaming M.M. (voice), M.A., CCC/SLP  Speech-Language Pathologist  Certificate of Vocology  [facility]  [phone]  [email or web address]  Prounouns: she/her    "

## 2019-03-08 ENCOUNTER — OFFICE VISIT (OUTPATIENT)
Dept: FAMILY MEDICINE | Facility: OTHER | Age: 79
End: 2019-03-08
Payer: COMMERCIAL

## 2019-03-08 VITALS
TEMPERATURE: 98.1 F | BODY MASS INDEX: 21.97 KG/M2 | SYSTOLIC BLOOD PRESSURE: 130 MMHG | RESPIRATION RATE: 14 BRPM | OXYGEN SATURATION: 97 % | WEIGHT: 102.4 LBS | HEART RATE: 50 BPM | DIASTOLIC BLOOD PRESSURE: 60 MMHG

## 2019-03-08 DIAGNOSIS — M41.9 SCOLIOSIS, UNSPECIFIED SCOLIOSIS TYPE, UNSPECIFIED SPINAL REGION: ICD-10-CM

## 2019-03-08 DIAGNOSIS — G89.29 OTHER CHRONIC PAIN: Primary | ICD-10-CM

## 2019-03-08 PROCEDURE — 99213 OFFICE O/P EST LOW 20 MIN: CPT | Performed by: FAMILY MEDICINE

## 2019-03-08 RX ORDER — OXYCODONE AND ACETAMINOPHEN 7.5; 325 MG/1; MG/1
1 TABLET ORAL 4 TIMES DAILY
Qty: 120 TABLET | Refills: 0 | Status: SHIPPED | OUTPATIENT
Start: 2019-03-08 | End: 2019-03-08

## 2019-03-08 RX ORDER — OXYCODONE AND ACETAMINOPHEN 7.5; 325 MG/1; MG/1
1 TABLET ORAL 4 TIMES DAILY
Qty: 120 TABLET | Refills: 0 | Status: SHIPPED | OUTPATIENT
Start: 2019-04-08 | End: 2019-03-08

## 2019-03-08 RX ORDER — OXYCODONE AND ACETAMINOPHEN 7.5; 325 MG/1; MG/1
1 TABLET ORAL 4 TIMES DAILY
Qty: 120 TABLET | Refills: 0 | Status: SHIPPED | OUTPATIENT
Start: 2019-05-08 | End: 2019-06-07

## 2019-03-15 DIAGNOSIS — E78.5 HYPERLIPIDEMIA, UNSPECIFIED HYPERLIPIDEMIA TYPE: ICD-10-CM

## 2019-03-15 RX ORDER — ATORVASTATIN CALCIUM 10 MG/1
TABLET, FILM COATED ORAL
Qty: 90 TABLET | Refills: 1 | Status: SHIPPED | OUTPATIENT
Start: 2019-03-15 | End: 2019-06-11 | Stop reason: SINTOL

## 2019-03-15 NOTE — TELEPHONE ENCOUNTER
Lipitor  Prescription approved per [facility] Refill Protocol.    Smiley Shannon, RN, BSN

## 2019-03-27 ENCOUNTER — TELEPHONE (OUTPATIENT)
Dept: FAMILY MEDICINE | Facility: OTHER | Age: 79
End: 2019-03-27

## 2019-03-27 NOTE — TELEPHONE ENCOUNTER
"Reason for Call:  Other call back    Detailed comments: pt would like to get rid of the oxygen \"thing\" she's been using as she is not using it anymore. She said she needs a doctor's release to return it. Wondering if TC needs to see her for this, or if she can just write a letter or something. Please advise.     Phone Number Patient can be reached at: Home number on file [phone] (home) or cell: [phone]    Best Time: any     Can we leave a detailed message on this number? YES    Call taken on 3/27/2019 at 11:28 AM by Matilda Michael      "

## 2019-03-27 NOTE — TELEPHONE ENCOUNTER
Is it an oxygen tank? If the company needs something to have it returned, I would recommend they fax us a document to sign. Thanks.    Srinivasa Flores PA-C

## 2019-03-28 ENCOUNTER — TELEPHONE (OUTPATIENT)
Dept: FAMILY MEDICINE | Facility: OTHER | Age: 79
End: 2019-03-28

## 2019-03-28 NOTE — TELEPHONE ENCOUNTER
I can't find any documentation that PCP (TC) wanted oxygen discontinued. She signed a form on 2/26/19 for patient yearly oxygen renewal.     Advise appointment or wait until TC return.     Koby Anglin PA-C  [facility] '

## 2019-03-28 NOTE — TELEPHONE ENCOUNTER
Reason for Call: Request for an order or referral:    Order or referral being requested: pt needs an order to discontinue O2    Date needed: as soon as possible    Has the patient been seen by the PCP for this problem? YES    Additional comments: please fax order to [facility] stating pt can discontinue O2 to [phone].  Call pt with any questions.  thanks    Phone number Patient can be reached at:  Home number on file [phone] (home)    Best Time:  any    Can we leave a detailed message on this number?  YES    Call taken on 3/28/2019 at 12:25 PM by Gina Lizama

## 2019-03-28 NOTE — TELEPHONE ENCOUNTER
LM for patient to return phone call to clinic about message below.  Juanita Bacon CMA ([facility])

## 2019-03-28 NOTE — LETTER
[address]  [facility] [address]  [phone]        April 1, 2019    Katherin Jonas  [address] [address] [place] [id number]          Dear Katherin,    We have been unable to reach you by phone. Dr. Matthews is out of clinic until 4/9/19 so another provider has reviewed your request to discontinue oxygen. On 2/26/19 Dr. Matthews signed an order for your yearly oxygen renewal. Also, there is no documentation to support the discontinuation of your oxygen. We would recommend either a follow up appointment to discuss this, or you could wait until Dr. Matthews returns to clinic to review. Please let us know how you would like to proceed.    Sincerely,      Your [facility] Care Team

## 2019-03-29 NOTE — TELEPHONE ENCOUNTER
AMNA Pandey:   To call back regarding her Oxygen.   Silvia AMAYA Is not back in clinic until 4/9,  And we cannot find an order from her for DC of O2

## 2019-04-03 ENCOUNTER — TELEPHONE (OUTPATIENT)
Dept: FAMILY MEDICINE | Facility: OTHER | Age: 79
End: 2019-04-03

## 2019-04-03 NOTE — TELEPHONE ENCOUNTER
"Per other encounter: \"I can't find any documentation that PCP (TC) wanted oxygen discontinued. She signed a form on 2/26/19 for patient yearly oxygen renewal.      Advise appointment or wait until TC return.      Koby Anglin PA-C  [facility] '\"    Will route to TC to review.  Sanjuanita Fiore, [facility]    "

## 2019-04-03 NOTE — TELEPHONE ENCOUNTER
Reason for Call:  Other oxygen    Detailed comments: patient states she received a letter stating she should not quit her oxygen. She hasn't been on it since august and returned her stuff yesterday.   Please call her     Phone Number Patient can be reached at: Home number on file [phone] (home)    Best Time: any    Can we leave a detailed message on this number? YES    Call taken on 4/3/2019 at 4:41 PM by Kendra Villafana

## 2019-04-09 NOTE — TELEPHONE ENCOUNTER
"Patient informed. She sent her oxygen back last Tuesday - because she \"hasn't used it in 6-7 months\"  "

## 2019-04-09 NOTE — TELEPHONE ENCOUNTER
Reviewed chart, it appears 9/6/18 Cardiology remarked that she hadn't required oxygen further.  Her oxygen sats have been good.  OK to discontinue oxygen.

## 2019-05-07 DIAGNOSIS — G47.00 INSOMNIA, UNSPECIFIED TYPE: ICD-10-CM

## 2019-05-08 NOTE — TELEPHONE ENCOUNTER
L[place]      Last Written Prescription Date:  10/12/2018  Last Fill Quantity: 90 tablets,   # refills: 1  Last Office Visit: 3/8/2019  Future Office visit:    Next 5 appointments (look out 90 days)    Jun 04, 2019 12:00 PM CDT  Office Visit with Silvia Matthews MD  [facility] [address] [address]  [phone]           Routing refill request to provider for review/approval because:  Drug not on the [facility], [facility] or  Health refill protocol or controlled substance    Jaylin Carter, RN, BSN

## 2019-05-10 RX ORDER — ESZOPICLONE 3 MG/1
TABLET, FILM COATED ORAL
Qty: 90 TABLET | Refills: 0 | Status: SHIPPED | OUTPATIENT
Start: 2019-05-10 | End: 2019-06-07

## 2019-05-24 NOTE — PROGRESS NOTES
"Subjective     Katherin Jonas is a 78 year old female who presents to clinic today for the following health issues:    HPI   Chronic/Recurring Back Pain Follow Up      Where is your back pain located? (Select all that apply) low back bilateral, middle of back bilateral, upper back bilateral and neck bilateral    How would you describe your back pain?  Sharp ache    Where does your back pain spread? the left buttock and the left  knee    Since your last clinic visit for back pain, how has your pain changed? always present, but gets better and worse    Does your back pain interfere with your job? Not applicable     Since your last visit, have you tried any new treatment? No      Amount of exercise or physical activity: None    Problems taking medications regularly: No    Medication side effects: Possibly, has been feeling \"spacey\" but is unsure if its related to her meds.  Feels lightheaded, but denies being dizzy.  States she just feels very tired all the time.  She denies falling.    Diet: regular (no restrictions)    Patient Active Problem List   Diagnosis     Esophageal reflux     Carotid atherosclerosis     CKD (chronic kidney disease) stage 3, GFR 30-59 ml/min (H)     Insomnia     Mild major depression (H)     Advanced directives, counseling/discussion     Essential hypertension     Hyperlipidemia, unspecified     Scoliosis     Osteopenia     Postherpetic neuralgia     Other chronic pain     COPD (chronic obstructive pulmonary disease) (H)     Paroxysmal atrial fibrillation (H)     Anxiety     Anticoagulant long-term use     Heart failure with preserved left ventricular function (HFpEF) (H)     Past Surgical History:   Procedure Laterality Date     C FULL ROUT OBSTE CARE, DELIV       C FULL ROUT OBSTE CARE, DELIV       C FULL ROUT OBSTE CARE, DELIV       C TOTAL KNEE ARTHROPLASTY  1997    left     C TOTAL KNEE ARTHROPLASTY  2005    right     C TREAT ECTOPIC PREG,RMV " TUBE/OVARY  1967    left     COLONOSCOPY  11    [facility]     [facility] REPAIR OF NASAL SEPTUM         Social History     Tobacco Use     Smoking status: Former Smoker     Last attempt to quit: 1979     Years since quittin.4     Smokeless tobacco: Never Used     Tobacco comment: no smokers in household   Substance Use Topics     Alcohol use: No     Comment: none since      Family History   Problem Relation Age of Onset     Substance Abuse Father      Cancer Daughter      Depression Daughter      Hypertension Mother            Reviewed and updated as needed this visit by Provider  Allergies  Meds  Problems         Review of Systems   ROS COMP: CONSTITUTIONAL: NEGATIVE for fever, chills  ENT/MOUTH: NEGATIVE for ear, mouth and throat problems  RESP: NEGATIVE for significant cough or shortness of breath  CV: NEGATIVE for chest pain, palpitations or peripheral edema  Skin: She states she has a lesion on her right cheek that dermatology tried cryotherapy but it did not completely resolve      Objective    /64 (BP Location: Left arm, Patient Position: Chair, Cuff Size: Adult Regular)   Pulse 60   Temp 98.7  F (37.1  C) (Temporal)   Resp 14   Wt 46.7 kg (103 lb)   LMP 2011   SpO2 97%   BMI 22.09 kg/m    Body mass index is 22.09 kg/m .  Physical Exam   Gen: no apparent distress  NECK: no adenopathy, no asymmetry, no masses  Chest: clear to auscultation without wheeze, rale or rhonchi  Cor: regular rate and rhythm without murmur  ABDOMEN: soft, nontender, no masses and bowel sounds normal  Ext: warm and dry without edema  Back: Kyphoscoliosis noted  Skin: Seborrheic keratosis of right cheek  Psych: Alert and oriented times 3; coherent speech, normal   rate and volume, able to articulate logical thoughts, able   to abstract reason, no tangential thoughts, no hallucinations   or delusions  Her affect is neutral          Assessment & Plan     1. Other chronic pain  Patient  tells me she could tell when she stopped taking her morphine as she feels that her pain is worse just being on the Percocet.  However she would like to stay just on the Percocet.  We discussed the spacey feeling and that opioids, gabapentin and her Lunesta can all contribute.  She was hoping to decrease her gabapentin rather than decrease her opioids.  At this time we discussed decreasing her Lunesta instead.  We will continue her on the same pain regimen and follow-up in 3 months.    - Drug  Screen Comprehensive , Urine with Reported Meds ([facility]) (Pain Care Package)  - oxyCODONE-acetaminophen (PERCOCET) 7.5-325 MG per tablet; Take 1 tablet by mouth 4 times daily  Dispense: 120 tablet; Refill: 0    2. CKD (chronic kidney disease) stage 3, GFR 30-59 ml/min (H)  She is due for labs and urine testing.    - Comprehensive metabolic panel  - CBC with platelets  - Albumin Random Urine Quantitative with Creat Ratio  - *UA reflex to Microscopic and Culture ([facility] (except [place] and [place])  - Urine Microscopic    3. Heart failure with preserved left ventricular function (HFpEF) (H)  Appears euvolemic.    - Comprehensive metabolic panel    4. Essential hypertension  Well-controlled.    - Comprehensive metabolic panel    5. Paroxysmal atrial fibrillation (H)  She follows with cardiology.  She remains on Xarelto for anticoagulation.  She is on amiodarone.      6. Insomnia, unspecified type  We will decrease her Lunesta from 3 mg to 2 mg.  Will follow-up in 3 months.  May consider decreasing this further.    - eszopiclone (LUNESTA) 2 MG tablet; Take 1 tablet (2 mg) by mouth At Bedtime  Dispense: 90 tablet; Refill: 1    7. Seborrheic keratosis  She would like to try cryotherapy again.  Liquid nitrogen was applied for 10-12 seconds to the lesion(s) and the expected blistering or scabbing reaction explained.  Return if lesion(s) fail to fully resolve.    - DESTRUCT BENIGN LESION, UP TO 14    8.  Osteopenia, unspecified location  She is due for bone density.  We will also obtain vitamin D    - DX Hip/Pelvis/Spine; Future  - Vitamin D Deficiency    9. Estrogen deficiency  She is due for bone density.    - DX Hip/Pelvis/Spine; Future    Return in about 3 months (around 9/7/2019) for pain follow up.    Silvia Matthews MD  [facility]

## 2019-05-30 DIAGNOSIS — F32.0 MILD MAJOR DEPRESSION (H): ICD-10-CM

## 2019-05-30 NOTE — TELEPHONE ENCOUNTER
Hello,  Pt would like a new rx sent here.  We have not filled this for pt before    Thank You,  Sania Nathan  Pharmacy Technician  [facility]  [phone]

## 2019-05-31 RX ORDER — FLUOXETINE 40 MG/1
40 CAPSULE ORAL DAILY
Qty: 90 CAPSULE | Refills: 3 | Status: SHIPPED | OUTPATIENT
Start: 2019-05-31 | End: 2019-07-19

## 2019-06-07 ENCOUNTER — OFFICE VISIT (OUTPATIENT)
Dept: FAMILY MEDICINE | Facility: OTHER | Age: 79
End: 2019-06-07
Payer: COMMERCIAL

## 2019-06-07 VITALS
RESPIRATION RATE: 14 BRPM | OXYGEN SATURATION: 97 % | TEMPERATURE: 98.7 F | HEART RATE: 60 BPM | SYSTOLIC BLOOD PRESSURE: 138 MMHG | DIASTOLIC BLOOD PRESSURE: 64 MMHG | BODY MASS INDEX: 22.09 KG/M2 | WEIGHT: 103 LBS

## 2019-06-07 DIAGNOSIS — N18.30 CKD (CHRONIC KIDNEY DISEASE) STAGE 3, GFR 30-59 ML/MIN (H): ICD-10-CM

## 2019-06-07 DIAGNOSIS — G89.29 OTHER CHRONIC PAIN: Primary | ICD-10-CM

## 2019-06-07 DIAGNOSIS — M85.80 OSTEOPENIA, UNSPECIFIED LOCATION: ICD-10-CM

## 2019-06-07 DIAGNOSIS — E28.39 ESTROGEN DEFICIENCY: ICD-10-CM

## 2019-06-07 DIAGNOSIS — G47.00 INSOMNIA, UNSPECIFIED TYPE: ICD-10-CM

## 2019-06-07 DIAGNOSIS — I10 ESSENTIAL HYPERTENSION: ICD-10-CM

## 2019-06-07 DIAGNOSIS — I48.0 PAROXYSMAL ATRIAL FIBRILLATION (H): ICD-10-CM

## 2019-06-07 DIAGNOSIS — I50.30 HEART FAILURE WITH PRESERVED LEFT VENTRICULAR FUNCTION (HFPEF) (H): ICD-10-CM

## 2019-06-07 DIAGNOSIS — R79.89 ABNORMAL LFTS: ICD-10-CM

## 2019-06-07 DIAGNOSIS — L82.1 SEBORRHEIC KERATOSIS: ICD-10-CM

## 2019-06-07 LAB
ALBUMIN SERPL-MCNC: 3.6 G/DL (ref 3.4–5)
ALBUMIN UR-MCNC: NEGATIVE MG/DL
ALP SERPL-CCNC: 63 U/L (ref 40–150)
ALT SERPL W P-5'-P-CCNC: 136 U/L (ref 0–50)
ANION GAP SERPL CALCULATED.3IONS-SCNC: 6 MMOL/L (ref 3–14)
APPEARANCE UR: CLEAR
AST SERPL W P-5'-P-CCNC: 78 U/L (ref 0–45)
BACTERIA #/AREA URNS HPF: ABNORMAL /HPF
BILIRUB SERPL-MCNC: 0.5 MG/DL (ref 0.2–1.3)
BILIRUB UR QL STRIP: NEGATIVE
BUN SERPL-MCNC: 21 MG/DL (ref 7–30)
CALCIUM SERPL-MCNC: 8.2 MG/DL (ref 8.5–10.1)
CHLORIDE SERPL-SCNC: 101 MMOL/L (ref 94–109)
CO2 SERPL-SCNC: 28 MMOL/L (ref 20–32)
COLOR UR AUTO: YELLOW
CREAT SERPL-MCNC: 1 MG/DL (ref 0.52–1.04)
CREAT UR-MCNC: 20 MG/DL
ERYTHROCYTE [DISTWIDTH] IN BLOOD BY AUTOMATED COUNT: 14.2 % (ref 10–15)
GFR SERPL CREATININE-BSD FRML MDRD: 54 ML/MIN/{1.73_M2}
GLUCOSE SERPL-MCNC: 89 MG/DL (ref 70–99)
GLUCOSE UR STRIP-MCNC: NEGATIVE MG/DL
HCT VFR BLD AUTO: 40.6 % (ref 35–47)
HGB BLD-MCNC: 13.3 G/DL (ref 11.7–15.7)
HGB UR QL STRIP: ABNORMAL
KETONES UR STRIP-MCNC: NEGATIVE MG/DL
LEUKOCYTE ESTERASE UR QL STRIP: ABNORMAL
MCH RBC QN AUTO: 32 PG (ref 26.5–33)
MCHC RBC AUTO-ENTMCNC: 32.8 G/DL (ref 31.5–36.5)
MCV RBC AUTO: 98 FL (ref 78–100)
MICROALBUMIN UR-MCNC: 13 MG/L
MICROALBUMIN/CREAT UR: 64.47 MG/G CR (ref 0–25)
NITRATE UR QL: NEGATIVE
NON-SQ EPI CELLS #/AREA URNS LPF: ABNORMAL /LPF
PH UR STRIP: 7 PH (ref 5–7)
PLATELET # BLD AUTO: 170 10E9/L (ref 150–450)
POTASSIUM SERPL-SCNC: 4.2 MMOL/L (ref 3.4–5.3)
PROT SERPL-MCNC: 8.2 G/DL (ref 6.8–8.8)
RBC # BLD AUTO: 4.16 10E12/L (ref 3.8–5.2)
RBC #/AREA URNS AUTO: ABNORMAL /HPF
SODIUM SERPL-SCNC: 135 MMOL/L (ref 133–144)
SOURCE: ABNORMAL
SP GR UR STRIP: 1.01 (ref 1–1.03)
UROBILINOGEN UR STRIP-ACNC: 0.2 EU/DL (ref 0.2–1)
WBC # BLD AUTO: 4.2 10E9/L (ref 4–11)
WBC #/AREA URNS AUTO: ABNORMAL /HPF

## 2019-06-07 PROCEDURE — 81001 URINALYSIS AUTO W/SCOPE: CPT | Mod: 59 | Performed by: FAMILY MEDICINE

## 2019-06-07 PROCEDURE — 82306 VITAMIN D 25 HYDROXY: CPT | Performed by: FAMILY MEDICINE

## 2019-06-07 PROCEDURE — 85027 COMPLETE CBC AUTOMATED: CPT | Performed by: FAMILY MEDICINE

## 2019-06-07 PROCEDURE — 17110 DESTRUCTION B9 LES UP TO 14: CPT | Performed by: FAMILY MEDICINE

## 2019-06-07 PROCEDURE — 99000 SPECIMEN HANDLING OFFICE-LAB: CPT | Performed by: FAMILY MEDICINE

## 2019-06-07 PROCEDURE — 80053 COMPREHEN METABOLIC PANEL: CPT | Performed by: FAMILY MEDICINE

## 2019-06-07 PROCEDURE — 82043 UR ALBUMIN QUANTITATIVE: CPT | Performed by: FAMILY MEDICINE

## 2019-06-07 PROCEDURE — 36415 COLL VENOUS BLD VENIPUNCTURE: CPT | Performed by: FAMILY MEDICINE

## 2019-06-07 PROCEDURE — 99214 OFFICE O/P EST MOD 30 MIN: CPT | Mod: 25 | Performed by: FAMILY MEDICINE

## 2019-06-07 PROCEDURE — 80307 DRUG TEST PRSMV CHEM ANLYZR: CPT | Mod: 90 | Performed by: FAMILY MEDICINE

## 2019-06-07 RX ORDER — OXYCODONE AND ACETAMINOPHEN 7.5; 325 MG/1; MG/1
1 TABLET ORAL 4 TIMES DAILY
Qty: 120 TABLET | Refills: 0 | Status: SHIPPED | OUTPATIENT
Start: 2019-08-07 | End: 2019-07-19 | Stop reason: ALTCHOICE

## 2019-06-07 RX ORDER — OXYCODONE AND ACETAMINOPHEN 7.5; 325 MG/1; MG/1
1 TABLET ORAL 4 TIMES DAILY
Qty: 120 TABLET | Refills: 0 | Status: SHIPPED | OUTPATIENT
Start: 2019-06-07 | End: 2019-06-07

## 2019-06-07 RX ORDER — OXYCODONE AND ACETAMINOPHEN 7.5; 325 MG/1; MG/1
1 TABLET ORAL 4 TIMES DAILY
Qty: 120 TABLET | Refills: 0 | Status: SHIPPED | OUTPATIENT
Start: 2019-07-07 | End: 2019-06-07

## 2019-06-07 RX ORDER — ESZOPICLONE 2 MG/1
2 TABLET, FILM COATED ORAL AT BEDTIME
Qty: 90 TABLET | Refills: 1 | Status: SHIPPED | OUTPATIENT
Start: 2019-06-07 | End: 2019-11-22

## 2019-06-10 ENCOUNTER — TELEPHONE (OUTPATIENT)
Dept: FAMILY MEDICINE | Facility: OTHER | Age: 79
End: 2019-06-10

## 2019-06-10 LAB — DEPRECATED CALCIDIOL+CALCIFEROL SERPL-MC: 46 UG/L (ref 20–75)

## 2019-06-10 NOTE — TELEPHONE ENCOUNTER
Spoke to  as patient was sleeping, scheduled for labs and then ultrasound for tomorrow 06/11  Rafia Joe MA

## 2019-06-10 NOTE — TELEPHONE ENCOUNTER
AMNA for Katherin to return call:              Liver tests are elevated, which are unusual for her.  I would like her to have a liver ultrasound and repeat her labs.      The order for an abdominal US has been placed by TC

## 2019-06-11 ENCOUNTER — ANCILLARY PROCEDURE (OUTPATIENT)
Dept: ULTRASOUND IMAGING | Facility: OTHER | Age: 79
End: 2019-06-11
Attending: FAMILY MEDICINE
Payer: COMMERCIAL

## 2019-06-11 DIAGNOSIS — R79.89 ABNORMAL LFTS: ICD-10-CM

## 2019-06-11 LAB
ALBUMIN SERPL-MCNC: 3.4 G/DL (ref 3.4–5)
ALP SERPL-CCNC: 59 U/L (ref 40–150)
ALT SERPL W P-5'-P-CCNC: 138 U/L (ref 0–50)
AST SERPL W P-5'-P-CCNC: 85 U/L (ref 0–45)
BILIRUB DIRECT SERPL-MCNC: 0.2 MG/DL (ref 0–0.2)
BILIRUB SERPL-MCNC: 0.6 MG/DL (ref 0.2–1.3)
HAV IGG SER QL IA: NONREACTIVE
HCV AB SERPL QL IA: NONREACTIVE
PAIN DRUG SCR UR W RPTD MEDS: NORMAL
PROT SERPL-MCNC: 7.5 G/DL (ref 6.8–8.8)

## 2019-06-11 PROCEDURE — 36415 COLL VENOUS BLD VENIPUNCTURE: CPT | Performed by: FAMILY MEDICINE

## 2019-06-11 PROCEDURE — 76705 ECHO EXAM OF ABDOMEN: CPT

## 2019-06-11 PROCEDURE — 86803 HEPATITIS C AB TEST: CPT | Performed by: FAMILY MEDICINE

## 2019-06-11 PROCEDURE — 80076 HEPATIC FUNCTION PANEL: CPT | Performed by: FAMILY MEDICINE

## 2019-06-11 PROCEDURE — 86708 HEPATITIS A ANTIBODY: CPT | Performed by: FAMILY MEDICINE

## 2019-06-12 ENCOUNTER — TELEPHONE (OUTPATIENT)
Dept: FAMILY MEDICINE | Facility: OTHER | Age: 79
End: 2019-06-12

## 2019-06-12 NOTE — TELEPHONE ENCOUNTER
----- Message from Silvia Matthews MD sent at 6/12/2019  9:04 AM CDT -----  Hepatitis labs negative.  Electronically signed by Silvia Matthews MD on 6/12/2019

## 2019-06-12 NOTE — TELEPHONE ENCOUNTER
I gave patient both of the messages from below. She will stop the medications as directed and follow up in one month.  Thank you

## 2019-06-12 NOTE — TELEPHONE ENCOUNTER
----- Message from Silvia Matthews MD sent at 6/11/2019  5:45 PM CDT -----  Liver tests still elevated, US was normal.  Still awaiting hepatitis labs.  She is on multiple medications that can affect her liver.  I would like her to stop atorvastatin, tizanidine and spironolactone.  Then follow up with me in a month for follow up and repeat labs.    Electronically signed by Silvia Matthews MD on 6/11/2019

## 2019-06-21 ENCOUNTER — TELEPHONE (OUTPATIENT)
Dept: FAMILY MEDICINE | Facility: OTHER | Age: 79
End: 2019-06-21

## 2019-06-21 ENCOUNTER — ANCILLARY PROCEDURE (OUTPATIENT)
Dept: BONE DENSITY | Facility: CLINIC | Age: 79
End: 2019-06-21
Attending: FAMILY MEDICINE
Payer: COMMERCIAL

## 2019-06-21 DIAGNOSIS — E28.39 ESTROGEN DEFICIENCY: ICD-10-CM

## 2019-06-21 DIAGNOSIS — M81.8 OTHER OSTEOPOROSIS WITHOUT CURRENT PATHOLOGICAL FRACTURE: ICD-10-CM

## 2019-06-21 DIAGNOSIS — M85.80 OSTEOPENIA, UNSPECIFIED LOCATION: ICD-10-CM

## 2019-06-21 PROCEDURE — 77081 DXA BONE DENSITY APPENDICULR: CPT | Performed by: RADIOLOGY

## 2019-06-21 PROCEDURE — 77080 DXA BONE DENSITY AXIAL: CPT | Mod: 59 | Performed by: RADIOLOGY

## 2019-06-21 NOTE — TELEPHONE ENCOUNTER
----- Message from Silvia Matthews MD sent at 6/21/2019  4:20 PM CDT -----  Bone density is worsening, I would recommend restarting her Fosamax weekly.  Electronically signed by Silvia Matthews MD on 6/21/2019

## 2019-06-21 NOTE — LETTER
[address]   [facility] [address]  Phone: [phone]    06/24/19    Katherin Jonas  [address] [address] [place] [id number]        Katherin,    We have been trying to reach you via phone and have been unsuccessful.     Your bone density is worsening, Dr. Matthews recommends restarting your Fosamax weekly.     Please let us know if you have any questions or concerns at [phone].        Sincerely,  Your [facility] Care Team

## 2019-06-24 NOTE — TELEPHONE ENCOUNTER
Attempted to contact patient - phone is not accepting calls at this time. Will try again later.  Sanjuanita Fiore, CMA

## 2019-06-24 NOTE — TELEPHONE ENCOUNTER
"Attempted to reach patient on cell phone listed, not accepting calls. Attempted home number listed, sounded like someone answered the phone, asked \"hello\" twice and then hung up. Will send letter.  Rafia Joe MA    "

## 2019-06-26 NOTE — PROGRESS NOTES
"Subjective     Katherin Jonas is a 78 year old female who presents to clinic today for the following health issues:    HPI     Patient following up on \"liver testing\" last month she had a liver test done which were much more elevated compared to a year ago.  She denied having nausea, abdominal pain, diarrhea or constipation.  She had negative hepatitis testing.  She had a liver ultrasound which is negative.    Hyperlipidemia Follow-Up      Are you having any of the following symptoms? (Select all that apply)  No complaints of shortness of breath, chest pain or pressure.  No increased sweating or nausea with activity.  No left-sided neck or arm pain.  No complaints of pain in calves when walking 1-2 blocks.    Are you regularly taking any medication or supplement to lower your cholesterol?   No    Are you having muscle aches or other side effects that you think could be caused by your cholesterol lowering medication?  No      Hypertension Follow-up      Do you check your blood pressure regularly outside of the clinic? Yes     Are you following a low salt diet? Yes    Are your blood pressures ever more than 140 on the top number (systolic) OR more   than 90 on the bottom number (diastolic), for example 140/90? No     Heart Failure Follow-up  Are you experiencing any shortness of breath? Yes, with activity only   How would you describe your shortness of breath?  Same as usual    Are you experiencing any swelling in your legs or feet?  No    Are you using more pillows than usual? No    Do you cough at night?  No    Do you check your weight daily?  Yes    Have you had a weight change recently?  Weight increase \"I think iv'e gone up a couple pounds.\"    Are you having any of the following side effects from your medications? (Select all that apply)  Fatigue and Other:  Lightheaded sometimes    Since your last visit, how many times have you gone to the cardiologist, urgent care, emergency room, or hospital because of your " "heart failure?   None    Annual Wellness Visit    Are you in the first 12 months of your Medicare Part B coverage?  No    Physical Health:    In general, how would you rate your overall physical health? good    If you drink alcohol do you typically have >3 drinks per day or >7 drinks per week? No    Do you usually eat at least 4 servings of fruit and vegetables a day, include whole grains & fiber and avoid regularly eating high fat or \"junk\" foods? NO    Needs assistance for the following daily activities: no assistance needed    Which of the following safety concerns are present in your home?  none identified     Hearing impairment: No - does wear hearing aides and \"I need new ones, one just quit\"    In the past 6 months, have you been bothered by leaking of urine? no    Mental Health:    In general, how would you rate your overall mental or emotional health? fair    PHQ-2 Score:  1    Do you feel safe in your environment? Yes    Do you have a Health Care Directive? Yes: Patient states has Advance Directive and will bring in a copy to clinic.    Fall risk:  Fallen 2 or more times in the past year?: No  Any fall with injury in the past year?: No    Cognitive Screenin) Repeat 3 items (Leader, Season, Table)    2) Clock draw: NORMAL  3) 3 item recall: Recalls 2 objects   Results: NORMAL clock, 1-2 items recalled: COGNITIVE IMPAIRMENT LESS LIKELY    Mini-CogTM Copyright S Qi. Licensed by the author for use in [facility]; reprinted with permission (tawanna@[facility]). All rights reserved.      Current providers sharing in care for this patient include:   Patient Care Team:  Silvia Matthews MD as PCP - General (Family Practice)  Silvia Matthews MD as Assigned PCP        Do you have sleep apnea, excessive snoring or daytime drowsiness?: no      Patient Active Problem List   Diagnosis     Esophageal reflux     Carotid atherosclerosis     CKD (chronic kidney disease) stage 3, GFR 30-59 ml/min " (H)     Insomnia     Mild major depression (H)     Advanced directives, counseling/discussion     Essential hypertension     Hyperlipidemia, unspecified     Scoliosis     Age-related osteoporosis without current pathological fracture     Postherpetic neuralgia     Other chronic pain     COPD (chronic obstructive pulmonary disease) (H)     Paroxysmal atrial fibrillation (H)     Anxiety     Anticoagulant long-term use     Heart failure with preserved left ventricular function (HFpEF) (H)     Past Surgical History:   Procedure Laterality Date     C FULL ROUT OBSTE CARE, DELIV  1964     C FULL ROUT OBSTE CARE, DELIV  1969     C FULL ROUT OBSTE CARE, DELIV  1970     C TOTAL KNEE ARTHROPLASTY  1997    left     C TOTAL KNEE ARTHROPLASTY  2005    right     C TREAT ECTOPIC PREG,RMV TUBE/OVARY  1967    left     COLONOSCOPY  11    [facility] REPAIR OF NASAL SEPTUM         Social History     Tobacco Use     Smoking status: Former Smoker     Last attempt to quit: 1979     Years since quittin.5     Smokeless tobacco: Never Used     Tobacco comment: no smokers in household   Substance Use Topics     Alcohol use: No     Comment: none since      Family History   Problem Relation Age of Onset     Substance Abuse Father      Cancer Daughter      Depression Daughter      Hypertension Mother            Reviewed and updated as needed this visit by Provider  Allergies  Meds  Problems         Review of Systems   Constitutional: Negative for appetite change and fever.   HENT: Negative for congestion, ear pain and sore throat.    Eyes: Negative for pain.   Respiratory: Negative for cough and shortness of breath.    Cardiovascular: Negative for chest pain and peripheral edema.   Gastrointestinal: Negative for abdominal pain, constipation, heartburn and nausea.   Genitourinary: Negative for frequency.   Musculoskeletal: Positive for back pain (Chronic).  "  Neurological: Negative for dizziness and headaches.   Psychiatric/Behavioral: Positive for dysphoric mood.        Feels her insomnia has worsened with a decrease in Lunesta.          Objective    /68   Pulse 59   Temp 98  F (36.7  C) (Temporal)   Resp 14   Ht 1.454 m (4' 9.25\")   Wt 46.7 kg (103 lb)   LMP 02/01/2011   SpO2 96%   BMI 22.09 kg/m    Body mass index is 22.09 kg/m .  Physical Exam   Constitutional: She is oriented to person, place, and time. She appears well-developed and well-nourished. No distress.   HENT:   Right Ear: Tympanic membrane and external ear normal.   Left Ear: Tympanic membrane and external ear normal.   Nose: Nose normal.   Mouth/Throat: Oropharynx is clear and moist. No oropharyngeal exudate.   Eyes: Pupils are equal, round, and reactive to light. Conjunctivae are normal. Right eye exhibits no discharge. Left eye exhibits no discharge.   Neck: Neck supple. No tracheal deviation present. No thyromegaly present.   Cardiovascular: Normal rate, regular rhythm, S1 normal, S2 normal, normal heart sounds and normal pulses. Exam reveals no S3, no S4 and no friction rub.   No murmur heard.  Pulmonary/Chest: Effort normal and breath sounds normal. No respiratory distress. She has no wheezes. She has no rales.   Abdominal: Soft. Bowel sounds are normal. She exhibits no mass. There is no hepatosplenomegaly. There is no tenderness.   Musculoskeletal: She exhibits no edema.   Kyphoscoliosis   Lymphadenopathy:     She has no cervical adenopathy.   Neurological: She is alert and oriented to person, place, and time. She has normal strength and normal reflexes. She exhibits normal muscle tone.   Skin: Skin is warm and dry. No rash noted.   Psychiatric: She has a normal mood and affect. Judgment and thought content normal. Cognition and memory are normal.            Assessment & Plan     1. Abnormal LFTs  Unclear etiology.  Her statin has been discontinued.  She is a normal liver ultrasound " and negative hepatitis studies.  Discussed that she is on multiple medications which could affect the liver including Percocet, amiodarone, Lunesta, fluoxetine and Xarelto.  Will change Percocet to oxycodone.  Will decrease her fluoxetine.  We will recheck her liver test today.  If things are not improving with these changes would then consider GI consult.    - Hepatic panel    2. Hyperlipidemia, unspecified hyperlipidemia type  She is concerned about her cholesterol now that she is off of her statin.  Will check fasting lipids today.    - Hepatic panel  - Lipid panel reflex to direct LDL Fasting    3. Other chronic pain  Will change her from Percocet to oxycodone.  She was taking a total of 30 mg of oxycodone and her Percocet, patient does not want to cut tabs in half and therefore will have her take oxycodone 6 times a day to equal 30 mg in 24 hours.  Discussed that we can no longer postdate prescriptions.  She will need to request a refill a few days before her next due date.  As I will be seeing her in 1 month will refill at that time.    - oxyCODONE (ROXICODONE) 5 MG tablet; Take 1 tablet (5 mg) by mouth every 4 hours as needed for pain  Dispense: 180 tablet; Refill: 0    4. Mild major depression (H)  She is feeling blah.  She is unsure if the fluoxetine is helpful.  Previously she has been on Celexa and possibly Zoloft.  Discussed will not increase her Lunesta as it does have some hepatic concerns.  We will decrease her fluoxetine from 40 mg to 20 mg and will start mirtazapine at 7.5 mg at bedtime to see if this helps with her mood as well as her sleep.  We will have her follow-up in 1 month.    - FLUoxetine (PROZAC) 20 MG capsule; Take 1 capsule (20 mg) by mouth daily  Dispense: 30 capsule; Refill: 1  - mirtazapine (REMERON) 7.5 MG tablet; Take 1 tablet (7.5 mg) by mouth At Bedtime  Dispense: 30 tablet; Refill: 1    5. Age-related osteoporosis without current pathological fracture  She had a recent bone  density which revealed osteoporosis which is worsened from her previous.  Her kidney function did not support use of Fosamax.  Will refer to endocrinology to discuss her other options.    - ENDOCRINOLOGY ADULT REFERRAL     Return in about 4 weeks (around 8/16/2019) for depression follow up.    Silvia Matthews MD  [facility]

## 2019-07-02 ENCOUNTER — TELEPHONE (OUTPATIENT)
Dept: FAMILY MEDICINE | Facility: OTHER | Age: 79
End: 2019-07-02

## 2019-07-02 DIAGNOSIS — M85.80 OSTEOPENIA: ICD-10-CM

## 2019-07-02 RX ORDER — ALENDRONATE SODIUM 70 MG/1
TABLET ORAL
Qty: 12 TABLET | Refills: 3 | Status: CANCELLED | OUTPATIENT
Start: 2019-07-02

## 2019-07-02 NOTE — TELEPHONE ENCOUNTER
Reason for Call:  Medication or medication refill:    Do you use a [facility]?  Name of the pharmacy and phone number for the current request:  [facility]    Name of the medication requested: Fosamax    Other request: patient states she received a letter from Dr Matthews that she would like the patient to restart this medication. Patient would like only a 30 day supply because she has an appointment in a couple of weeks and would like to talk to her about it at the appointment.    Can we leave a detailed message on this number? YES    Phone number patient can be reached at: Cell number on file:    Telephone Information:   Mobile [phone]       Best Time: anytime    Call taken on 7/2/2019 at 9:15 AM by Yoselin Whipple

## 2019-07-02 NOTE — TELEPHONE ENCOUNTER
Pended medication and sent to RN refill pool  Juanita Bacon CMA ([facility])

## 2019-07-03 ENCOUNTER — TRANSFERRED RECORDS (OUTPATIENT)
Dept: HEALTH INFORMATION MANAGEMENT | Facility: CLINIC | Age: 79
End: 2019-07-03

## 2019-07-19 ENCOUNTER — OFFICE VISIT (OUTPATIENT)
Dept: FAMILY MEDICINE | Facility: OTHER | Age: 79
End: 2019-07-19
Payer: COMMERCIAL

## 2019-07-19 VITALS
TEMPERATURE: 98 F | SYSTOLIC BLOOD PRESSURE: 134 MMHG | DIASTOLIC BLOOD PRESSURE: 68 MMHG | RESPIRATION RATE: 14 BRPM | HEIGHT: 57 IN | BODY MASS INDEX: 22.22 KG/M2 | OXYGEN SATURATION: 96 % | HEART RATE: 59 BPM | WEIGHT: 103 LBS

## 2019-07-19 DIAGNOSIS — E78.5 HYPERLIPIDEMIA, UNSPECIFIED HYPERLIPIDEMIA TYPE: ICD-10-CM

## 2019-07-19 DIAGNOSIS — F32.0 MILD MAJOR DEPRESSION (H): ICD-10-CM

## 2019-07-19 DIAGNOSIS — R79.89 ABNORMAL LFTS: Primary | ICD-10-CM

## 2019-07-19 DIAGNOSIS — G89.29 OTHER CHRONIC PAIN: ICD-10-CM

## 2019-07-19 DIAGNOSIS — M81.0 AGE-RELATED OSTEOPOROSIS WITHOUT CURRENT PATHOLOGICAL FRACTURE: ICD-10-CM

## 2019-07-19 LAB
ALBUMIN SERPL-MCNC: 3.4 G/DL (ref 3.4–5)
ALP SERPL-CCNC: 64 U/L (ref 40–150)
ALT SERPL W P-5'-P-CCNC: 68 U/L (ref 0–50)
AST SERPL W P-5'-P-CCNC: 57 U/L (ref 0–45)
BILIRUB DIRECT SERPL-MCNC: 0.2 MG/DL (ref 0–0.2)
BILIRUB SERPL-MCNC: 0.6 MG/DL (ref 0.2–1.3)
CHOLEST SERPL-MCNC: 177 MG/DL
HDLC SERPL-MCNC: 63 MG/DL
LDLC SERPL CALC-MCNC: 99 MG/DL
NONHDLC SERPL-MCNC: 114 MG/DL
PROT SERPL-MCNC: 7.8 G/DL (ref 6.8–8.8)
TRIGL SERPL-MCNC: 75 MG/DL

## 2019-07-19 PROCEDURE — G0439 PPPS, SUBSEQ VISIT: HCPCS | Performed by: FAMILY MEDICINE

## 2019-07-19 PROCEDURE — 80076 HEPATIC FUNCTION PANEL: CPT | Performed by: FAMILY MEDICINE

## 2019-07-19 PROCEDURE — 36415 COLL VENOUS BLD VENIPUNCTURE: CPT | Performed by: FAMILY MEDICINE

## 2019-07-19 PROCEDURE — 99213 OFFICE O/P EST LOW 20 MIN: CPT | Mod: 25 | Performed by: FAMILY MEDICINE

## 2019-07-19 PROCEDURE — 80061 LIPID PANEL: CPT | Performed by: FAMILY MEDICINE

## 2019-07-19 RX ORDER — MIRTAZAPINE 7.5 MG/1
7.5 TABLET, FILM COATED ORAL AT BEDTIME
Qty: 30 TABLET | Refills: 1 | Status: SHIPPED | OUTPATIENT
Start: 2019-07-19 | End: 2019-08-20 | Stop reason: SINTOL

## 2019-07-19 RX ORDER — OXYCODONE HYDROCHLORIDE 5 MG/1
5 TABLET ORAL EVERY 4 HOURS PRN
Qty: 180 TABLET | Refills: 0 | Status: SHIPPED | OUTPATIENT
Start: 2019-07-19 | End: 2019-08-20

## 2019-07-19 ASSESSMENT — ENCOUNTER SYMPTOMS
COUGH: 0
FEVER: 0
NAUSEA: 0
APPETITE CHANGE: 0
SORE THROAT: 0
HEADACHES: 0
ABDOMINAL PAIN: 0
BACK PAIN: 1
DIZZINESS: 0
EYE PAIN: 0
DYSPHORIC MOOD: 1
CONSTIPATION: 0
FREQUENCY: 0
SHORTNESS OF BREATH: 0
HEARTBURN: 0

## 2019-07-19 ASSESSMENT — MIFFLIN-ST. JEOR: SCORE: 825.04

## 2019-07-19 ASSESSMENT — PATIENT HEALTH QUESTIONNAIRE - PHQ9: SUM OF ALL RESPONSES TO PHQ QUESTIONS 1-9: 10

## 2019-07-19 NOTE — PATIENT INSTRUCTIONS

## 2019-07-19 NOTE — TELEPHONE ENCOUNTER
Patient returned call to clinic and informed of TCs note below. She will speak to her Cardiologist.     Charleen Bonner MA     Street

## 2019-07-23 ENCOUNTER — TELEPHONE (OUTPATIENT)
Dept: FAMILY MEDICINE | Facility: OTHER | Age: 79
End: 2019-07-23

## 2019-07-23 NOTE — TELEPHONE ENCOUNTER
Liver tests are coming down and almost back to normal.  Let's have her follow up in 1 month and can recheck them again.

## 2019-07-23 NOTE — TELEPHONE ENCOUNTER
Reason for Call:  Other results     Detailed comments: pt calling, looking for lab results from 7/19. Please advise.     Phone Number Patient can be reached at: Home number on file [phone] (home)    Best Time: any     Can we leave a detailed message on this number? YES    Call taken on 7/23/2019 at 2:31 PM by Matilda Michael

## 2019-08-01 ENCOUNTER — TRANSFERRED RECORDS (OUTPATIENT)
Dept: HEALTH INFORMATION MANAGEMENT | Facility: CLINIC | Age: 79
End: 2019-08-01

## 2019-08-05 NOTE — PROGRESS NOTES
Subjective     Katherin Jonas is a 78 year old female who presents to clinic today for the following health issues:    History of Present Illness        Mental Health Follow-up:  Patient presents to follow-up on Depression & Anxiety.Patient's depression since last visit has been:  Worse  The patient is not having other symptoms associated with depression.  Patient's anxiety since last visit has been:  Worse  The patient is not having other symptoms associated with anxiety.  Any significant life events: No  Patient is not feeling anxious or having panic attacks.  Patient has no concerns about alcohol or drug use.     Social History  Tobacco Use    Smoking status: Former Smoker      Quit date: 1979      Years since quittin.6    Smokeless tobacco: Never Used    Tobacco comment: no smokers in household  Alcohol use: No    Comment: none since   Drug use: No      Today's PHQ-9         PHQ-9 Total Score:         PHQ-9 Q9 Thoughts of better off dead/self-harm past 2 weeks :       Thoughts of suicide or self harm:      Self-harm Plan:        Self-harm Action:          Safety concerns for self or others:           Patient reports worsening sleep after decreasing her Lunesta.  She did start the Remeron but noticed she gained weight in her stomach and stopped it after 6 days.  When she was on the Remeron she did find that it helped with her sleep.  Since her fluoxetine was decreased she feels her mood has worsened and she requests to increase this.    She states the change from Norco to oxycodone was relatively smooth.  She feels better when she was on the Norco.  Sometimes she is taking 2 of her oxycodone and feels this works better than just taking one.  She does not always take all 6 tablets in a day.  She went to urgent care for some arm pain has been going on for many months.  Discussed that this most likely was not related to her statin as she has been off of this and the pain continues.  They prescribe  Tylenol but she has not started this yet.    She reports a rash under both breast and umbilicus.  She used a steroid cream with some improvement but it did not completely resolve this.    She saw cardiology who wants her back on her cholesterol medication.  They did do labs at that visit and she continued to have mild liver tests however they were not as bad as they were couple months ago.    Patient Active Problem List   Diagnosis     Esophageal reflux     Carotid atherosclerosis     CKD (chronic kidney disease) stage 3, GFR 30-59 ml/min (H)     Insomnia     Mild major depression (H)     Advanced directives, counseling/discussion     Essential hypertension     Hyperlipidemia, unspecified     Scoliosis     Age-related osteoporosis without current pathological fracture     Postherpetic neuralgia     Other chronic pain     COPD (chronic obstructive pulmonary disease) (H)     Paroxysmal atrial fibrillation (H)     Anxiety     Anticoagulant long-term use     Heart failure with preserved left ventricular function (HFpEF) (H)     Past Surgical History:   Procedure Laterality Date     C FULL ROUT OBSTE CARE, DELIV  1964     C FULL ROUT OBSTE CARE, DELIV       C FULL ROUT OBSTE CARE, DELIV       C TOTAL KNEE ARTHROPLASTY  1997    left     C TOTAL KNEE ARTHROPLASTY  2005    right     C TREAT ECTOPIC PREG,RMV TUBE/OVARY  1967    left     COLONOSCOPY  11    [facility]     HC REPAIR OF NASAL SEPTUM         Social History     Tobacco Use     Smoking status: Former Smoker     Last attempt to quit: 1979     Years since quittin.6     Smokeless tobacco: Never Used     Tobacco comment: no smokers in household   Substance Use Topics     Alcohol use: No     Comment: none since      Family History   Problem Relation Age of Onset     Substance Abuse Father      Cancer Daughter      Depression Daughter      Hypertension Mother            Reviewed and updated  as needed this visit by Provider  Allergies  Meds  Problems         Review of Systems   ROS COMP: CONSTITUTIONAL: NEGATIVE for fever, chills, change in weight  RESP: NEGATIVE for significant cough or shortness of breath   CV: NEGATIVE for chest pain, palpitations or peripheral edema      Objective    BP (!) 148/72 (BP Location: Right arm, Patient Position: Chair, Cuff Size: Adult Regular)   Pulse 51   Temp 98.2  F (36.8  C) (Temporal)   Resp 12   Wt 45.8 kg (101 lb)   LMP 02/01/2011   SpO2 96%   BMI 21.67 kg/m    Body mass index is 21.67 kg/m .  Physical Exam   Gen: no apparent distress  NECK: no adenopathy, no asymmetry, no masses  Chest: clear to auscultation without wheeze, rale or rhonchi  Cor: regular rate and rhythm without murmur  ABDOMEN: soft, nontender, no masses and bowel sounds normal  Ext: warm and dry without edema  Back: Severe kyphoscoliosis  Skin: Under her breasts and in her umbilicus there are flat, pink rash without vesicle or pustule  Psych: Alert and oriented times 3; coherent speech, normal   rate and volume, able to articulate logical thoughts, able   to abstract reason, no tangential thoughts, no hallucinations   or delusions  Her affect is neutral        Assessment & Plan     1. Abnormal LFTs  Her AST and ALT had been in the low to mid 100s.  They now have improved although not back to normal.  Will recheck today.  Because of her abnormal liver tests multiple medications had been adjusted.  She is asking to restart.    - Comprehensive metabolic panel    2. Hyperlipidemia, unspecified hyperlipidemia type  Cardiology would like her back on her statin.  Will restart her atorvastatin at 10 mg daily.  We will recheck liver tests in a month.    - atorvastatin (LIPITOR) 10 MG tablet; Take 1 tablet (10 mg) by mouth daily  Dispense: 90 tablet; Refill: 3  - Comprehensive metabolic panel    3. Mild major depression (H)  She did not like that she felt that she had weight gain in her stomach  with the Remeron and stopped it after taking it only for 6 days.  She felt her mood had worsened with a decrease in her fluoxetine.  We will increase her fluoxetine from 20 mg to 40 mg.  We will recheck her labs in 1 month.    - FLUoxetine (PROZAC) 40 MG capsule; Take 1 capsule (40 mg) by mouth daily  Dispense: 90 capsule; Refill: 0    4. Other chronic pain  At this time we will continue her on the oxycodone without adding Tylenol back in.  Refill was given and we will follow-up in 1 month.    - oxyCODONE (ROXICODONE) 5 MG tablet; Take 1 tablet (5 mg) by mouth every 4 hours as needed for pain  Dispense: 180 tablet; Refill: 0    5. Cutaneous candidiasis  Will use clotrimazole for her skin.    - clotrimazole (LOTRIMIN) 1 % external cream; Apply topically 2 times daily  Dispense: 30 g; Refill: 0     Return in about 4 weeks (around 9/17/2019) for Routine Visit.    Silvia Matthews MD  [facility]

## 2019-08-20 ENCOUNTER — TELEPHONE (OUTPATIENT)
Dept: FAMILY MEDICINE | Facility: OTHER | Age: 79
End: 2019-08-20

## 2019-08-20 ENCOUNTER — OFFICE VISIT (OUTPATIENT)
Dept: FAMILY MEDICINE | Facility: OTHER | Age: 79
End: 2019-08-20
Payer: COMMERCIAL

## 2019-08-20 VITALS
TEMPERATURE: 98.2 F | BODY MASS INDEX: 21.67 KG/M2 | OXYGEN SATURATION: 96 % | SYSTOLIC BLOOD PRESSURE: 148 MMHG | DIASTOLIC BLOOD PRESSURE: 72 MMHG | HEART RATE: 51 BPM | RESPIRATION RATE: 12 BRPM | WEIGHT: 101 LBS

## 2019-08-20 DIAGNOSIS — B37.2 CUTANEOUS CANDIDIASIS: ICD-10-CM

## 2019-08-20 DIAGNOSIS — R79.89 ABNORMAL LFTS: Primary | ICD-10-CM

## 2019-08-20 DIAGNOSIS — E87.6 HYPOPOTASSEMIA: ICD-10-CM

## 2019-08-20 DIAGNOSIS — E78.5 HYPERLIPIDEMIA, UNSPECIFIED HYPERLIPIDEMIA TYPE: ICD-10-CM

## 2019-08-20 DIAGNOSIS — G89.29 OTHER CHRONIC PAIN: ICD-10-CM

## 2019-08-20 DIAGNOSIS — F32.0 MILD MAJOR DEPRESSION (H): ICD-10-CM

## 2019-08-20 LAB
ALBUMIN SERPL-MCNC: 3.4 G/DL (ref 3.4–5)
ALP SERPL-CCNC: 65 U/L (ref 40–150)
ALT SERPL W P-5'-P-CCNC: 77 U/L (ref 0–50)
ANION GAP SERPL CALCULATED.3IONS-SCNC: 6 MMOL/L (ref 3–14)
AST SERPL W P-5'-P-CCNC: 64 U/L (ref 0–45)
BILIRUB SERPL-MCNC: 0.5 MG/DL (ref 0.2–1.3)
BUN SERPL-MCNC: 17 MG/DL (ref 7–30)
CALCIUM SERPL-MCNC: 9 MG/DL (ref 8.5–10.1)
CHLORIDE SERPL-SCNC: 97 MMOL/L (ref 94–109)
CO2 SERPL-SCNC: 35 MMOL/L (ref 20–32)
CREAT SERPL-MCNC: 0.84 MG/DL (ref 0.52–1.04)
GFR SERPL CREATININE-BSD FRML MDRD: 67 ML/MIN/{1.73_M2}
GLUCOSE SERPL-MCNC: 107 MG/DL (ref 70–99)
POTASSIUM SERPL-SCNC: 3.3 MMOL/L (ref 3.4–5.3)
PROT SERPL-MCNC: 7.8 G/DL (ref 6.8–8.8)
SODIUM SERPL-SCNC: 138 MMOL/L (ref 133–144)

## 2019-08-20 PROCEDURE — 99214 OFFICE O/P EST MOD 30 MIN: CPT | Performed by: FAMILY MEDICINE

## 2019-08-20 PROCEDURE — 80053 COMPREHEN METABOLIC PANEL: CPT | Performed by: FAMILY MEDICINE

## 2019-08-20 PROCEDURE — 36415 COLL VENOUS BLD VENIPUNCTURE: CPT | Performed by: FAMILY MEDICINE

## 2019-08-20 RX ORDER — OXYCODONE HYDROCHLORIDE 5 MG/1
5 TABLET ORAL EVERY 4 HOURS PRN
Qty: 180 TABLET | Refills: 0 | Status: SHIPPED | OUTPATIENT
Start: 2019-08-20 | End: 2019-09-17

## 2019-08-20 RX ORDER — ATORVASTATIN CALCIUM 10 MG/1
10 TABLET, FILM COATED ORAL DAILY
Qty: 90 TABLET | Refills: 3 | Status: SHIPPED | OUTPATIENT
Start: 2019-08-20 | End: 2020-08-07

## 2019-08-20 RX ORDER — FLUOXETINE 40 MG/1
40 CAPSULE ORAL DAILY
Qty: 90 CAPSULE | Refills: 0 | Status: SHIPPED | OUTPATIENT
Start: 2019-08-20 | End: 2019-11-22

## 2019-08-20 RX ORDER — POTASSIUM CHLORIDE 1500 MG/1
20 TABLET, EXTENDED RELEASE ORAL 2 TIMES DAILY
Qty: 60 TABLET | Refills: 0 | Status: SHIPPED | OUTPATIENT
Start: 2019-08-20 | End: 2019-09-30

## 2019-08-20 RX ORDER — CLOTRIMAZOLE 1 %
CREAM (GRAM) TOPICAL 2 TIMES DAILY
Qty: 30 G | Refills: 0 | Status: SHIPPED | OUTPATIENT
Start: 2019-08-20 | End: 2020-04-24

## 2019-08-20 ASSESSMENT — ANXIETY QUESTIONNAIRES
7. FEELING AFRAID AS IF SOMETHING AWFUL MIGHT HAPPEN: SEVERAL DAYS
1. FEELING NERVOUS, ANXIOUS, OR ON EDGE: SEVERAL DAYS
3. WORRYING TOO MUCH ABOUT DIFFERENT THINGS: NOT AT ALL
6. BECOMING EASILY ANNOYED OR IRRITABLE: MORE THAN HALF THE DAYS
5. BEING SO RESTLESS THAT IT IS HARD TO SIT STILL: NOT AT ALL
GAD7 TOTAL SCORE: 4
2. NOT BEING ABLE TO STOP OR CONTROL WORRYING: NOT AT ALL

## 2019-08-20 ASSESSMENT — PATIENT HEALTH QUESTIONNAIRE - PHQ9
SUM OF ALL RESPONSES TO PHQ QUESTIONS 1-9: 6
5. POOR APPETITE OR OVEREATING: NOT AT ALL

## 2019-08-20 NOTE — TELEPHONE ENCOUNTER
----- Message from Silvia Matthews MD sent at 8/20/2019  4:05 PM CDT -----  Her potassium is a little low, let's restart potassium supplement and recheck her labs next month.  Her liver labs are still elevated, but stable.    Electronically signed by Silvia Matthews MD on 8/20/2019

## 2019-08-21 ASSESSMENT — ANXIETY QUESTIONNAIRES: GAD7 TOTAL SCORE: 4

## 2019-09-03 NOTE — PROGRESS NOTES
Subjective     Katherin Jonas is a 78 year old female who presents to clinic today for the following health issues:    HPI       Hospital Follow-up Visit:    Hospital/Nursing Home/IP Rehab Facility: [facility]   Date of Admission: 9/10/19  Date of Discharge: 9/11/19  Reason(s) for Admission: Bradycardia - pacemaker placed 9/11/19            Problems taking medications regularly:  None       Medication changes since discharge: Stopped Furosemide. Was prescribed Levothyroxine but has not started taking it yet       Problems adhering to non-medication therapy:  None    Summary of hospitalization:  [facility]  Patient was seen in the emergency department for frequent falls.  She was found to have symptomatic bradycardia and was transferred to [facility] for pacemaker insertion.  She was found to have mild thrombocytopenia.  She also had acute kidney injury which was improving and her Lasix was held.  Diagnostic Tests/Treatments reviewed.  Follow up needed: TSH, platelets  Other Healthcare Providers Involved in Patient s Care:         Specialist appointment - Cardiology  Update since discharge: stable.  Still feeling tired.  Has not had any further falls.  Has not started her thyroid medication yet.  She is wondering if she needs her Lasix.  She did not take any of her blood pressure medications today as she was fasting and did not want to take her medications on an empty stomach.    Post Discharge Medication Reconciliation: discharge medications reconciled and changed, per note/orders (see AVS).  Plan of care communicated with patient and family          Patient Active Problem List   Diagnosis     Esophageal reflux     Carotid atherosclerosis     CKD (chronic kidney disease) stage 3, GFR 30-59 ml/min (H)     Insomnia     Mild major depression (H)     Advanced directives, counseling/discussion     Essential hypertension     Hyperlipidemia, unspecified     Scoliosis      Age-related osteoporosis without current pathological fracture     Postherpetic neuralgia     Other chronic pain     COPD (chronic obstructive pulmonary disease) (H)     Paroxysmal atrial fibrillation (H)     Anxiety     Anticoagulant long-term use     Heart failure with preserved left ventricular function (HFpEF) (H)     Hypothyroid     Thrombocytopenia (H)     Cardiac pacemaker in situ     Past Surgical History:   Procedure Laterality Date     C FULL ROUT OBSTE CARE, DELIV  1964     C FULL ROUT OBSTE CARE, DELIV       C FULL ROUT OBSTE CARE, DELIV       C TOTAL KNEE ARTHROPLASTY  1997    left     C TOTAL KNEE ARTHROPLASTY  2005    right     C TREAT ECTOPIC PREG,RMV TUBE/OVARY  1967    left     COLONOSCOPY  11    [facility] REPAIR OF NASAL SEPTUM         Social History     Tobacco Use     Smoking status: Former Smoker     Last attempt to quit: 1979     Years since quittin.7     Smokeless tobacco: Never Used     Tobacco comment: no smokers in household   Substance Use Topics     Alcohol use: No     Comment: none since      Family History   Problem Relation Age of Onset     Substance Abuse Father      Cancer Daughter      Depression Daughter      Hypertension Mother            Reviewed and updated as needed this visit by Provider  Allergies  Meds  Problems         Review of Systems   CONSTITUTIONAL: NEGATIVE for fever, chills, change in weight  ENT/MOUTH: NEGATIVE for ear, mouth and throat problems  RESP: NEGATIVE for significant cough or shortness of breath  CV: NEGATIVE for chest pain, palpitations or peripheral edema      Objective    BP (!) 162/68 (BP Location: Right arm, Patient Position: Chair, Cuff Size: Adult Regular)   Pulse 67   Temp 98.8  F (37.1  C) (Temporal)   Resp 14   Wt 45.8 kg (101 lb)   LMP 2011   SpO2 96%   BMI 21.67 kg/m    Body mass index is 21.67 kg/m .  Physical Exam   Gen: no apparent  distress  Chest: clear to auscultation without wheeze, rale or rhonchi, incision is clean, dry and intact.  No erythema or discharge.  Cor: regular rate and rhythm without murmur  Ext: warm and dry without edema  Psych: Alert and oriented times 3; coherent speech, normal   rate and volume, able to articulate logical thoughts, able   to abstract reason, no tangential thoughts, no hallucinations   or delusions  Her affect is neutral        Assessment & Plan     1. Cardiac pacemaker in situ  States she has an appointment for her pacemaker next week.    2. Hypothyroidism, unspecified type  Discussed that this could be contributing to her fatigue.  Encouraged her to start taking her medication.  Will recheck in 2 months.    - levothyroxine (SYNTHROID/LEVOTHROID) 25 MCG tablet; Take 1 tablet (25 mcg) by mouth daily  Dispense: 30 tablet; Refill: 0    3. Thrombocytopenia (H)  We will recheck today.    - CBC with platelets    4. Dermatitis  She uses triamcinolone as needed for some dry and itchy lesions on her chest.  Refills given.    - triamcinolone (KENALOG) 0.1 % external cream; APPLY SPARINGLY EXTERNALLY TO THE AFFECTED AREA THREE TIMES DAILY FOR 14 DAYS  Dispense: 15 g; Refill: 0    5. Other chronic pain  This is stable.  Refills given.  We will follow-up in 2 months.    - oxyCODONE (ROXICODONE) 5 MG tablet; Take 1 tablet (5 mg) by mouth every 4 hours as needed for pain  Dispense: 180 tablet; Refill: 0    6. CKD (chronic kidney disease) stage 3, GFR 30-59 ml/min (H)  Will recheck.  She is not having any edema.  We will keep her off the diuretic at this time.  - Basic metabolic panel    7. Hypertension  Patient has not taken any of her blood pressure medications today.  Encouraged her to restart these.  She will check her blood pressure at home.  She also has an appointment with endocrinology later this month so her blood pressure can get rechecked when she is on her medications.  If her blood pressure is running  elevated may then consider restarting her Lasix.    Return in about 2 months (around 11/17/2019) for thryoid, pain, blood pressure.    Silvia Matthews MD  [facility]

## 2019-09-10 ENCOUNTER — TRANSFERRED RECORDS (OUTPATIENT)
Dept: HEALTH INFORMATION MANAGEMENT | Facility: CLINIC | Age: 79
End: 2019-09-10

## 2019-09-11 ENCOUNTER — TRANSFERRED RECORDS (OUTPATIENT)
Dept: HEALTH INFORMATION MANAGEMENT | Facility: CLINIC | Age: 79
End: 2019-09-11

## 2019-09-16 ENCOUNTER — TELEPHONE (OUTPATIENT)
Dept: FAMILY MEDICINE | Facility: OTHER | Age: 79
End: 2019-09-16

## 2019-09-16 NOTE — TELEPHONE ENCOUNTER
Reason for Call:  Other verbal orders     Detailed comments: Skilled nursing visit 2 times a week for 2 weeks, 1 time for 2 weeks, 2 prn visits and PT to eval and treat - please advise.     Phone Number Patient can be reached at: Other phone number:  [phone]    Best Time: any     Can we leave a detailed message on this number? YES    Call taken on 9/16/2019 at 11:16 AM by Matilda Michael

## 2019-09-17 ENCOUNTER — OFFICE VISIT (OUTPATIENT)
Dept: FAMILY MEDICINE | Facility: OTHER | Age: 79
End: 2019-09-17
Payer: COMMERCIAL

## 2019-09-17 VITALS
HEART RATE: 67 BPM | WEIGHT: 101 LBS | BODY MASS INDEX: 21.67 KG/M2 | DIASTOLIC BLOOD PRESSURE: 68 MMHG | SYSTOLIC BLOOD PRESSURE: 162 MMHG | RESPIRATION RATE: 14 BRPM | OXYGEN SATURATION: 96 % | TEMPERATURE: 98.8 F

## 2019-09-17 DIAGNOSIS — I10 ESSENTIAL HYPERTENSION: ICD-10-CM

## 2019-09-17 DIAGNOSIS — D72.819 LEUKOPENIA, UNSPECIFIED TYPE: ICD-10-CM

## 2019-09-17 DIAGNOSIS — D69.6 THROMBOCYTOPENIA (H): ICD-10-CM

## 2019-09-17 DIAGNOSIS — G89.29 OTHER CHRONIC PAIN: ICD-10-CM

## 2019-09-17 DIAGNOSIS — Z95.0 CARDIAC PACEMAKER IN SITU: Primary | ICD-10-CM

## 2019-09-17 DIAGNOSIS — L30.9 DERMATITIS: ICD-10-CM

## 2019-09-17 DIAGNOSIS — E03.9 HYPOTHYROIDISM, UNSPECIFIED TYPE: ICD-10-CM

## 2019-09-17 DIAGNOSIS — N18.30 CKD (CHRONIC KIDNEY DISEASE) STAGE 3, GFR 30-59 ML/MIN (H): ICD-10-CM

## 2019-09-17 PROBLEM — R79.89 ELEVATED TSH: Status: ACTIVE | Noted: 2019-09-11

## 2019-09-17 LAB
ANION GAP SERPL CALCULATED.3IONS-SCNC: 7 MMOL/L (ref 3–14)
BUN SERPL-MCNC: 11 MG/DL (ref 7–30)
CALCIUM SERPL-MCNC: 9.1 MG/DL (ref 8.5–10.1)
CHLORIDE SERPL-SCNC: 103 MMOL/L (ref 94–109)
CO2 SERPL-SCNC: 31 MMOL/L (ref 20–32)
CREAT SERPL-MCNC: 0.68 MG/DL (ref 0.52–1.04)
ERYTHROCYTE [DISTWIDTH] IN BLOOD BY AUTOMATED COUNT: 13.2 % (ref 10–15)
GFR SERPL CREATININE-BSD FRML MDRD: 83 ML/MIN/{1.73_M2}
GLUCOSE SERPL-MCNC: 98 MG/DL (ref 70–99)
HCT VFR BLD AUTO: 35.6 % (ref 35–47)
HGB BLD-MCNC: 11.8 G/DL (ref 11.7–15.7)
MCH RBC QN AUTO: 31 PG (ref 26.5–33)
MCHC RBC AUTO-ENTMCNC: 33.1 G/DL (ref 31.5–36.5)
MCV RBC AUTO: 93 FL (ref 78–100)
PLATELET # BLD AUTO: 127 10E9/L (ref 150–450)
POTASSIUM SERPL-SCNC: 3.8 MMOL/L (ref 3.4–5.3)
RBC # BLD AUTO: 3.81 10E12/L (ref 3.8–5.2)
SODIUM SERPL-SCNC: 141 MMOL/L (ref 133–144)
WBC # BLD AUTO: 2.9 10E9/L (ref 4–11)

## 2019-09-17 PROCEDURE — 99214 OFFICE O/P EST MOD 30 MIN: CPT | Performed by: FAMILY MEDICINE

## 2019-09-17 PROCEDURE — 80048 BASIC METABOLIC PNL TOTAL CA: CPT | Performed by: FAMILY MEDICINE

## 2019-09-17 PROCEDURE — 85027 COMPLETE CBC AUTOMATED: CPT | Performed by: FAMILY MEDICINE

## 2019-09-17 PROCEDURE — 36415 COLL VENOUS BLD VENIPUNCTURE: CPT | Performed by: FAMILY MEDICINE

## 2019-09-17 RX ORDER — LEVOTHYROXINE SODIUM 25 UG/1
25 TABLET ORAL DAILY
Qty: 30 TABLET | Refills: 0 | Status: SHIPPED | OUTPATIENT
Start: 2019-09-17 | End: 2019-10-09

## 2019-09-17 RX ORDER — OXYCODONE HYDROCHLORIDE 5 MG/1
5 TABLET ORAL EVERY 4 HOURS PRN
Qty: 180 TABLET | Refills: 0 | Status: SHIPPED | OUTPATIENT
Start: 2019-09-17 | End: 2019-10-22

## 2019-09-17 RX ORDER — TRIAMCINOLONE ACETONIDE 1 MG/G
CREAM TOPICAL
Qty: 15 G | Refills: 0 | Status: SHIPPED | OUTPATIENT
Start: 2019-09-17 | End: 2020-08-07

## 2019-09-17 RX ORDER — LEVOTHYROXINE SODIUM 25 UG/1
25 TABLET ORAL
COMMUNITY
Start: 2019-09-13 | End: 2019-09-17

## 2019-09-20 ENCOUNTER — TELEPHONE (OUTPATIENT)
Dept: FAMILY MEDICINE | Facility: OTHER | Age: 79
End: 2019-09-20

## 2019-09-20 ENCOUNTER — NURSE TRIAGE (OUTPATIENT)
Dept: NURSING | Facility: CLINIC | Age: 79
End: 2019-09-20

## 2019-09-20 NOTE — TELEPHONE ENCOUNTER
Ariane Hall is her daughter. She is not on C2C but spouse Kishan is. Please call him and leave a message. Or Ariane said to call home phone and let it ring. Someone will .

## 2019-09-20 NOTE — TELEPHONE ENCOUNTER
Reason for Call:  Other Message    Detailed comments: Blood pressure 9/19 was 134 / 60 and blood pressure on 9/20 was 125 / 66. Both were taken in the morning before meds.     Phone Number Patient can be reached at: Other phone number:  [phone]    Best Time: any    Can we leave a detailed message on this number? YES    Call taken on 9/20/2019 at 1:51 PM by Jennifer Ledezma

## 2019-09-20 NOTE — TELEPHONE ENCOUNTER
----- Message from Silvia Matthews MD sent at 9/20/2019  4:31 PM CDT -----  Kidney function and potassium are normal.    Her white blood count is slightly low, let's recheck this next week.    Electronically signed by Silvia Matthews MD on 9/20/2019

## 2019-09-20 NOTE — TELEPHONE ENCOUNTER
Left message for patient/Ariane Hall to call back. Please see message below.  Not sure who Ariane Hall is - is this with a group home or? She is not on c2c.  Sanjuanita Fiore, CMA

## 2019-09-20 NOTE — TELEPHONE ENCOUNTER
"Kishan calling returning call to clinic.     \"Kidney function and potassium are normal.   Her white blood count is slightly low, let's recheck this next week.  Electronically signed by Silvia Matthews MD on 9/20/2019\"    Let him know order is in for the lab can go to any [facility] lab, verbalizes understanding will take wife next week.     Areli Ramos RN  [facility]    "

## 2019-09-23 ENCOUNTER — TELEPHONE (OUTPATIENT)
Dept: FAMILY MEDICINE | Facility: OTHER | Age: 79
End: 2019-09-23

## 2019-09-23 NOTE — TELEPHONE ENCOUNTER
Reason for Call: Request for an order or referral:    Order or referral being requested: [facility]    Date needed: as soon as possible    Has the patient been seen by the PCP for this problem? YES    Additional comments: Therapist is recommending 2x's a week for 4 weeks to work on balance, walking, and strengthening balance    Phone number Patient can be reached at:  Other phone number:  [phone]    Best Time:  any    Can we leave a detailed message on this number?  YES    Call taken on 9/23/2019 at 4:06 PM by Alana Stover

## 2019-09-25 DIAGNOSIS — D72.819 LEUKOPENIA, UNSPECIFIED TYPE: ICD-10-CM

## 2019-09-25 LAB
BASOPHILS # BLD AUTO: 0 10E9/L (ref 0–0.2)
BASOPHILS NFR BLD AUTO: 0.9 %
DIFFERENTIAL METHOD BLD: ABNORMAL
EOSINOPHIL # BLD AUTO: 0 10E9/L (ref 0–0.7)
EOSINOPHIL NFR BLD AUTO: 0.9 %
ERYTHROCYTE [DISTWIDTH] IN BLOOD BY AUTOMATED COUNT: 13.5 % (ref 10–15)
HCT VFR BLD AUTO: 37.1 % (ref 35–47)
HGB BLD-MCNC: 12.2 G/DL (ref 11.7–15.7)
IMM GRANULOCYTES # BLD: 0 10E9/L (ref 0–0.4)
IMM GRANULOCYTES NFR BLD: 0.3 %
LYMPHOCYTES # BLD AUTO: 0.6 10E9/L (ref 0.8–5.3)
LYMPHOCYTES NFR BLD AUTO: 18.6 %
MCH RBC QN AUTO: 30.7 PG (ref 26.5–33)
MCHC RBC AUTO-ENTMCNC: 32.9 G/DL (ref 31.5–36.5)
MCV RBC AUTO: 93 FL (ref 78–100)
MONOCYTES # BLD AUTO: 0.4 10E9/L (ref 0–1.3)
MONOCYTES NFR BLD AUTO: 11 %
NEUTROPHILS # BLD AUTO: 2.4 10E9/L (ref 1.6–8.3)
NEUTROPHILS NFR BLD AUTO: 68.3 %
PLATELET # BLD AUTO: 170 10E9/L (ref 150–450)
RBC # BLD AUTO: 3.98 10E12/L (ref 3.8–5.2)
WBC # BLD AUTO: 3.5 10E9/L (ref 4–11)

## 2019-09-25 PROCEDURE — 85025 COMPLETE CBC W/AUTO DIFF WBC: CPT | Performed by: FAMILY MEDICINE

## 2019-09-25 PROCEDURE — 36415 COLL VENOUS BLD VENIPUNCTURE: CPT | Performed by: FAMILY MEDICINE

## 2019-09-27 ENCOUNTER — TELEPHONE (OUTPATIENT)
Dept: FAMILY MEDICINE | Facility: OTHER | Age: 79
End: 2019-09-27

## 2019-09-27 NOTE — TELEPHONE ENCOUNTER
Reason for Call:  Form, our goal is to have forms completed with 72 hours, however, some forms may require a visit or additional information.    Type of letter, form or note:  medical    Who is the form from?: [facility] (if other please explain)    Where did the form come from: form was faxed in    What clinic location was the form placed at?: [facility] - [phone]    Where the form was placed: Doc Box    What number is listed as a contact on the form?: [phone]       Additional comments: Please sign and return to [phone]    Call taken on 9/27/2019 at 9:59 AM by Jennifer Ledezma

## 2019-09-30 ENCOUNTER — TELEPHONE (OUTPATIENT)
Dept: FAMILY MEDICINE | Facility: OTHER | Age: 79
End: 2019-09-30

## 2019-09-30 ENCOUNTER — OFFICE VISIT (OUTPATIENT)
Dept: ENDOCRINOLOGY | Facility: CLINIC | Age: 79
End: 2019-09-30
Attending: FAMILY MEDICINE
Payer: COMMERCIAL

## 2019-09-30 VITALS — SYSTOLIC BLOOD PRESSURE: 156 MMHG | OXYGEN SATURATION: 98 % | HEART RATE: 82 BPM | DIASTOLIC BLOOD PRESSURE: 81 MMHG

## 2019-09-30 DIAGNOSIS — M81.0 AGE-RELATED OSTEOPOROSIS WITHOUT CURRENT PATHOLOGICAL FRACTURE: Primary | ICD-10-CM

## 2019-09-30 DIAGNOSIS — N18.30 CKD (CHRONIC KIDNEY DISEASE) STAGE 3, GFR 30-59 ML/MIN (H): ICD-10-CM

## 2019-09-30 DIAGNOSIS — E87.6 HYPOPOTASSEMIA: ICD-10-CM

## 2019-09-30 PROCEDURE — 99214 OFFICE O/P EST MOD 30 MIN: CPT | Performed by: INTERNAL MEDICINE

## 2019-09-30 NOTE — PATIENT INSTRUCTIONS
- plan for Prolia injection every 6 months  - return in 1 year with bone density at [facility]    If you have any questions, please do not hesitate to call [facility] at [phone] and ask for Endocrinology clinic.    Sincerely,    Cecelia Galeano MD  Endocrinology    If you need to cancel or reschedule your prolia injection please call [phone].

## 2019-09-30 NOTE — TELEPHONE ENCOUNTER
Patients  calling concerned. She recently had pacemaker implanted and has help with her heart rate, but is still having balance problems.     Would like a call from team to see what he can do and possibly a work in tomorrow with PCP.         [phone]    Ok to leave message

## 2019-09-30 NOTE — TELEPHONE ENCOUNTER
Our goal is to have forms completed with 72 hours, however some forms may require a visit or additional information.    Who is the form from?: [facility] (if other please explain)  Where the form came from: forms bin  What clinic location was the form placed at?: [place]  Where the form was placed: forms bin   What number is listed as a contact on the form?: [phone]    Phone call message- patient request for a letter, form or note:    Date needed: as soon as possible  Please fax to [phone]  Has the patient signed a consent form for release of information? NO    Additional comments:     Call taken on 9/30/2019 at 10:44 AM by Gina Last    Type of letter, form or note: medical

## 2019-09-30 NOTE — NURSING NOTE
Katherin Jonas's goals for this visit include:   Chief Complaint   Patient presents with     New Patient     Osteoporosis     She requests these members of her care team be copied on today's visit information: Yes    PCP: Silvia Matthews    Referring Provider:  Silvia Matthews MD  [address] [place] [id number]    BP (!) 156/81 (BP Location: Left arm, Patient Position: Sitting, Cuff Size: Adult Regular)   Pulse 82   LMP 02/01/2011   SpO2 98%     Do you need any medication refills at today's visit? No
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
## 2019-09-30 NOTE — TELEPHONE ENCOUNTER
I spoke with Kishan.   Her balance issues have not improved and he would like her to be rechecked.     Next 5 appointments (look out 90 days)    Oct 08, 2019 10:40 AM CDT  Office Visit with Silvia Matthews MD  [facility] ([facility]) [address] [place] [id number]  [phone]   Nov 22, 2019  9:20 AM CST  Office Visit with Silvia Matthews MD  [facility] ([facility]) [address] [place] [id number]  [phone]        Smiley Shannon, RN, BSN

## 2019-10-01 RX ORDER — POTASSIUM CHLORIDE 1500 MG/1
TABLET, EXTENDED RELEASE ORAL
Qty: 60 TABLET | Refills: 1 | Status: SHIPPED | OUTPATIENT
Start: 2019-10-01 | End: 2019-11-22

## 2019-10-01 NOTE — TELEPHONE ENCOUNTER
Pending Prescriptions:                       Disp   Refills    potassium chloride ER (K-DUR/KLOR-CON M) *60 tab*0            Sig: TAKE ONE TABLET BY MOUTH TWICE A DAY    Prescription approved per [facility] Refill Protocol.    Jackie Harrison, RN, BSN

## 2019-10-01 NOTE — PROGRESS NOTES
Subjective     Katherin Jonas is a 78 year old female who presents to clinic today for the following health issues:    HPI   Concern - balance issues  Onset: over a year    Description:   Patient reports needing to watch her step when she walks otherwise she will stumble    Intensity: moderate    Progression of Symptoms:  worsening    Accompanying Signs & Symptoms:  Lightheaded, tremor in hands, fatigue      Previous history of similar problem:   yes    Precipitating factors:   Worsened by: being fatigued     Alleviating factors:  Improved by: using her walker    Therapies Tried and outcome: walker     Still has Physical Therapy, coming out twice a week, last visit will be next week.  Nurse comes out once a week, this week will be her last visit.  She is wondering how much of this is related to having a slow recovery following her pacemaker insertion.  She had thought she would feel much more energy after having her pacemaker.  She states that she is not sleeping as well on the 2 mg of Lunesta as compared to 3 mg.  She uses a walker in the house but does not remember this all the time.  When she is out in public she hold onto her  while walking.    Home blood pressures are ranging in the 140s over 70s.  Once in a while the systolic gets into the 120s but does not get any lower than this.  Her pulse remains in the 70s.    Patient Active Problem List   Diagnosis     Esophageal reflux     Carotid atherosclerosis     CKD (chronic kidney disease) stage 3, GFR 30-59 ml/min (H)     Insomnia     Mild major depression (H)     Advanced directives, counseling/discussion     Essential hypertension     Hyperlipidemia, unspecified     Scoliosis     Age-related osteoporosis without current pathological fracture     Postherpetic neuralgia     Other chronic pain     COPD (chronic obstructive pulmonary disease) (H)     Paroxysmal atrial fibrillation (H)     Anxiety     Anticoagulant long-term use     Heart failure with  preserved left ventricular function (HFpEF) (H)     Hypothyroid     Thrombocytopenia (H)     Cardiac pacemaker in situ     Past Surgical History:   Procedure Laterality Date     C FULL ROUT OBSTE CARE, DELIV       C FULL ROUT OBSTE CARE, DELIV       C FULL ROUT OBSTE CARE, DELIV       C TOTAL KNEE ARTHROPLASTY  1997    left     C TOTAL KNEE ARTHROPLASTY  2005    right     C TREAT ECTOPIC PREG,RMV TUBE/OVARY  1967    left     COLONOSCOPY  11    [facility]     HC REPAIR OF NASAL SEPTUM         Social History     Tobacco Use     Smoking status: Former Smoker     Last attempt to quit: 1979     Years since quittin.7     Smokeless tobacco: Never Used     Tobacco comment: no smokers in household   Substance Use Topics     Alcohol use: No     Comment: none since      Family History   Problem Relation Age of Onset     Substance Abuse Father      Cancer Daughter      Depression Daughter      Hypertension Mother            Reviewed and updated as needed this visit by Provider  Allergies  Meds  Problems         Review of Systems   CONSTITUTIONAL: NEGATIVE for fever, chills, change in weight  RESP: NEGATIVE for significant cough  CV: NEGATIVE for chest pain, palpitations or peripheral edema      Objective    /70   Pulse 71   Temp 98.1  F (36.7  C) (Temporal)   Resp 12   Wt 44.2 kg (97 lb 8 oz)   LMP 2011   SpO2 99%   BMI 20.91 kg/m    Body mass index is 20.91 kg/m .  Physical Exam   Gen: no apparent distress  Back: Severe kyphoscoliosis  Neck: The neck is supple and free of adenopathy or masses, the thyroid is normal without enlargement or nodules.  Chest/CV: S1 and S2 normal, no murmurs, clicks, gallops or rubs. Regular rate and rhythm. Chest is clear; no wheezes or rales. No edema.  Neuro: Mild resting tremor  Psych: Alert and oriented times 3; coherent speech, normal   rate and volume, able to articulate logical  thoughts, able   to abstract reason, no tangential thoughts, no hallucinations   or delusions  Her affect is neutral        Assessment & Plan     1. Poor balance  Discussed that this is most likely multifactorial.  She does have severe kyphoscoliosis, chronic pain, recent pacemaker implementation, overall debility.  She is currently receiving home physical therapy which she believes will end next week.  She tries to do the exercises on the days that the physical therapist is not there.  Encouraged her to use the walker.  Discussed she is on several medications that can affect her fatigue and balance.  Discussed that Lunesta is 1 of these medications and because of this I do not feel comfortable increasing her dose to 3 mg.  Discussed gabapentin and oxycodone can also affect balance and fatigue.  She is using the gabapentin for postherpetic neuralgia.  Will try decreasing this dose to see if it helps with her pain control but it improves some of her symptoms.  She Reyes has an appointment next month and will follow-up then.    2. Essential hypertension  Blood pressure appears controlled.  Discussed that hypo-tension could cause some dizziness but it does not appear her systolic readings ever get lower than 120.  Refills are given.  She follows up with electrophysiologist in a few months.    - amLODIPine (NORVASC) 2.5 MG tablet; Take 1 tablet (2.5 mg) by mouth daily  Dispense: 90 tablet; Refill: 3  - metoprolol succinate ER (TOPROL-XL) 25 MG 24 hr tablet; Take 1 tablet (25 mg) by mouth daily  Dispense: 90 tablet; Refill: 3  - Basic metabolic panel    3. Postherpetic neuralgia  We will decrease her gabapentin from 300 mg 4 times a day to 3 times a day.  We will see if this helps control her pain but improved some of her balance and fatigue.  She will follow-up next month as already scheduled.    - gabapentin (NEURONTIN) 300 MG capsule; Take 1 capsule (300 mg) by mouth 3 times daily  Dispense: 270 capsule; Refill:  0    4. Leukopenia, unspecified type  Unclear etiology but this was improving.  Will recheck today.    - CBC with platelets    5. Hypothyroidism, unspecified type  Discussed that having poorly controlled hypothyroidism could also contribute to her fatigue.  Will check a TSH.  She has been on her low-dose supplement for the last 6 weeks.    - TSH WITH FREE T4 REFLEX       Silvia Matthews MD  [facility]

## 2019-10-02 ENCOUNTER — TELEPHONE (OUTPATIENT)
Dept: ENDOCRINOLOGY | Facility: CLINIC | Age: 79
End: 2019-10-02

## 2019-10-02 NOTE — TELEPHONE ENCOUNTER
Patient's  verbalizes understanding and confirms that patient is scheduled 10/10/19 for Prolia injection.      Yola Mcmillan, RN  Endocrine Care Coordinator  [facility]

## 2019-10-02 NOTE — TELEPHONE ENCOUNTER
----- Message from Kirsten Ledezma sent at 10/1/2019 10:06 AM CDT -----  Regarding: RE: mika Aceves,     This is good to go from my end.     Thanks,   Kirsten Ledezma  Infusion  Supervisor  [facility]  [email or web address]  [email or web address]   Office: [phone]  Fax: [phone]    ----- Message -----  From: Anabelle Mcmillan, RN  Sent: 10/1/2019   9:05 AM CDT  To: Kirsten Ledezma  Subject: FW: mika Curtis,    Can I please confirm Prolia is covered before getting patient scheduled?    Thank you,  Yola Mcmillan, RN  Endocrine Care Coordinator  [facility]  ----- Message -----  From: Cecelia Galeano MD  Sent: 9/30/2019   5:04 PM CDT  To: Anabelle Mcmillan, KWAME  Subject: mika Aceves,    I could not find the MG pool any more from staff message.    Anyway, I have ordered for Prolia for this patient. Could you please make sure she is able to get this covered and scheduled?    Thanks,  Cecelia

## 2019-10-08 ENCOUNTER — OFFICE VISIT (OUTPATIENT)
Dept: FAMILY MEDICINE | Facility: OTHER | Age: 79
End: 2019-10-08
Payer: COMMERCIAL

## 2019-10-08 VITALS
BODY MASS INDEX: 20.91 KG/M2 | SYSTOLIC BLOOD PRESSURE: 124 MMHG | TEMPERATURE: 98.1 F | DIASTOLIC BLOOD PRESSURE: 70 MMHG | RESPIRATION RATE: 12 BRPM | HEART RATE: 71 BPM | OXYGEN SATURATION: 99 % | WEIGHT: 97.5 LBS

## 2019-10-08 DIAGNOSIS — E03.9 HYPOTHYROIDISM, UNSPECIFIED TYPE: ICD-10-CM

## 2019-10-08 DIAGNOSIS — B02.29 POSTHERPETIC NEURALGIA: ICD-10-CM

## 2019-10-08 DIAGNOSIS — I10 ESSENTIAL HYPERTENSION: ICD-10-CM

## 2019-10-08 DIAGNOSIS — R26.89 POOR BALANCE: Primary | ICD-10-CM

## 2019-10-08 DIAGNOSIS — D72.819 LEUKOPENIA, UNSPECIFIED TYPE: ICD-10-CM

## 2019-10-08 LAB
ANION GAP SERPL CALCULATED.3IONS-SCNC: 8 MMOL/L (ref 3–14)
BUN SERPL-MCNC: 19 MG/DL (ref 7–30)
CALCIUM SERPL-MCNC: 9.2 MG/DL (ref 8.5–10.1)
CHLORIDE SERPL-SCNC: 102 MMOL/L (ref 94–109)
CO2 SERPL-SCNC: 28 MMOL/L (ref 20–32)
CREAT SERPL-MCNC: 0.84 MG/DL (ref 0.52–1.04)
ERYTHROCYTE [DISTWIDTH] IN BLOOD BY AUTOMATED COUNT: 13.8 % (ref 10–15)
GFR SERPL CREATININE-BSD FRML MDRD: 66 ML/MIN/{1.73_M2}
GLUCOSE SERPL-MCNC: 139 MG/DL (ref 70–99)
HCT VFR BLD AUTO: 37.2 % (ref 35–47)
HGB BLD-MCNC: 12.2 G/DL (ref 11.7–15.7)
MCH RBC QN AUTO: 30.3 PG (ref 26.5–33)
MCHC RBC AUTO-ENTMCNC: 32.8 G/DL (ref 31.5–36.5)
MCV RBC AUTO: 92 FL (ref 78–100)
PLATELET # BLD AUTO: 136 10E9/L (ref 150–450)
POTASSIUM SERPL-SCNC: 4.5 MMOL/L (ref 3.4–5.3)
RBC # BLD AUTO: 4.03 10E12/L (ref 3.8–5.2)
SODIUM SERPL-SCNC: 138 MMOL/L (ref 133–144)
T4 FREE SERPL-MCNC: 0.84 NG/DL (ref 0.76–1.46)
TSH SERPL DL<=0.005 MIU/L-ACNC: 8.59 MU/L (ref 0.4–4)
WBC # BLD AUTO: 3.3 10E9/L (ref 4–11)

## 2019-10-08 PROCEDURE — 84439 ASSAY OF FREE THYROXINE: CPT | Performed by: FAMILY MEDICINE

## 2019-10-08 PROCEDURE — 99214 OFFICE O/P EST MOD 30 MIN: CPT | Performed by: FAMILY MEDICINE

## 2019-10-08 PROCEDURE — 85027 COMPLETE CBC AUTOMATED: CPT | Performed by: FAMILY MEDICINE

## 2019-10-08 PROCEDURE — 80048 BASIC METABOLIC PNL TOTAL CA: CPT | Performed by: FAMILY MEDICINE

## 2019-10-08 PROCEDURE — 84443 ASSAY THYROID STIM HORMONE: CPT | Performed by: FAMILY MEDICINE

## 2019-10-08 PROCEDURE — 36415 COLL VENOUS BLD VENIPUNCTURE: CPT | Performed by: FAMILY MEDICINE

## 2019-10-08 RX ORDER — METOPROLOL SUCCINATE 25 MG/1
25 TABLET, EXTENDED RELEASE ORAL DAILY
Qty: 90 TABLET | Refills: 3 | Status: SHIPPED | OUTPATIENT
Start: 2019-10-08 | End: 2020-01-24

## 2019-10-08 RX ORDER — GABAPENTIN 300 MG/1
300 CAPSULE ORAL 3 TIMES DAILY
Qty: 270 CAPSULE | Refills: 0 | Status: SHIPPED | OUTPATIENT
Start: 2019-10-08 | End: 2019-11-22

## 2019-10-08 RX ORDER — AMLODIPINE BESYLATE 2.5 MG/1
2.5 TABLET ORAL DAILY
Qty: 90 TABLET | Refills: 3 | Status: SHIPPED | OUTPATIENT
Start: 2019-10-08 | End: 2020-01-24

## 2019-10-09 RX ORDER — LEVOTHYROXINE SODIUM 50 UG/1
50 TABLET ORAL DAILY
Qty: 60 TABLET | Refills: 0 | Status: SHIPPED | OUTPATIENT
Start: 2019-10-09 | End: 2019-11-22

## 2019-10-10 ENCOUNTER — MEDICAL CORRESPONDENCE (OUTPATIENT)
Dept: HEALTH INFORMATION MANAGEMENT | Facility: CLINIC | Age: 79
End: 2019-10-10

## 2019-10-10 ENCOUNTER — TELEPHONE (OUTPATIENT)
Dept: FAMILY MEDICINE | Facility: OTHER | Age: 79
End: 2019-10-10

## 2019-10-10 ENCOUNTER — INFUSION THERAPY VISIT (OUTPATIENT)
Dept: INFUSION THERAPY | Facility: CLINIC | Age: 79
End: 2019-10-10
Payer: COMMERCIAL

## 2019-10-10 VITALS
OXYGEN SATURATION: 97 % | HEIGHT: 57 IN | BODY MASS INDEX: 20.9 KG/M2 | TEMPERATURE: 97.6 F | RESPIRATION RATE: 16 BRPM | HEART RATE: 88 BPM | SYSTOLIC BLOOD PRESSURE: 141 MMHG | DIASTOLIC BLOOD PRESSURE: 83 MMHG | WEIGHT: 96.9 LBS

## 2019-10-10 DIAGNOSIS — M81.0 AGE-RELATED OSTEOPOROSIS WITHOUT CURRENT PATHOLOGICAL FRACTURE: Primary | ICD-10-CM

## 2019-10-10 DIAGNOSIS — N18.30 CKD (CHRONIC KIDNEY DISEASE) STAGE 3, GFR 30-59 ML/MIN (H): ICD-10-CM

## 2019-10-10 PROCEDURE — 96372 THER/PROPH/DIAG INJ SC/IM: CPT | Performed by: PHYSICIAN ASSISTANT

## 2019-10-10 PROCEDURE — 99207 ZZC NO CHARGE NURSE ONLY: CPT

## 2019-10-10 ASSESSMENT — PAIN SCALES - GENERAL: PAINLEVEL: NO PAIN (0)

## 2019-10-10 ASSESSMENT — MIFFLIN-ST. JEOR: SCORE: 797.29

## 2019-10-10 NOTE — PROGRESS NOTES
Infusion Nursing Note:  Katherin Jonas presents today for Prolia.    Patient seen by provider today: No   present during visit today: Not Applicable.    Note: Patient assessed by Ruby NORTH RN prior to injection. Medication teaching done by pharmacist prior to injection.     Intravenous Access:  Labs drawn previously on 10/8/19.    Treatment Conditions:  Lab Results   Component Value Date     10/08/2019                   Lab Results   Component Value Date    POTASSIUM 4.5 10/08/2019           Lab Results   Component Value Date    MAG 2.1 07/29/2013            Lab Results   Component Value Date    CR 0.84 10/08/2019                   Lab Results   Component Value Date    LEIDA 9.2 10/08/2019                Lab Results   Component Value Date    BILITOTAL 0.5 08/20/2019           Lab Results   Component Value Date    ALBUMIN 3.4 08/20/2019                    Lab Results   Component Value Date    ALT 77 08/20/2019           Lab Results   Component Value Date    AST 64 08/20/2019           Post Infusion Assessment:  Patient tolerated injection without incident.  Site patent and intact, free from redness, edema or discomfort.       Discharge Plan:   Patient and/or family verbalized understanding of discharge instructions and all questions answered.  Patient discharged in stable condition accompanied by: .  Departure Mode: Ambulatory.  Patient directed to scheduling for next appt in 6 months.    Juanita Ponce LPN

## 2019-10-10 NOTE — TELEPHONE ENCOUNTER
Reason for Call:  Form, our goal is to have forms completed with 72 hours, however, some forms may require a visit or additional information.    Type of letter, form or note:  medical    Who is the form from?: [facility] (if other please explain)    Where did the form come from: form was faxed in    What clinic location was the form placed at?: [facility] - [phone]    Where the form was placed: Box Box/Folder    What number is listed as a contact on the form?: [phone]       Additional comments: Please return signed fax to [phone]    Call taken on 10/10/2019 at 9:51 AM by Jennifer Ledezma

## 2019-10-16 ENCOUNTER — TELEPHONE (OUTPATIENT)
Dept: FAMILY MEDICINE | Facility: OTHER | Age: 79
End: 2019-10-16

## 2019-10-16 NOTE — TELEPHONE ENCOUNTER
Reason for Call:  Form, our goal is to have forms completed with 72 hours, however, some forms may require a visit or additional information.    Type of letter, form or note:  medical    Who is the form from?: [facility] (if other please explain)    Where did the form come from: form was faxed in    What clinic location was the form placed at?: [facility] - [phone]    Where the form was placed: Box B Box/Folder    What number is listed as a contact on the form?: [phone]       Additional comments: Please return fax to [phone]    Call taken on 10/16/2019 at 3:24 PM by Jennifer Ledezma

## 2019-10-17 ENCOUNTER — MEDICAL CORRESPONDENCE (OUTPATIENT)
Dept: HEALTH INFORMATION MANAGEMENT | Facility: CLINIC | Age: 79
End: 2019-10-17

## 2019-10-17 NOTE — TELEPHONE ENCOUNTER
Form placed in out of office bin for review/signature in TC absence, if appropriate.  Sanjuanita Fiore, CMA

## 2019-10-18 ENCOUNTER — TELEPHONE (OUTPATIENT)
Dept: FAMILY MEDICINE | Facility: OTHER | Age: 79
End: 2019-10-18

## 2019-10-18 DIAGNOSIS — G47.00 INSOMNIA, UNSPECIFIED TYPE: ICD-10-CM

## 2019-10-18 RX ORDER — ESZOPICLONE 2 MG/1
2 TABLET, FILM COATED ORAL AT BEDTIME
Qty: 90 TABLET | Refills: 1 | Status: CANCELLED | OUTPATIENT
Start: 2019-10-18

## 2019-10-18 NOTE — TELEPHONE ENCOUNTER
Prior Authorization Retail Medication Request    Medication/Dose: Eszopiclone 2mg Tabs  ICD code (if different than what is on RX):    Previously Tried and Failed:    Rationale:      Insurance Name:  [facility] [place]  Insurance ID:  [id number]      Pharmacy Information (if different than what is on RX)  Name:  [facility]  Phone:  [phone]      Thank You,   Calista Mohr [facility]

## 2019-10-18 NOTE — TELEPHONE ENCOUNTER
PA Initiation    Medication: Eszopiclone 2mg Tabs - INITITATED  Insurance Company: [employer] [place] - Phone [phone] Fax [phone]  Pharmacy Filling the Rx: [facility]  Filling Pharmacy Phone: [phone]  Filling Pharmacy Fax:    Start Date: 10/18/2019

## 2019-10-21 NOTE — TELEPHONE ENCOUNTER
PRIOR AUTHORIZATION DENIED    Medication: Eszopiclone 2mg Tabs - DENIED    Denial Date: 10/18/2019    Denial Rational: PATIENT NEEDS TO TRY/FAIL 2 PREFERRED ALTERNATIVES      Appeal Information:

## 2019-10-22 ENCOUNTER — MEDICAL CORRESPONDENCE (OUTPATIENT)
Dept: HEALTH INFORMATION MANAGEMENT | Facility: CLINIC | Age: 79
End: 2019-10-22

## 2019-10-22 ENCOUNTER — TELEPHONE (OUTPATIENT)
Dept: FAMILY MEDICINE | Facility: OTHER | Age: 79
End: 2019-10-22

## 2019-10-22 DIAGNOSIS — G89.29 OTHER CHRONIC PAIN: ICD-10-CM

## 2019-10-22 RX ORDER — OXYCODONE HYDROCHLORIDE 5 MG/1
5 TABLET ORAL EVERY 4 HOURS PRN
Qty: 180 TABLET | Refills: 0 | Status: SHIPPED | OUTPATIENT
Start: 2019-10-22 | End: 2019-11-22

## 2019-10-22 NOTE — TELEPHONE ENCOUNTER
Oxycodone  Routing refill request to provider for review/approval because:  Drug not on the [facility] refill protocol     Last Written Prescription Date:  9/17/19  Last Fill Quantity: 180,  # refills: 0   Last office visit: 10/8/2019 with prescribing provider:     Future Office Visit:   Next 5 appointments (look out 90 days)    Nov 22, 2019  9:20 AM CST  [facility] [address] [place] [id number]  [phone]         Smiley Shannon, KWAME, BSN

## 2019-10-22 NOTE — TELEPHONE ENCOUNTER
Reason for Call:  Medication or medication refill:    Do you use a [facility]?  Name of the pharmacy and phone number for the current request:  Please call patient    Name of the medication requested: oxycodone    Other request:     Can we leave a detailed message on this number? NO    Phone number patient can be reached at: [phone]    Best Time:     Call taken on 10/22/2019 at 9:51 AM by Gina Last

## 2019-10-22 NOTE — TELEPHONE ENCOUNTER
Reason for Call:  Form, our goal is to have forms completed with 72 hours, however, some forms may require a visit or additional information.    Type of letter, form or note:  legal    Who is the form from?: Home care    Where did the form come from: form was faxed in    What clinic location was the form placed at?: [facility] - [phone]    Where the form was placed: TEAM B Box/Folder    What number is listed as a contact on the form?: [phone]       Additional comments: please sign and fax back to:  [phone]    Call taken on 10/22/2019 at 12:09 PM by Aurelio Ramos

## 2019-10-23 ENCOUNTER — TELEPHONE (OUTPATIENT)
Dept: FAMILY MEDICINE | Facility: OTHER | Age: 79
End: 2019-10-23

## 2019-10-23 NOTE — TELEPHONE ENCOUNTER
Notified patient that we received the notification as well and Dr Matthews will be addressing it on Friday when in the office.

## 2019-10-23 NOTE — TELEPHONE ENCOUNTER
Reason for Call:  Medication or medication refill:    Do you use a [facility]?  Name of the pharmacy and phone number for the current request:  [facility]    Name of the medication requested: Filenor (?), Tamazepram (?)-patient states she got a letter from [facility] saying that they won't cover the sleep aide she was prescribed without her trying others first    Other request: Please call and advise. Patient also had several other questions    Can we leave a detailed message on this number? YES    Phone number patient can be reached at: Home number on file [phone] (home)    Best Time: Any    Call taken on 10/23/2019 at 2:08 PM by Alana Stover

## 2019-11-08 NOTE — PROGRESS NOTES
Subjective     Katherin Jonas is a 79 year old female who presents to clinic today for the following health issues:    HPI   Chronic Pain Follow-Up       Type / Location of Pain: primarily back but throughout the body as well  Analgesia/pain control:       Recent changes:  Worse - Decreased Gabapentin at last visit       Overall control: Inadequate pain control  Activity level/function:      Daily activities:  Unable to perform most daily activities - chores, hobbies, social activities, driving    Work:  not applicable  Adverse effects:  No  Adherance    Taking medication as directed?  Yes    Participating in other treatments: no   Risk Factors:    Sleep:  Poor    Mood/anxiety:  controlled    Recent family or social stressors:  none noted    Other aggravating factors: bending over  PHQ-9 SCORE 12/7/2018 7/19/2019 8/20/2019   PHQ-9 Total Score - - -   PHQ-9 Total Score MyChart - - -   PHQ-9 Total Score 2 10 6     RACHID-7 SCORE 7/3/2018 12/7/2018 8/20/2019   Total Score - - -   Total Score 5 5 4     Encounter-Level CSA - 05/18/2016:    Controlled Substance Agreement - Scan on 5/31/2016  2:21 PM: CONTROLLED SUBSTANCE AGREEMENT     Patient-Level CSA:    There are no patient-level csa.        Met with a new cardiologist recently who discontinued her Amiodarone.  She is hoping that by stopping this medication will help out with a lot of her symptoms.  She still feels a little off balance.  She feels her shakiness is improved by stopping the amiodarone.    She has noticed since decreasing the gabapentin that her chronic pain has worsened.  She did not realize how much the gabapentin was helping out with her pain.  She does not feel there has been any improvement in her balance by decreasing the dose.  She is hoping to increase her dose back to her prior dosing.    Insurance will no longer cover Lunesta at all.  They have given her for alternatives.  She has tried trazodone in the past without improvement.  Would like to  avoid benzodiazepines as she is on chronic opioids.  Doxepin was on the list and she has not tried this before and she is willing to try this.    She has dry skin and she would like a prescription motion for this.    Reviewed and updated as needed this visit by Provider  Tobacco  Allergies  Meds  Problems  Med Hx  Surg Hx  Fam Hx         Review of Systems   CONSTITUTIONAL: NEGATIVE for fever, chills  RESP: NEGATIVE for significant cough or shortness of breath  CV: NEGATIVE for chest pain, palpitations or peripheral edema      Objective    /72 (BP Location: Right arm, Patient Position: Chair, Cuff Size: Adult Regular)   Pulse 69   Temp 97.6  F (36.4  C) (Temporal)   Resp 14   Wt 43.1 kg (95 lb)   LMP 02/01/2011   SpO2 99%   BMI 20.38 kg/m    Body mass index is 20.38 kg/m .  Physical Exam   Gen: no apparent distress  MSK: Significant kyphoscoliosis, unchanged  NECK: no adenopathy, no asymmetry, no masses  Chest: clear to auscultation without wheeze, rale or rhonchi  Cor: regular rate and rhythm without murmur  ABDOMEN: soft, nontender, no masses and bowel sounds normal  Ext: warm and dry without edema  Psych: Alert and oriented times 3; coherent speech, normal   rate and volume, able to articulate logical thoughts, able   to abstract reason, no tangential thoughts, no hallucinations   or delusions  Her affect is neutral        Assessment & Plan     1. Hypothyroidism, unspecified type  Due for labs.  So far increasing the dose is not improved her energy level.    - TSH with free T4 reflex    2. Postherpetic neuralgia  We will increase her gabapentin from 300 mg 3 times a day to 300 mg 4 times a day.    - gabapentin (NEURONTIN) 300 MG capsule; Take 1 capsule (300 mg) by mouth 4 times daily  Dispense: 360 capsule; Refill: 3    3. Dry skin  She has been on Lac-Hydrin in the past and would like to try this again.    - ammonium lactate (LAC-HYDRIN) 12 % external lotion; Apply topically 2 times daily   Dispense: 500 g; Refill: 0    4. Insomnia, unspecified type  She will finish out her Lunesta and then will switch to doxepin.    - doxepin (SILENOR) 3 MG tablet; Take 1 tablet (3 mg) by mouth nightly as needed for sleep  Dispense: 30 tablet; Refill: 11    5. Heart failure with preserved left ventricular function (HFpEF) (H)  Euvolemic.  We will check comprehensive metabolic panel.    - Comprehensive metabolic panel    6. Thrombocytopenia (H)  Recheck.    - CBC with platelets    7. Leukopenia, unspecified type  Recheck labs.    - CBC with platelets    8. Recurrent falls  Encouraged to continue with the exercises physical therapy has given her.    9. Other chronic pain  Stable.  We will continue her oxycodone without change but will increase her gabapentin.    - oxyCODONE (ROXICODONE) 5 MG tablet; Take 1 tablet (5 mg) by mouth every 4 hours as needed for pain  Dispense: 180 tablet; Refill: 0    10. Mild major depression (H)  Stable.  Refills are given.    - FLUoxetine (PROZAC) 40 MG capsule; Take 1 capsule (40 mg) by mouth daily  Dispense: 90 capsule; Refill: 3     Return in about 2 months (around 1/22/2020) for pain follow up.    Silvia Matthews MD  [facility]

## 2019-11-13 ENCOUNTER — TELEPHONE (OUTPATIENT)
Dept: FAMILY MEDICINE | Facility: OTHER | Age: 79
End: 2019-11-13

## 2019-11-13 NOTE — TELEPHONE ENCOUNTER
PT discharge summary in media tab on date 11/1/19. Form was faxed but not signed. Please sign and fax to number listed.    Fax to: [phone]

## 2019-11-18 NOTE — TELEPHONE ENCOUNTER
Lia from [facility] called clinic stating that they have  not received the Physical Therapy discharge order with Dr. Matthews's signature. It was faxed on 10/16 and again on the date of this encounter, 10/22. It  Is urgent and insurance needed this within 30 days. If we still have copy of PT order, please have Dr. Matthews sign and fax back to number listed on form. If we do not have copy any more, please call Lia at: [phone].I told Lia that graciela JOHNSTON is not in clinic until tomorrow.

## 2019-11-19 NOTE — TELEPHONE ENCOUNTER
Looked in all places form should be and cannot find the form, unfortunately I will have to wait until tomorrow to as MA if she knows where this may have been placed.  Kathleen Ramos MA

## 2019-11-19 NOTE — TELEPHONE ENCOUNTER
Lia from [facility] called again stating that they have not received this fax, she is certain there was no issue with their fax and she wants to know what is going on, and why they have no received this yet. Please re-fax to: [phone], or call Lia back.

## 2019-11-22 ENCOUNTER — MEDICAL CORRESPONDENCE (OUTPATIENT)
Dept: HEALTH INFORMATION MANAGEMENT | Facility: CLINIC | Age: 79
End: 2019-11-22

## 2019-11-22 ENCOUNTER — TELEPHONE (OUTPATIENT)
Dept: FAMILY MEDICINE | Facility: OTHER | Age: 79
End: 2019-11-22

## 2019-11-22 ENCOUNTER — OFFICE VISIT (OUTPATIENT)
Dept: FAMILY MEDICINE | Facility: OTHER | Age: 79
End: 2019-11-22
Payer: COMMERCIAL

## 2019-11-22 VITALS
TEMPERATURE: 97.6 F | WEIGHT: 95 LBS | SYSTOLIC BLOOD PRESSURE: 138 MMHG | DIASTOLIC BLOOD PRESSURE: 72 MMHG | OXYGEN SATURATION: 99 % | BODY MASS INDEX: 20.38 KG/M2 | RESPIRATION RATE: 14 BRPM | HEART RATE: 69 BPM

## 2019-11-22 DIAGNOSIS — D72.819 LEUKOPENIA, UNSPECIFIED TYPE: ICD-10-CM

## 2019-11-22 DIAGNOSIS — G47.00 INSOMNIA, UNSPECIFIED TYPE: ICD-10-CM

## 2019-11-22 DIAGNOSIS — E03.9 HYPOTHYROIDISM, UNSPECIFIED TYPE: ICD-10-CM

## 2019-11-22 DIAGNOSIS — I50.30 HEART FAILURE WITH PRESERVED LEFT VENTRICULAR FUNCTION (HFPEF) (H): ICD-10-CM

## 2019-11-22 DIAGNOSIS — L85.3 DRY SKIN: ICD-10-CM

## 2019-11-22 DIAGNOSIS — D69.6 THROMBOCYTOPENIA (H): ICD-10-CM

## 2019-11-22 DIAGNOSIS — F32.0 MILD MAJOR DEPRESSION (H): ICD-10-CM

## 2019-11-22 DIAGNOSIS — B02.29 POSTHERPETIC NEURALGIA: ICD-10-CM

## 2019-11-22 DIAGNOSIS — R29.6 RECURRENT FALLS: ICD-10-CM

## 2019-11-22 DIAGNOSIS — E03.9 HYPOTHYROIDISM, UNSPECIFIED TYPE: Primary | ICD-10-CM

## 2019-11-22 DIAGNOSIS — G89.29 OTHER CHRONIC PAIN: ICD-10-CM

## 2019-11-22 LAB
ALBUMIN SERPL-MCNC: 3.2 G/DL (ref 3.4–5)
ALP SERPL-CCNC: 55 U/L (ref 40–150)
ALT SERPL W P-5'-P-CCNC: 49 U/L (ref 0–50)
ANION GAP SERPL CALCULATED.3IONS-SCNC: 4 MMOL/L (ref 3–14)
AST SERPL W P-5'-P-CCNC: 39 U/L (ref 0–45)
BILIRUB SERPL-MCNC: 0.5 MG/DL (ref 0.2–1.3)
BUN SERPL-MCNC: 20 MG/DL (ref 7–30)
CALCIUM SERPL-MCNC: 7.9 MG/DL (ref 8.5–10.1)
CHLORIDE SERPL-SCNC: 105 MMOL/L (ref 94–109)
CO2 SERPL-SCNC: 27 MMOL/L (ref 20–32)
CREAT SERPL-MCNC: 0.72 MG/DL (ref 0.52–1.04)
ERYTHROCYTE [DISTWIDTH] IN BLOOD BY AUTOMATED COUNT: 15.3 % (ref 10–15)
GFR SERPL CREATININE-BSD FRML MDRD: 80 ML/MIN/{1.73_M2}
GLUCOSE SERPL-MCNC: 106 MG/DL (ref 70–99)
HCT VFR BLD AUTO: 39.9 % (ref 35–47)
HGB BLD-MCNC: 13.3 G/DL (ref 11.7–15.7)
MCH RBC QN AUTO: 30.3 PG (ref 26.5–33)
MCHC RBC AUTO-ENTMCNC: 33.3 G/DL (ref 31.5–36.5)
MCV RBC AUTO: 91 FL (ref 78–100)
PLATELET # BLD AUTO: 143 10E9/L (ref 150–450)
POTASSIUM SERPL-SCNC: 4.3 MMOL/L (ref 3.4–5.3)
PROT SERPL-MCNC: 7.9 G/DL (ref 6.8–8.8)
RBC # BLD AUTO: 4.39 10E12/L (ref 3.8–5.2)
SODIUM SERPL-SCNC: 136 MMOL/L (ref 133–144)
TSH SERPL DL<=0.005 MIU/L-ACNC: 3.2 MU/L (ref 0.4–4)
WBC # BLD AUTO: 3.1 10E9/L (ref 4–11)

## 2019-11-22 PROCEDURE — 85027 COMPLETE CBC AUTOMATED: CPT | Performed by: FAMILY MEDICINE

## 2019-11-22 PROCEDURE — 84443 ASSAY THYROID STIM HORMONE: CPT | Performed by: FAMILY MEDICINE

## 2019-11-22 PROCEDURE — 36415 COLL VENOUS BLD VENIPUNCTURE: CPT | Performed by: FAMILY MEDICINE

## 2019-11-22 PROCEDURE — 99214 OFFICE O/P EST MOD 30 MIN: CPT | Performed by: FAMILY MEDICINE

## 2019-11-22 PROCEDURE — 80053 COMPREHEN METABOLIC PANEL: CPT | Performed by: FAMILY MEDICINE

## 2019-11-22 RX ORDER — LEVOTHYROXINE SODIUM 50 UG/1
50 TABLET ORAL DAILY
Qty: 90 TABLET | Refills: 3 | Status: SHIPPED | OUTPATIENT
Start: 2019-11-22 | End: 2020-12-08

## 2019-11-22 RX ORDER — GABAPENTIN 300 MG/1
300 CAPSULE ORAL 4 TIMES DAILY
Qty: 360 CAPSULE | Refills: 3 | Status: SHIPPED | OUTPATIENT
Start: 2019-11-22 | End: 2021-01-05

## 2019-11-22 RX ORDER — FLUOXETINE 40 MG/1
40 CAPSULE ORAL DAILY
Qty: 90 CAPSULE | Refills: 3 | Status: SHIPPED | OUTPATIENT
Start: 2019-11-22 | End: 2021-01-05

## 2019-11-22 RX ORDER — DOXEPIN 3 MG/1
3 TABLET, FILM COATED ORAL
Qty: 30 TABLET | Refills: 11 | Status: SHIPPED | OUTPATIENT
Start: 2019-11-22 | End: 2020-01-24

## 2019-11-22 RX ORDER — AMMONIUM LACTATE 12 G/100G
LOTION TOPICAL 2 TIMES DAILY
Qty: 500 G | Refills: 0 | Status: SHIPPED | OUTPATIENT
Start: 2019-11-22 | End: 2020-01-24

## 2019-11-22 RX ORDER — OXYCODONE HYDROCHLORIDE 5 MG/1
5 TABLET ORAL EVERY 4 HOURS PRN
Qty: 180 TABLET | Refills: 0 | Status: SHIPPED | OUTPATIENT
Start: 2019-11-22 | End: 2020-01-17

## 2019-11-22 NOTE — TELEPHONE ENCOUNTER
This was faxed back but Dr Cantu never signed it. Can you please print it of and have her sign it and fax it back .   Thank you

## 2019-11-22 NOTE — TELEPHONE ENCOUNTER
Spouse was asking about if patient should still be on her thyroid medication and check'd with Dr. Richardson and Yes. I will pend order and pharmacy  Once ordered can close encounter. Spouse is aware

## 2019-11-22 NOTE — TELEPHONE ENCOUNTER
Left message for patient to return call  Kirsten Jaffe CMA     ----- Message from Silvia Matthews MD sent at 11/22/2019  3:55 PM CST -----  Thyroid levels back to normal.    Liver back to normal.    White count slightly low, but stable, will recheck at her next appointment.    Electronically signed by Silvia Matthews MD on 11/22/2019

## 2020-01-14 NOTE — PROGRESS NOTES
Subjective     Katherin Jonas is a 79 year old female who presents to clinic today for the following health issues:    HPI   Chronic Pain Follow-Up       Type / Location of Pain: Primarily in back but throughout the body as well  Analgesia/pain control:       Recent changes:  same      Overall control: Inadequate pain control  Activity level/function:      Daily activities:  Unable to perform most daily activities - chores, hobbies, social activities, driving    Work:  not applicable  Adverse effects:  No  Adherance    Taking medication as directed?  Yes    Participating in other treatments: no   Risk Factors:    Sleep:  Poor    Mood/anxiety:  slightly worsened    Recent family or social stressors:  none noted    Other aggravating factors: repetitive activities - .     She has been trying to decrease her oxycodone.  She had been prescribed to take up to 6 a day.  She has been decreasing it down to 4 a day and her last prescription lasted about 6 weeks.  However she now complains of some pain in her left leg down the back of her leg which she feels is sciatica.  She denies numbness or tingling.    She does not like taking her thyroid medication as she does not like not being able to eat as soon as she wakes up.  Her last thyroid test was within normal limits.  She still feels fatigued.  She still feels a little constipated.  She is not taking her senna on a regular fashion.  Cardiology has been working with changing her medications around to see if this helps with some of her fatigue.  Her metoprolol and amlodipine have been stopped and she was recently started on diltiazem.  She will be following up with cardiology to discuss these med changes.    She tells me that the doxepin caused weird dreams so she stopped it after trying it only twice.  She does not feel the Lunesta at 2 mg works and feel she needed the 3 mg.  However her insurance also will not cover Lunesta at all.    PHQ-9 SCORE 7/19/2019 8/20/2019  2020   PHQ-9 Total Score - - -   PHQ-9 Total Score MyChart - - -   PHQ-9 Total Score 10 6 10     RACHID-7 SCORE 7/3/2018 2018 2019   Total Score - - -   Total Score 5 5 4     Encounter-Level CSA - 2016:    Controlled Substance Agreement - Scan on 2016  2:21 PM: CONTROLLED SUBSTANCE AGREEMENT     Patient-Level CSA:    There are no patient-level csa.         Patient Active Problem List   Diagnosis     Esophageal reflux     Carotid atherosclerosis     CKD (chronic kidney disease) stage 3, GFR 30-59 ml/min (H)     Insomnia     Mild major depression (H)     Advanced directives, counseling/discussion     Essential hypertension     Hyperlipidemia, unspecified     Scoliosis     Age-related osteoporosis without current pathological fracture     Postherpetic neuralgia     Other chronic pain     COPD (chronic obstructive pulmonary disease) (H)     Paroxysmal atrial fibrillation (H)     Anxiety     Anticoagulant long-term use     Heart failure with preserved left ventricular function (HFpEF) (H)     Hypothyroid     Thrombocytopenia (H)     Cardiac pacemaker in situ     Recurrent falls     Past Surgical History:   Procedure Laterality Date     C FULL ROUT OBSTE CARE, DELIV  1964     C FULL ROUT OBSTE CARE, DELIV  1969     C FULL ROUT OBSTE CARE, DELIV  1970     C TOTAL KNEE ARTHROPLASTY  1997    left     C TOTAL KNEE ARTHROPLASTY  2005    right     C TREAT ECTOPIC PREG,RMV TUBE/OVARY  1967    left     COLONOSCOPY  11    [facility] REPAIR OF NASAL SEPTUM         Social History     Tobacco Use     Smoking status: Former Smoker     Last attempt to quit: 1979     Years since quittin.0     Smokeless tobacco: Never Used     Tobacco comment: no smokers in household   Substance Use Topics     Alcohol use: No     Comment: none since      Family History   Problem Relation Age of Onset     Substance Abuse Father      Cancer Daughter       Depression Daughter      Hypertension Mother            Reviewed and updated as needed this visit by Provider  Tobacco  Allergies  Meds  Problems  Med Hx  Surg Hx  Fam Hx         Review of Systems   CONSTITUTIONAL: NEGATIVE for fever, chills, change in weight  RESP: NEGATIVE for significant cough or shortness of breath   CV: NEGATIVE for chest pain, palpitations or peripheral edema      Objective    BP (!) 142/76   Pulse 89   Temp 97.9  F (36.6  C) (Temporal)   Resp 14   Wt 44.9 kg (99 lb)   LMP 02/01/2011   SpO2 98%   BMI 21.24 kg/m    Body mass index is 21.24 kg/m .  Physical Exam   Gen: no apparent distress  Back: Significant kyphoscoliosis is unchanged  Chest: clear to auscultation without wheeze, rale or rhonchi  Cor: regular rate and rhythm without murmur  Ext: warm and dry without edema.  Negative straight leg raises bilaterally.  Strength in the lower extremities grossly intact bilaterally.  Sensation grossly intact bilaterally.  Psych: Alert and oriented times 3; coherent speech, normal   rate and volume, able to articulate logical thoughts, able   to abstract reason, no tangential thoughts, no hallucinations   or delusions  Her affect is flat    PHQ-9 SCORE 1/24/2020   PHQ-9 Total Score -   PHQ-9 Total Score MyChart -   PHQ-9 Total Score 10           Assessment & Plan     1. Sciatica of left side  Offered physical therapy but she declines.  We discussed some stretching that she could do at home.  She will let me know if she would like to have physical therapy.    2. Other chronic pain  She plans to continue to use oxycodone at 4-5 a day.  She just received a prescription last week.  She will let me know when she is due for another refill.  She will follow-up in 3 months.    3. Insomnia, unspecified type  She had tried trazodone in the past did not feel it worked but this was many years ago.  Discussed that I would not order Lunesta 3 mg for her based on her age and other comorbidities.  She  would like to try trazodone again.  If the 50 mg does not work for her at night she may increase to 100 mg.    - traZODone (DESYREL) 50 MG tablet; Take 1 tablet (50 mg) by mouth At Bedtime  Dispense: 90 tablet; Refill: 3    4. Paroxysmal atrial fibrillation (H)  She is following closely with cardiology and remains on diltiazem and Xarelto.    5. Dry skin  She would like a refill on her lotion as she feels it is helpful    - ammonium lactate (LAC-HYDRIN) 12 % external lotion; Apply topically 2 times daily  Dispense: 500 g; Refill: 11    6. Hypothyroidism, unspecified type  Recommended that she remain on her levothyroxine.  Discussed that I felt that without it she may become more fatigued and worsen her constipation.    7. Mild major depression (H)  PHQ 9 has worsened but she feels it is really related to her fatigue and her sleep.  We will continue her fluoxetine without change.  We will see if the trazodone helps with her sleep and if this improves her mood.    8. Heart failure with preserved left ventricular function (HFpEF) (H)  Euvolemic.  Follows with cardiology.    9. Chronic obstructive pulmonary disease, unspecified COPD type (H)  Breathing is stable.  She is not on any chronic inhalers.    10. Thrombocytopenia (H)  Stable.    11. CKD (chronic kidney disease) stage 3, GFR 30-59 ml/min (H)  Stable.      Return in about 3 months (around 4/24/2020) for pain follow up.    Silvia Matthews MD  [facility]

## 2020-01-16 DIAGNOSIS — G89.29 OTHER CHRONIC PAIN: ICD-10-CM

## 2020-01-17 RX ORDER — OXYCODONE HYDROCHLORIDE 5 MG/1
TABLET ORAL
Qty: 180 TABLET | Refills: 0 | Status: SHIPPED | OUTPATIENT
Start: 2020-01-17 | End: 2020-03-10

## 2020-01-17 NOTE — TELEPHONE ENCOUNTER
Pending Prescriptions:                       Disp   Refills    oxyCODONE (ROXICODONE) 5 MG tablet [Pharma*180 ta*0        Sig: TAKE ONE TABLET BY MOUTH EVERY 4 HOURS AS NEEDED FOR           PAIN    Last Written Prescription Date:  11/22/2019  Last Fill Quantity: 180,  # refills: 0   Last office visit: 11/22/2019 with prescribing provider:     Future Office Visit:   Next 5 appointments (look out 90 days)    Jan 24, 2020  9:00 AM CST  Office Visit with Silvia Matthews MD  [facility] ([facility]) [address] [place] [id number]  [phone]           Routing refill request to provider for review/approval because:  Drug not on the [facility] refill protocol     Jackie Harrison, MSN, RN

## 2020-01-24 ENCOUNTER — OFFICE VISIT (OUTPATIENT)
Dept: FAMILY MEDICINE | Facility: OTHER | Age: 80
End: 2020-01-24
Payer: COMMERCIAL

## 2020-01-24 VITALS
SYSTOLIC BLOOD PRESSURE: 142 MMHG | TEMPERATURE: 97.9 F | DIASTOLIC BLOOD PRESSURE: 76 MMHG | WEIGHT: 99 LBS | RESPIRATION RATE: 14 BRPM | HEART RATE: 89 BPM | OXYGEN SATURATION: 98 % | BODY MASS INDEX: 21.24 KG/M2

## 2020-01-24 DIAGNOSIS — L85.3 DRY SKIN: ICD-10-CM

## 2020-01-24 DIAGNOSIS — I48.0 PAROXYSMAL ATRIAL FIBRILLATION (H): ICD-10-CM

## 2020-01-24 DIAGNOSIS — G47.00 INSOMNIA, UNSPECIFIED TYPE: ICD-10-CM

## 2020-01-24 DIAGNOSIS — G89.29 OTHER CHRONIC PAIN: ICD-10-CM

## 2020-01-24 DIAGNOSIS — F32.0 MILD MAJOR DEPRESSION (H): ICD-10-CM

## 2020-01-24 DIAGNOSIS — M54.32 SCIATICA OF LEFT SIDE: Primary | ICD-10-CM

## 2020-01-24 DIAGNOSIS — D69.6 THROMBOCYTOPENIA (H): ICD-10-CM

## 2020-01-24 DIAGNOSIS — I50.30 HEART FAILURE WITH PRESERVED LEFT VENTRICULAR FUNCTION (HFPEF) (H): ICD-10-CM

## 2020-01-24 DIAGNOSIS — E03.9 HYPOTHYROIDISM, UNSPECIFIED TYPE: ICD-10-CM

## 2020-01-24 DIAGNOSIS — N18.30 CKD (CHRONIC KIDNEY DISEASE) STAGE 3, GFR 30-59 ML/MIN (H): ICD-10-CM

## 2020-01-24 DIAGNOSIS — J44.9 CHRONIC OBSTRUCTIVE PULMONARY DISEASE, UNSPECIFIED COPD TYPE (H): ICD-10-CM

## 2020-01-24 PROCEDURE — 99214 OFFICE O/P EST MOD 30 MIN: CPT | Performed by: FAMILY MEDICINE

## 2020-01-24 RX ORDER — AMMONIUM LACTATE 12 G/100G
LOTION TOPICAL 2 TIMES DAILY
Qty: 500 G | Refills: 11 | Status: SHIPPED | OUTPATIENT
Start: 2020-01-24 | End: 2021-01-01

## 2020-01-24 RX ORDER — TRAZODONE HYDROCHLORIDE 50 MG/1
50 TABLET, FILM COATED ORAL AT BEDTIME
Qty: 90 TABLET | Refills: 3 | Status: SHIPPED | OUTPATIENT
Start: 2020-01-24 | End: 2020-02-11 | Stop reason: SINTOL

## 2020-01-24 RX ORDER — DILTIAZEM HYDROCHLORIDE 120 MG/1
CAPSULE, EXTENDED RELEASE ORAL
COMMUNITY
End: 2020-04-24

## 2020-01-24 ASSESSMENT — PATIENT HEALTH QUESTIONNAIRE - PHQ9: SUM OF ALL RESPONSES TO PHQ QUESTIONS 1-9: 10

## 2020-01-28 ENCOUNTER — TRANSFERRED RECORDS (OUTPATIENT)
Dept: HEALTH INFORMATION MANAGEMENT | Facility: CLINIC | Age: 80
End: 2020-01-28

## 2020-02-10 ENCOUNTER — TELEPHONE (OUTPATIENT)
Dept: FAMILY MEDICINE | Facility: OTHER | Age: 80
End: 2020-02-10

## 2020-02-10 DIAGNOSIS — G47.00 INSOMNIA, UNSPECIFIED TYPE: Primary | ICD-10-CM

## 2020-02-10 NOTE — TELEPHONE ENCOUNTER
Vesta    Pt called with complaint of being congested (sounds very stuffed up).     She was wondering if it were from Trazodone- it does not appear that is a side effect.      She also complained that she has been gaining weight.  (about 4 lbs)     Peripheral edema is listed though she does not state that her feet are swollen.      Please advise. It sounds like she has a cold.    She would also like to go back on her lunesta. She would need a new rx if so.    John Paul Jovel Pharm. D.  [facility] Pharmacy Manager  [phone]  2/10/2020

## 2020-02-11 RX ORDER — ESZOPICLONE 1 MG/1
1 TABLET, FILM COATED ORAL AT BEDTIME
Qty: 30 TABLET | Refills: 1 | Status: SHIPPED | OUTPATIENT
Start: 2020-02-11 | End: 2020-04-24

## 2020-02-11 NOTE — TELEPHONE ENCOUNTER
The message sounded like she had multiple complaints and also that she wanted Lunesta.  It sounds like it is actually that she feels the trazodone is giving side effects and she wants Lunesta instead.  In that case, will stop the trazodone and send over Lunesta.  She has been off of this a couple months, therefore, will start her at 1 mg at [facility].

## 2020-02-11 NOTE — TELEPHONE ENCOUNTER
Patient received message and stated she does not want to come back in for an appointment. I explained that was Dr. Matthews's advise on this. Patient asked to send again that the new medication is making her congested and she just wants go back on the lunesta and pay for it out of pocket.     Soham Gomez,

## 2020-03-10 DIAGNOSIS — G89.29 OTHER CHRONIC PAIN: ICD-10-CM

## 2020-03-10 RX ORDER — OXYCODONE HYDROCHLORIDE 5 MG/1
5 TABLET ORAL EVERY 6 HOURS PRN
Qty: 120 TABLET | Refills: 0 | Status: SHIPPED | OUTPATIENT
Start: 2020-03-10 | End: 2020-04-10

## 2020-03-10 NOTE — TELEPHONE ENCOUNTER
Chart reviewed, patient had been decreasing her oxycodone from 6 a day down to 4-5 a day.  This last prescription has lasted almost 2 months.  Will decrease the dose to every 6 hours as needed.  She already has follow-up scheduled for next month.

## 2020-04-10 ENCOUNTER — INFUSION THERAPY VISIT (OUTPATIENT)
Dept: INFUSION THERAPY | Facility: CLINIC | Age: 80
End: 2020-04-10
Payer: COMMERCIAL

## 2020-04-10 VITALS
TEMPERATURE: 98 F | OXYGEN SATURATION: 97 % | DIASTOLIC BLOOD PRESSURE: 78 MMHG | RESPIRATION RATE: 18 BRPM | BODY MASS INDEX: 21.97 KG/M2 | WEIGHT: 102.4 LBS | SYSTOLIC BLOOD PRESSURE: 161 MMHG | HEART RATE: 87 BPM

## 2020-04-10 DIAGNOSIS — G89.29 OTHER CHRONIC PAIN: ICD-10-CM

## 2020-04-10 DIAGNOSIS — N18.30 CKD (CHRONIC KIDNEY DISEASE) STAGE 3, GFR 30-59 ML/MIN (H): ICD-10-CM

## 2020-04-10 DIAGNOSIS — M81.0 AGE-RELATED OSTEOPOROSIS WITHOUT CURRENT PATHOLOGICAL FRACTURE: Primary | ICD-10-CM

## 2020-04-10 PROCEDURE — 99207 ZZC NO CHARGE NURSE ONLY: CPT

## 2020-04-10 PROCEDURE — 96372 THER/PROPH/DIAG INJ SC/IM: CPT | Performed by: PHYSICIAN ASSISTANT

## 2020-04-10 RX ORDER — OXYCODONE HYDROCHLORIDE 5 MG/1
TABLET ORAL
Qty: 120 TABLET | Refills: 0 | Status: SHIPPED | OUTPATIENT
Start: 2020-04-10 | End: 2020-05-15

## 2020-04-10 NOTE — TELEPHONE ENCOUNTER
TC patient. Due for routine fill. Has follow up set up for later this month.    Rx e-prescribed.    Koby Scott-SKYLAR Hampton  [facility]

## 2020-04-10 NOTE — TELEPHONE ENCOUNTER
Routing refill request to provider for review/approval because:  Drug not on the [facility] refill protocol     Last Written Prescription Date:  3/10/20  Last Fill Quantity: 120,  # refills: 0   Last office visit: 1/24/2020 with prescribing provider:     Future Office Visit:   Next 5 appointments (look out 90 days)    Apr 24, 2020  9:00 AM CDT  Telephone Visit with Silvia Matthews MD  [facility] ([facility]) [address] [place] [id number]  [phone]         Smiley Shannon, RN, BSN

## 2020-04-10 NOTE — PROGRESS NOTES
Infusion Nursing Note:  Katherin Jonas presents today for Prolia injection.    Patient seen by provider today: No   present during visit today: Not Applicable.    Note: Assessment performed by Deedee GOLDBERG RN prior to injection today. Patient continues to take calcium and vitamin D daily. Denies jaw pain nor does she have any upcoming dental procedures scheduled.    Intravenous Access:  No Intravenous access/labs at this visit.    Treatment Conditions:  Lab Results   Component Value Date     11/22/2019                   Lab Results   Component Value Date    POTASSIUM 4.3 11/22/2019           Lab Results   Component Value Date    MAG 2.1 07/29/2013            Lab Results   Component Value Date    CR 0.72 11/22/2019                   Lab Results   Component Value Date    LEIDA 7.9 11/22/2019                Lab Results   Component Value Date    BILITOTAL 0.5 11/22/2019           Lab Results   Component Value Date    ALBUMIN 3.2 11/22/2019                    Lab Results   Component Value Date    ALT 49 11/22/2019           Lab Results   Component Value Date    AST 39 11/22/2019       Results reviewed, labs MET treatment parameters, ok to proceed with treatment.      Post Infusion Assessment:  Patient tolerated injection without incident.  Site patent and intact, free from redness, edema or discomfort.       Discharge Plan:   Patient discharged in stable condition accompanied by: self.  Departure Mode: Ambulatory.    Lyssa Soares LPN

## 2020-04-21 NOTE — PROGRESS NOTES
"Katherin Jonas is a 79 year old female who is being evaluated via a billable telephone visit.      The patient has been notified of following:     \"This telephone visit will be conducted via a call between you and your physician/provider. We have found that certain health care needs can be provided without the need for a physical exam.  This service lets us provide the care you need with a short phone conversation.  If a prescription is necessary we can send it directly to your pharmacy.  If lab work is needed we can place an order for that and you can then stop by our lab to have the test done at a later time.    Telephone visits are billed at different rates depending on your insurance coverage. During this emergency period, for some insurers they may be billed the same as an in-person visit.  Please reach out to your insurance provider with any questions.    If during the course of the call the physician/provider feels a telephone visit is not appropriate, you will not be charged for this service.\"    Patient has given verbal consent for Telephone visit?  Yes    How would you like to obtain your AVS? Mail a copy    Subjective     Katherin Jonas is a 79 year old female who presents to clinic today for the following health issues:    Chronic Pain Follow-Up    Where in your body do you have pain? Back, arm, feet, neck  How has your pain affected your ability to work? Not applicable  Which of these pain treatments have you tried since your last clinic visit? Other: None  How well are you sleeping? Good  How has your mood been since your last visit? About the same  Have you had a significant life event? No  Other aggravating factors: prolonged standing  Taking medication as directed? Yes    PHQ-9 SCORE 7/19/2019 8/20/2019 1/24/2020   PHQ-9 Total Score - - -   PHQ-9 Total Score MyChart - - -   PHQ-9 Total Score 10 6 10     RACHID-7 SCORE 7/3/2018 12/7/2018 8/20/2019   Total Score - - -   Total Score 5 5 4     No " flowsheet data found.  Encounter-Level CSA - 2016:    Controlled Substance Agreement - Scan on 2016  2:21 PM: CONTROLLED SUBSTANCE AGREEMENT     Patient-Level CSA:    There are no patient-level csa.         How many servings of fruits and vegetables do you eat daily?  2-3    On average, how many sweetened beverages do you drink each day (Examples: soda, juice, sweet tea, etc.  Do NOT count diet or artificially sweetened beverages)?   0    How many days per week do you exercise enough to make your heart beat faster? 3 or less    How many minutes a day do you exercise enough to make your heart beat faster? 9 or less    How many days per week do you miss taking your medication? 0    Patient feels her pain is stable.  However she has noticed increasing constipation.  She wonders if it is related to the change in her diltiazem that cardiology did, or the addition of the trazodone. She is only taking her senna 2 tablets once a day.  She denies any blood in her stools.  She denies abdominal pain.      Patient Active Problem List   Diagnosis     Esophageal reflux     Carotid atherosclerosis     CKD (chronic kidney disease) stage 3, GFR 30-59 ml/min (H)     Insomnia     Mild major depression (H)     Advanced directives, counseling/discussion     Essential hypertension     Hyperlipidemia, unspecified     Scoliosis     Age-related osteoporosis without current pathological fracture     Postherpetic neuralgia     Other chronic pain     COPD (chronic obstructive pulmonary disease) (H)     Paroxysmal atrial fibrillation (H)     Anxiety     Anticoagulant long-term use     Heart failure with preserved left ventricular function (HFpEF) (H)     Hypothyroid     Thrombocytopenia (H)     Cardiac pacemaker in situ     Recurrent falls     Past Surgical History:   Procedure Laterality Date     C FULL ROUT OBSTE CARE, DELIV       C FULL ROUT OBSTE CARE, DELIV       C FULL ROUT OBSTE CARE, DELIV   1970     C TOTAL KNEE ARTHROPLASTY  1997    left     C TOTAL KNEE ARTHROPLASTY  2005    right     C TREAT ECTOPIC PREG,RMV TUBE/OVARY  1967    left     COLONOSCOPY  11    [facility] REPAIR OF NASAL SEPTUM         Social History     Tobacco Use     Smoking status: Former Smoker     Last attempt to quit: 1979     Years since quittin.3     Smokeless tobacco: Never Used     Tobacco comment: no smokers in household   Substance Use Topics     Alcohol use: No     Comment: none since      Family History   Problem Relation Age of Onset     Substance Abuse Father      Cancer Daughter      Depression Daughter      Hypertension Mother            Reviewed and updated as needed this visit by Provider  Tobacco  Allergies  Meds  Problems  Med Hx  Surg Hx  Fam Hx         Review of Systems   CONSTITUTIONAL: NEGATIVE for fever, chills, change in weight  RESP: NEGATIVE for significant cough or shortness of breath   CV: NEGATIVE for chest pain, palpitations or peripheral edema       Objective   Reported vitals:  [facility] 2011    alert, no distress and cooperative  PSYCH: Alert and oriented times 3; coherent speech, normal   rate and volume, able to articulate logical thoughts, able   to abstract reason, no tangential thoughts, no hallucinations   or delusions  Her affect is normal  RESP: No cough, no audible wheezing, able to talk in full sentences  Remainder of exam unable to be completed due to telephone visits          Assessment/Plan:  1. Other chronic pain  Pain is controlled.  Continue oxycodone 4 tablets daily.  Follow-up in 3 months.    2. Drug-induced constipation  Encouraged her to increase her water intake.  We will also have her take senna 2 tabs twice a day.  If this is not helpful we will add MiraLAX.    3. Paroxysmal atrial fibrillation (H)  She continues to follow with cardiology.    4.  Insomnia  She states that the trazodone has been effective.  Therefore  she never took the Lunesta again.      Return in about 3 months (around 7/24/2020) for BP Recheck, Lab Work, pain follow up.      Phone call duration:  10 minutes    Silvia Matthews MD

## 2020-04-24 ENCOUNTER — VIRTUAL VISIT (OUTPATIENT)
Dept: FAMILY MEDICINE | Facility: OTHER | Age: 80
End: 2020-04-24
Payer: COMMERCIAL

## 2020-04-24 DIAGNOSIS — G89.29 OTHER CHRONIC PAIN: Primary | ICD-10-CM

## 2020-04-24 DIAGNOSIS — I48.0 PAROXYSMAL ATRIAL FIBRILLATION (H): ICD-10-CM

## 2020-04-24 DIAGNOSIS — K59.03 DRUG-INDUCED CONSTIPATION: ICD-10-CM

## 2020-04-24 PROCEDURE — 99214 OFFICE O/P EST MOD 30 MIN: CPT | Mod: 95 | Performed by: FAMILY MEDICINE

## 2020-04-24 RX ORDER — DILTIAZEM HYDROCHLORIDE 360 MG/1
360 CAPSULE, EXTENDED RELEASE ORAL
COMMUNITY
Start: 2020-04-15 | End: 2021-01-22

## 2020-04-24 RX ORDER — TRAZODONE HYDROCHLORIDE 50 MG/1
50 TABLET, FILM COATED ORAL AT BEDTIME
COMMUNITY
Start: 2020-04-23 | End: 2021-01-22 | Stop reason: ALTCHOICE

## 2020-05-15 DIAGNOSIS — G89.29 OTHER CHRONIC PAIN: ICD-10-CM

## 2020-05-15 RX ORDER — OXYCODONE HYDROCHLORIDE 5 MG/1
5 TABLET ORAL EVERY 6 HOURS PRN
Qty: 120 TABLET | Refills: 0 | Status: SHIPPED | OUTPATIENT
Start: 2020-05-15 | End: 2020-06-18

## 2020-05-15 NOTE — TELEPHONE ENCOUNTER
Pending Prescriptions:                       Disp   Refills    oxyCODONE (ROXICODONE) 5 MG tablet         120 ta*0          Last Written Prescription Date:  4/10/20  Last Fill Quantity: 120,  # refills: 0   Last office visit: 1/24/2020 with prescribing provider:     Future Office Visit:      Routing refill request to provider for review/approval because:  Drug not on the [facility] refill protocol     Jackie Harrison, MSN, RN

## 2020-06-16 DIAGNOSIS — G89.29 OTHER CHRONIC PAIN: ICD-10-CM

## 2020-06-17 NOTE — TELEPHONE ENCOUNTER
Pending Prescriptions:                       Disp   Refills    oxyCODONE (ROXICODONE) 5 MG tablet [Pharma*120 ta*0        Sig: TAKE ONE TABLET BY MOUTH EVERY 6 HOURS AS NEEDED FOR           SEVERE PAIN    Last Written Prescription Date:  5/15/20  Last Fill Quantity: 120,  # refills: 0   Last office visit: 1/24/2020 with prescribing provider:     Future Office Visit:            Routing refill request to provider for review/approval because:  Drug not on the [facility] refill protocol     Jackie Harrison, MSN, RN

## 2020-06-18 RX ORDER — OXYCODONE HYDROCHLORIDE 5 MG/1
TABLET ORAL
Qty: 120 TABLET | Refills: 0 | Status: SHIPPED | OUTPATIENT
Start: 2020-06-18 | End: 2020-07-17

## 2020-07-17 DIAGNOSIS — G89.29 OTHER CHRONIC PAIN: ICD-10-CM

## 2020-07-17 RX ORDER — OXYCODONE HYDROCHLORIDE 5 MG/1
TABLET ORAL
Qty: 120 TABLET | Refills: 0 | Status: SHIPPED | OUTPATIENT
Start: 2020-07-17 | End: 2020-08-07

## 2020-07-17 NOTE — TELEPHONE ENCOUNTER
Pending Prescriptions:                       Disp   Refills    oxyCODONE (ROXICODONE) 5 MG tablet [Pharma*120 ta*0        Sig: TAKE ONE TABLET BY MOUTH EVERY 6 HOURS AS NEEDED FOR           SEVERE PAIN    Last Written Prescription Date:  6/18/20  Last Fill Quantity: 120,  # refills: 0   Last office visit: 1/24/2020 with prescribing provider:     Future Office Visit:   Next 5 appointments (look out 90 days)    Jul 28, 2020 10:40 AM CDT  Office Visit with Silvia Matthews MD  [facility] ([facility]) [address] [place] [id number]  [phone]   Oct 05, 2020 10:15 AM CDT  Return Visit with Cecelia Galeano MD  [facility] ([facility]) [address] [place] [phone]  [phone]                 Routing refill request to provider for review/approval because:  Drug not on the [facility] refill protocol     Jackie Harrison, MSN, RN

## 2020-08-05 NOTE — PROGRESS NOTES
Subjective     Katherin Jonas is a 79 year old female who presents to clinic today for the following health issues:    HPI       Hypertension Follow-up      Do you check your blood pressure regularly outside of the clinic? Yes     Are you following a low salt diet? No    Are your blood pressures ever more than 140 on the top number (systolic) OR more   than 90 on the bottom number (diastolic), for example 140/90? Yes    Depression and Anxiety Follow-Up    How are you doing with your depression since your last visit? No change    How are you doing with your anxiety since your last visit?  No change    Are you having other symptoms that might be associated with depression or anxiety? No    Have you had a significant life event? No     Do you have any concerns with your use of alcohol or other drugs? No    Social History     Tobacco Use     Smoking status: Former Smoker     Last attempt to quit: 1979     Years since quittin.6     Smokeless tobacco: Never Used     Tobacco comment: no smokers in household   Substance Use Topics     Alcohol use: No     Comment: none since      Drug use: No     PHQ 2019   PHQ-9 Total Score 6 10 1   Q9: Thoughts of better off dead/self-harm past 2 weeks Not at all Not at all Not at all     RACHID-7 SCORE 7/3/2018 2018 2019   Total Score - - -   Total Score 5 5 4     Last PHQ-9 2020   1.  Little interest or pleasure in doing things 0   2.  Feeling down, depressed, or hopeless 0   3.  Trouble falling or staying asleep, or sleeping too much 0   4.  Feeling tired or having little energy 1   5.  Poor appetite or overeating 0   6.  Feeling bad about yourself 0   7.  Trouble concentrating 0   8.  Moving slowly or restless 0   Q9: Thoughts of better off dead/self-harm past 2 weeks 0   PHQ-9 Total Score 1   Difficulty at work, home, or with people Not difficult at all       Suicide Assessment Five-step Evaluation and Treatment (SAFE-T)    COPD  Follow-Up    Overall, how are your COPD symptoms since your last clinic visit?  No change    How much fatigue or shortness of breath do you have when you are walking?  Same as usual    How much shortness of breath do you have when you are resting?  Same as usual    How often do you cough? Sometimes    Have you noticed any change in your sputum/phlegm?  No    Have you experienced a recent fever? No    Please describe how far you can walk without stopping to rest:  Less than 1 block    How many flights of stairs are you able to walk up without stopping?  None    Have you had any Emergency Room Visits, Urgent Care Visits, or Hospital Admissions because of your COPD since your last office visit?  No    History   Smoking Status     Former Smoker     Quit date: 1/1/1979   Smokeless Tobacco     Never Used     Comment: no smokers in household     No results found for: FEV1, [id number]    Chronic Kidney Disease Follow-up      Do you take any over the counter pain medicine?: No      How many servings of fruits and vegetables do you eat daily?  0-1    On average, how many sweetened beverages do you drink each day (Examples: soda, juice, sweet tea, etc.  Do NOT count diet or artificially sweetened beverages)?   0    How many days per week do you exercise enough to make your heart beat faster?     How many minutes a day do you exercise enough to make your heart beat faster?     How many days per week do you miss taking your medication? 0      Patient Active Problem List   Diagnosis     Esophageal reflux     Carotid atherosclerosis     CKD (chronic kidney disease) stage 3, GFR 30-59 ml/min (H)     Insomnia     Mild major depression (H)     Advanced directives, counseling/discussion     Essential hypertension     Hyperlipidemia, unspecified     Scoliosis     Age-related osteoporosis without current pathological fracture     Postherpetic neuralgia     Other chronic pain     COPD (chronic obstructive pulmonary disease) (H)      Paroxysmal atrial fibrillation (H)     Anxiety     Anticoagulant long-term use     Heart failure with preserved left ventricular function (HFpEF) (H)     Hypothyroid     Thrombocytopenia (H)     Cardiac pacemaker in situ     Recurrent falls     Past Surgical History:   Procedure Laterality Date     C FULL ROUT OBSTE CARE, DELIV  1964     C FULL ROUT OBSTE CARE, DELIV       C FULL ROUT OBSTE CARE, DELIV       C TOTAL KNEE ARTHROPLASTY  1997    left     C TOTAL KNEE ARTHROPLASTY  2005    right     C TREAT ECTOPIC PREG,RMV TUBE/OVARY  1967    left     COLONOSCOPY  11    [facility] REPAIR OF NASAL SEPTUM         Social History     Tobacco Use     Smoking status: Former Smoker     Last attempt to quit: 1979     Years since quittin.6     Smokeless tobacco: Never Used     Tobacco comment: no smokers in household   Substance Use Topics     Alcohol use: No     Comment: none since      Family History   Problem Relation Age of Onset     Substance Abuse Father      Cancer Daughter      Depression Daughter      Hypertension Mother          Current Outpatient Medications   Medication Sig Dispense Refill     ammonium lactate (LAC-HYDRIN) 12 % external lotion Apply topically 2 times daily 500 g 11     ASPIRIN NOT PRESCRIBED (INTENTIONAL) Please choose reason not prescribed, below 0 each 0     atorvastatin (LIPITOR) 10 MG tablet Take 1 tablet (10 mg) by mouth daily 90 tablet 3     CALCIUM CITRATE + D OR 1,200 mg daily        diltiazem ER COATED BEADS (CARDIZEM CD) 360 MG 24 hr capsule Take 360 mg by mouth       FLUoxetine (PROZAC) 40 MG capsule Take 1 capsule (40 mg) by mouth daily 90 capsule 3     gabapentin (NEURONTIN) 300 MG capsule Take 1 capsule (300 mg) by mouth 4 times daily 360 capsule 3     levothyroxine (SYNTHROID/LEVOTHROID) 50 MCG tablet Take 1 tablet (50 mcg) by mouth daily 90 tablet 3     Lutein 6 MG TABS Take  by mouth daily.        ondansetron (ZOFRAN-ODT) 4 MG ODT tab Take 4 mg by mouth every 8 hours as needed for nausea       [START ON 8/17/2020] oxyCODONE (ROXICODONE) 5 MG tablet TAKE ONE TABLET BY MOUTH EVERY 6 HOURS AS NEEDED FOR SEVERE PAIN 120 tablet 0     SENNA-GEN 8.6 MG OR TABS 2 TABLETS AT Twice daily 120 prn     traZODone (DESYREL) 50 MG tablet Take 50 mg by mouth At Bedtime       XARELTO 15 MG TABS tablet Take 1 tablet (15 mg) by mouth daily 20 tablet 0     acetaminophen 500 MG CAPS Take 1,000 mg by mouth 3 times daily as needed       nystatin (MYCOSTATIN) cream APPLY TOPICALLY DAILY AS NEEDED(RASH UNDER BREAST AND IN GROIN) (Patient not taking: Reported on 4/10/2020) 60 g 11     nystatin (NYSTOP) 982604 UNIT/GM POWD APPLY 1 DOSE TOPICALLY THREE TIMES DAILY AS NEEDED (Patient not taking: Reported on 4/10/2020) 120 g 11     Allergies   Allergen Reactions     Antibiotic [Amides]      After awhile she has trouble swallowing     Nsaids Rash     BP Readings from Last 3 Encounters:   08/07/20 130/70   04/10/20 (!) 161/78   01/24/20 (!) 142/76    Wt Readings from Last 3 Encounters:   08/07/20 46.2 kg (101 lb 12.8 oz)   04/10/20 46.4 kg (102 lb 6.4 oz)   01/24/20 44.9 kg (99 lb)                    Reviewed and updated as needed this visit by Provider         Review of Systems   Constitutional, HEENT, cardiovascular, pulmonary, GI, , musculoskeletal, neuro, skin, endocrine and psych systems are negative, except as otherwise noted.      Objective    LMP 02/01/2011   There is no height or weight on file to calculate BMI.  Physical Exam  Constitutional:       Appearance: Normal appearance.   HENT:      Head: Normocephalic and atraumatic.      Right Ear: Tympanic membrane normal.      Left Ear: Tympanic membrane normal.      Ears:      Comments: Hearing aids in place     Nose: Nose normal.   Neck:      Musculoskeletal: Normal range of motion.   Cardiovascular:      Rate and Rhythm: Normal rate and regular rhythm.      Pulses: Normal pulses.       Heart sounds: Normal heart sounds. No murmur. No friction rub. No gallop.    Pulmonary:      Effort: Pulmonary effort is normal.      Breath sounds: Normal breath sounds.   Musculoskeletal:      Comments: Severe scoliosis.    Neurological:      General: No focal deficit present.      Mental Status: She is alert and oriented to person, place, and time.   Psychiatric:         Mood and Affect: Mood normal.         Behavior: Behavior normal.            Diagnostic Test Results:  Labs reviewed in Epic        Assessment & Plan   Problem List Items Addressed This Visit     CKD (chronic kidney disease) stage 3, GFR 30-59 ml/min (H)     Recheck chemistries         Mild major depression (H) - Primary     Stable on the current dose of prozac  Continue current dose  Recheck in 3-6 months         Essential hypertension     stable         Relevant Orders    BASIC METABOLIC PANEL (Completed)    Albumin Random Urine Quantitative with Creat Ratio    Lipid panel reflex to direct LDL Fasting (Completed)    Hyperlipidemia, unspecified     Update lipid panel. Continue atorvastatin         Relevant Medications    atorvastatin (LIPITOR) 10 MG tablet    Scoliosis     Has had increased difficulty with self care and at risk for falls due to her scoliosis . Care co-ordination referral to help set up her daughter as her health aid  Offered therapy for low back pain . She instead wanted some home exercises . Info printed and handed         Relevant Medications    oxyCODONE (ROXICODONE) 5 MG tablet (Start on 8/17/2020)    Other Relevant Orders    CARE COORDINATION REFERRAL    Other chronic pain    Relevant Medications    oxyCODONE (ROXICODONE) 5 MG tablet (Start on 8/17/2020)    Other Relevant Orders    Drug Abuse Screen Panel 13, Urine (Pain Care Package)    COPD (chronic obstructive pulmonary disease) (H)    Paroxysmal atrial fibrillation (H)     Rate controlled. Continue diltiazem and xarelto. Followed by cardiology         Anticoagulant  long-term use    Heart failure with preserved left ventricular function (HFpEF) (H)     euvolemic         Hypothyroid    Thrombocytopenia (H)    Relevant Orders    CBC with platelets (Completed)    Cardiac pacemaker in situ      Other Visit Diagnoses     At risk for falls        Relevant Orders    CARE COORDINATION REFERRAL                 See Patient Instructions  Return in about 6 months (around 2/7/2021).    Ainsley Pugh MD  [facility]

## 2020-08-06 ASSESSMENT — PATIENT HEALTH QUESTIONNAIRE - PHQ9: SUM OF ALL RESPONSES TO PHQ QUESTIONS 1-9: 1

## 2020-08-07 ENCOUNTER — PATIENT OUTREACH (OUTPATIENT)
Dept: CARE COORDINATION | Facility: CLINIC | Age: 80
End: 2020-08-07

## 2020-08-07 ENCOUNTER — VIRTUAL VISIT (OUTPATIENT)
Dept: FAMILY MEDICINE | Facility: OTHER | Age: 80
End: 2020-08-07
Payer: COMMERCIAL

## 2020-08-07 ENCOUNTER — OFFICE VISIT (OUTPATIENT)
Dept: FAMILY MEDICINE | Facility: OTHER | Age: 80
End: 2020-08-07
Payer: COMMERCIAL

## 2020-08-07 ENCOUNTER — TELEPHONE (OUTPATIENT)
Dept: FAMILY MEDICINE | Facility: OTHER | Age: 80
End: 2020-08-07

## 2020-08-07 VITALS
TEMPERATURE: 98.6 F | DIASTOLIC BLOOD PRESSURE: 70 MMHG | SYSTOLIC BLOOD PRESSURE: 130 MMHG | HEIGHT: 58 IN | RESPIRATION RATE: 14 BRPM | WEIGHT: 101.8 LBS | BODY MASS INDEX: 21.37 KG/M2

## 2020-08-07 DIAGNOSIS — E03.9 HYPOTHYROIDISM, UNSPECIFIED TYPE: ICD-10-CM

## 2020-08-07 DIAGNOSIS — Z95.0 CARDIAC PACEMAKER IN SITU: ICD-10-CM

## 2020-08-07 DIAGNOSIS — Z91.81 AT RISK FOR FALLS: ICD-10-CM

## 2020-08-07 DIAGNOSIS — E78.5 HYPERLIPIDEMIA, UNSPECIFIED HYPERLIPIDEMIA TYPE: ICD-10-CM

## 2020-08-07 DIAGNOSIS — I50.30 HEART FAILURE WITH PRESERVED LEFT VENTRICULAR FUNCTION (HFPEF) (H): ICD-10-CM

## 2020-08-07 DIAGNOSIS — M41.9 SCOLIOSIS, UNSPECIFIED SCOLIOSIS TYPE, UNSPECIFIED SPINAL REGION: ICD-10-CM

## 2020-08-07 DIAGNOSIS — D69.6 THROMBOCYTOPENIA (H): ICD-10-CM

## 2020-08-07 DIAGNOSIS — F32.0 MILD MAJOR DEPRESSION (H): Primary | ICD-10-CM

## 2020-08-07 DIAGNOSIS — D64.9 ANEMIA, UNSPECIFIED TYPE: ICD-10-CM

## 2020-08-07 DIAGNOSIS — J44.9 CHRONIC OBSTRUCTIVE PULMONARY DISEASE, UNSPECIFIED COPD TYPE (H): ICD-10-CM

## 2020-08-07 DIAGNOSIS — I48.0 PAROXYSMAL ATRIAL FIBRILLATION (H): ICD-10-CM

## 2020-08-07 DIAGNOSIS — R35.0 URINARY FREQUENCY: ICD-10-CM

## 2020-08-07 DIAGNOSIS — G89.29 OTHER CHRONIC PAIN: Primary | ICD-10-CM

## 2020-08-07 DIAGNOSIS — I10 ESSENTIAL HYPERTENSION: ICD-10-CM

## 2020-08-07 DIAGNOSIS — G89.29 OTHER CHRONIC PAIN: ICD-10-CM

## 2020-08-07 DIAGNOSIS — Z79.01 ANTICOAGULANT LONG-TERM USE: ICD-10-CM

## 2020-08-07 DIAGNOSIS — N18.30 CKD (CHRONIC KIDNEY DISEASE) STAGE 3, GFR 30-59 ML/MIN (H): ICD-10-CM

## 2020-08-07 LAB
AMPHETAMINES UR QL: NOT DETECTED NG/ML
ANION GAP SERPL CALCULATED.3IONS-SCNC: 3 MMOL/L (ref 3–14)
BARBITURATES UR QL SCN: NOT DETECTED NG/ML
BENZODIAZ UR QL SCN: ABNORMAL NG/ML
BUN SERPL-MCNC: 13 MG/DL (ref 7–30)
BUPRENORPHINE UR QL: NOT DETECTED NG/ML
CALCIUM SERPL-MCNC: 8.8 MG/DL (ref 8.5–10.1)
CANNABINOIDS UR QL: NOT DETECTED NG/ML
CHLORIDE SERPL-SCNC: 107 MMOL/L (ref 94–109)
CHOLEST SERPL-MCNC: 152 MG/DL
CO2 SERPL-SCNC: 31 MMOL/L (ref 20–32)
COCAINE UR QL SCN: NOT DETECTED NG/ML
CREAT SERPL-MCNC: 0.91 MG/DL (ref 0.52–1.04)
CREAT UR-MCNC: 144 MG/DL
D-METHAMPHET UR QL: NOT DETECTED NG/ML
ERYTHROCYTE [DISTWIDTH] IN BLOOD BY AUTOMATED COUNT: 15.1 % (ref 10–15)
GFR SERPL CREATININE-BSD FRML MDRD: 60 ML/MIN/{1.73_M2}
GLUCOSE SERPL-MCNC: 94 MG/DL (ref 70–99)
HCT VFR BLD AUTO: 34.8 % (ref 35–47)
HDLC SERPL-MCNC: 75 MG/DL
HGB BLD-MCNC: 11.3 G/DL (ref 11.7–15.7)
LDLC SERPL CALC-MCNC: 58 MG/DL
MCH RBC QN AUTO: 30.2 PG (ref 26.5–33)
MCHC RBC AUTO-ENTMCNC: 32.5 G/DL (ref 31.5–36.5)
MCV RBC AUTO: 93 FL (ref 78–100)
METHADONE UR QL SCN: NOT DETECTED NG/ML
MICROALBUMIN UR-MCNC: 217 MG/L
MICROALBUMIN/CREAT UR: 150.69 MG/G CR (ref 0–25)
NONHDLC SERPL-MCNC: 77 MG/DL
OPIATES UR QL SCN: NOT DETECTED NG/ML
OXYCODONE UR QL SCN: ABNORMAL NG/ML
PCP UR QL SCN: NOT DETECTED NG/ML
PLATELET # BLD AUTO: 179 10E9/L (ref 150–450)
POTASSIUM SERPL-SCNC: 4.3 MMOL/L (ref 3.4–5.3)
PROPOXYPH UR QL: NOT DETECTED NG/ML
RBC # BLD AUTO: 3.74 10E12/L (ref 3.8–5.2)
SODIUM SERPL-SCNC: 141 MMOL/L (ref 133–144)
TRICYCLICS UR QL SCN: NOT DETECTED NG/ML
TRIGL SERPL-MCNC: 94 MG/DL
WBC # BLD AUTO: 5.2 10E9/L (ref 4–11)

## 2020-08-07 PROCEDURE — 99207 ZZC NO BILLABLE SERVICE THIS VISIT: CPT | Performed by: FAMILY MEDICINE

## 2020-08-07 PROCEDURE — 85027 COMPLETE CBC AUTOMATED: CPT | Performed by: FAMILY MEDICINE

## 2020-08-07 PROCEDURE — 36415 COLL VENOUS BLD VENIPUNCTURE: CPT | Performed by: FAMILY MEDICINE

## 2020-08-07 PROCEDURE — 80306 DRUG TEST PRSMV INSTRMNT: CPT | Performed by: FAMILY MEDICINE

## 2020-08-07 PROCEDURE — 99214 OFFICE O/P EST MOD 30 MIN: CPT | Performed by: FAMILY MEDICINE

## 2020-08-07 PROCEDURE — 82043 UR ALBUMIN QUANTITATIVE: CPT | Performed by: FAMILY MEDICINE

## 2020-08-07 PROCEDURE — 80061 LIPID PANEL: CPT | Performed by: FAMILY MEDICINE

## 2020-08-07 PROCEDURE — 80048 BASIC METABOLIC PNL TOTAL CA: CPT | Performed by: FAMILY MEDICINE

## 2020-08-07 RX ORDER — ATORVASTATIN CALCIUM 10 MG/1
10 TABLET, FILM COATED ORAL DAILY
Qty: 90 TABLET | Refills: 3 | Status: SHIPPED | OUTPATIENT
Start: 2020-08-07

## 2020-08-07 RX ORDER — OXYCODONE HYDROCHLORIDE 5 MG/1
TABLET ORAL
Qty: 120 TABLET | Refills: 0 | Status: SHIPPED | OUTPATIENT
Start: 2020-08-17 | End: 2020-09-21

## 2020-08-07 ASSESSMENT — PAIN SCALES - GENERAL: PAINLEVEL: SEVERE PAIN (6)

## 2020-08-07 ASSESSMENT — MIFFLIN-ST. JEOR: SCORE: 818.57

## 2020-08-07 NOTE — ASSESSMENT & PLAN NOTE
Has had increased difficulty with self care and at risk for falls due to her scoliosis . Care co-ordination referral to help set up her daughter as her health aid  Offered therapy for low back pain . She instead wanted some home exercises . Info printed and handed

## 2020-08-07 NOTE — PATIENT INSTRUCTIONS
Patient Education     Back Exercises: Lower Back Stretch    To start, sit in a chair with your feet flat on the floor. Shift your weight slightly forward. Relax, and keep your ears, shoulders, and hips aligned while you do the following:    Sit with your feet well apart.    Bend forward and touch the floor with the backs of your hands. Relax and let your body drop.    Hold for 20 seconds. Return to starting position.    Repeat 2 times.   Date Last Reviewed: 11/1/2017 2000-2019 [employer]. [address] [place] [address]. All rights reserved. This information is not intended as a substitute for professional medical care. Always follow your healthcare professional's instructions.           Patient Education     Back Exercises: Leg Pull    To start, lie on your back with your knees bent and feet flat on the floor. Don t press your neck or lower back to the floor. Breathe deeply. You should feel comfortable and relaxed in this position.    Pull one knee to your chest.    Hold for 30 to 60 seconds. Return to starting position.    Repeat 2 times.    Switch legs.    For a double leg pull, pull both legs to your chest at the same time. Repeat 2 times.  For your safety, check with your healthcare provider before starting an exercise program.  Date Last Reviewed: 3/1/2018    [id number] [employer]. [address] [place] [address]. All rights reserved. This information is not intended as a substitute for professional medical care. Always follow your healthcare professional's instructions.           Patient Education     Back Exercises: Knee Lift         To start, lie on your back with your knees bent and feet flat on the floor. Don t press your neck or lower back to the floor. Breathe deeply. You should feel comfortable and relaxed in this position:    Start by tightening your abdominal muscles.    Lift one bent knee off the floor 2 to 4 inches.    Hold for 10 seconds.  Return to start position.    Repeat 3 times.    Switch legs.  Date Last Reviewed: 3/1/2018    [id number] [employer]. [address] [place] [address]. All rights reserved. This information is not intended as a substitute for professional medical care. Always follow your healthcare professional's instructions.           Patient Education     Back Exercises: Arm Reach    Do this exercise on your hands and knees. Keep your knees under your hips and your hands under your shoulders. Keep your spine in a neutral position (not arched or sagging). Be sure to maintain your neck s natural curve:    Stretch one arm straight out in front of you. Don t raise your head or let your supporting shoulder sag.    Hold for 5 seconds.    Return to starting position.    Repeat 5 times.    Switch arms.  Date Last Reviewed: 3/1/2018    [id number] [employer]. [address] [place] [address]. All rights reserved. This information is not intended as a substitute for professional medical care. Always follow your healthcare professional's instructions.           Patient Education     Back Exercises: Abdominal Lift Brace with Marching    The abdominal lift brace with march strengthens your lower abdominal muscles, helping you keep your pelvis and back stable:    Lie on the floor with both knees bent. Put your feet flat on the floor and your arms by your sides. Tighten your abdominal muscles. Be sure to continue to breathe.    Lift one bent knee about 2 inches then return it to the floor and lift the other about 2 inches. Keep your abdominal muscles tight and continue to breathe. These motions should be slow and controlled without your pelvis rocking side to side.    Repeat 10 times.  Date Last Reviewed: 3/1/2018    [id number] [employer]. [address] [place] [address]. All rights reserved. This information is not intended as a substitute for professional medical care. Always  follow your healthcare professional's instructions.           Patient Education     Back Exercises: Back Release  Do this exercise on your hands and knees. Keep your knees under your hips and your hands under your shoulders.        Relax your abdominal and buttocks muscles, lift your head, and let your back sag. Be sure to keep your weight evenly distributed. Don t sit back on your hips.     Hold for 5 seconds.    Return to starting position.    Tuck your head and lift (arch) your back.    Hold for 5 seconds    Return to starting position.    Repeat 5 times.  Date Last Reviewed: 3/1/2018    [id number] [employer]. [address] [place] [address]. All rights reserved. This information is not intended as a substitute for professional medical care. Always follow your healthcare professional's instructions.

## 2020-08-07 NOTE — TELEPHONE ENCOUNTER
Left message asking patient to return call.  Please inform patient of RESULTS from Provider below.       Urine drug screens showed that she has been taking benzo's which are muscle relaxants along with the opiates. We do not have a record that she is on these meds. I would recommend to discuss this with PCP. Will forward to PCP to review as well.   CBC shows sign of mild anemia.

## 2020-08-07 NOTE — LETTER
[facility] CARE COORDINATION  [address]  [facility] [address]    August 10, 2020    Katherin Jonas  [id number] [address] [place] [id number]      Dear Katherin,    I am a clinic community health worker who works with Silvia Matthews MD at [facility]. I have been trying to reach you recently to introduce Clinic Care Coordination and to see if there was anything I could assist you with.  Below is a description of clinic care coordination and how I can further assist you.      The clinic care coordination team is made up of a registered nurse,  and community health worker who understand the health care system. The goal of clinic care coordination is to help you manage your health and improve access to the health care system in the most efficient manner. The team can assist you in meeting your health care goals by providing education, coordinating services, strengthening the communication among your providers and supporting you with any resource needs.    Please feel free to contact me at [phone] with any questions or concerns. We are focused on providing you with the highest-quality healthcare experience possible and that all starts with you.     Sincerely,     Traci CRUZ, Community Health Worker  Clinic Care Coordination  [facility] : [facility]  Phone: [phone]

## 2020-08-07 NOTE — PROGRESS NOTES
"Katherin Jonas is a 79 year old female who is being evaluated via a billable telephone visit.      The patient has been notified of following:     \"This telephone visit will be conducted via a call between you and your physician/provider. We have found that certain health care needs can be provided without the need for a physical exam.  This service lets us provide the care you need with a short phone conversation.  If a prescription is necessary we can send it directly to your pharmacy.  If lab work is needed we can place an order for that and you can then stop by our lab to have the test done at a later time.    Telephone visits are billed at different rates depending on your insurance coverage. During this emergency period, for some insurers they may be billed the same as an in-person visit.  Please reach out to your insurance provider with any questions.    If during the course of the call the physician/provider feels a telephone visit is not appropriate, you will not be charged for this service.\"    Patient has given verbal consent for Telephone visit?  Yes    What phone number would you like to be contacted at? [phone]    How would you like to obtain your AVS? Mail a copy    Subjective     Katherin Jonas is a 79 year old female who presents via phone visit today for the following health issues:    HPI    Follow up labs     Patient was seen earlier today by a colleague labs drawn.  First she was told she had mild anemia.  She denies blood in her stools.  She denies melena.  She states her appetite is good.  She eats meat.  She does admit to frequent urination.    She then had drug screen which came back positive for morphine, oxycodone and benzodiazepines.  Patient is only on oxycodone.  She has not been on morphine for a year and a half.  I have never given her benzodiazepine.  She states the only change in her medications has been an increase in routine for her eyesight.  She has been taking trazodone " for sleep.  She denies having any extra morphine at home that she would have taken.  She denies receiving other benzodiazepines elsewhere.  Reviewed prescription monitoring program through the [facility] which was negative for prescriptions received elsewhere.    Patient Active Problem List   Diagnosis     Esophageal reflux     Carotid atherosclerosis     CKD (chronic kidney disease) stage 3, GFR 30-59 ml/min (H)     Insomnia     Mild major depression (H)     Advanced directives, counseling/discussion     Essential hypertension     Hyperlipidemia, unspecified     Scoliosis     Age-related osteoporosis without current pathological fracture     Postherpetic neuralgia     Other chronic pain     COPD (chronic obstructive pulmonary disease) (H)     Paroxysmal atrial fibrillation (H)     Anxiety     Anticoagulant long-term use     Heart failure with preserved left ventricular function (HFpEF) (H)     Hypothyroid     Thrombocytopenia (H)     Cardiac pacemaker in situ     Recurrent falls     Past Surgical History:   Procedure Laterality Date     C FULL ROUT OBSTE CARE, DELIV  1964     C FULL ROUT OBSTE CARE, DELIV       C FULL ROUT OBSTE CARE, DELIV       C TOTAL KNEE ARTHROPLASTY  1997    left     C TOTAL KNEE ARTHROPLASTY  2005    right     C TREAT ECTOPIC PREG,RMV TUBE/OVARY  1967    left     COLONOSCOPY  11    [facility] REPAIR OF NASAL SEPTUM         Social History     Tobacco Use     Smoking status: Former Smoker     Last attempt to quit: 1979     Years since quittin.6     Smokeless tobacco: Never Used     Tobacco comment: no smokers in household   Substance Use Topics     Alcohol use: No     Comment: none since      Family History   Problem Relation Age of Onset     Substance Abuse Father      Cancer Daughter      Depression Daughter      Hypertension Mother            Reviewed and updated as needed this visit by  Provider         Review of Systems   CONSTITUTIONAL: NEGATIVE for fever, chills, change in weight       Objective   Reported vitals:  LMP 02/01/2011    Gen: alert and no distress  PSYCH: Alert and oriented times 3; coherent speech, normal   rate and volume, able to articulate logical thoughts, able   to abstract reason, no tangential thoughts, no hallucinations   or delusions  Her affect is normal  RESP: No cough, no audible wheezing, able to talk in full sentences  Remainder of exam unable to be completed due to telephone visits    Diagnostic Test Results:  Labs reviewed in Epic  Results for orders placed or performed in visit on 08/07/20 (from the past 24 hour(s))   CBC with platelets   Result Value Ref Range    WBC 5.2 4.0 - 11.0 10e9/L    RBC Count 3.74 (L) 3.8 - 5.2 10e12/L    Hemoglobin 11.3 (L) 11.7 - 15.7 g/dL    Hematocrit 34.8 (L) 35.0 - 47.0 %    MCV 93 78 - 100 fl    MCH 30.2 26.5 - 33.0 pg    MCHC 32.5 31.5 - 36.5 g/dL    RDW 15.1 (H) 10.0 - 15.0 %    Platelet Count 179 150 - 450 10e9/L   Drug Abuse Screen Panel 13, Urine (Pain Care Package)   Result Value Ref Range    Cannabinoids (27-mrg-0-carboxy-9-THC) Not Detected NDET^Not Detected ng/mL    Phencyclidine (Phencyclidine) Not Detected NDET^Not Detected ng/mL    Cocaine (Benzoylecgonine) Not Detected NDET^Not Detected ng/mL    Methamphetamine (d-Methamphetamine) Not Detected NDET^Not Detected ng/mL    Opiates (Morphine) Not Detected NDET^Not Detected ng/mL    Amphetamine (d-Amphetamine) Not Detected NDET^Not Detected ng/mL    Benzodiazepines (Nordiazepam) Detected, Abnormal Result (A) NDET^Not Detected ng/mL    Tricyclic Antidepressants (Desipramine) Not Detected NDET^Not Detected ng/mL    Methadone (Methadone) Not Detected NDET^Not Detected ng/mL    Barbiturates (Butalbital) Not Detected NDET^Not Detected ng/mL    Oxycodone (Oxycodone) Detected, Abnormal Result (A) NDET^Not Detected ng/mL    Propoxyphene (Norpropoxyphene) Not Detected NDET^Not  Detected ng/mL    Buprenorphine (Buprenorphine) Not Detected NDET^Not Detected ng/mL           Assessment/Plan:    1. Other chronic pain  Patient denies taking any other medications.  Urine results were surprising.  Will repeat a comprehensive urine screen.    - Drug  Screen Comprehensive , Urine with Reported Meds ([employer]) (Pain Care Package); Future    2. Anemia, unspecified type  Mild but this is also new.  Will repeat CBC and add other labs.    - Blood Morphology Pathologist Review; Future  - CBC with platelets differential; Future  - Reticulocyte Count; Future  - Iron and iron binding capacity; Future  - Ferritin; Future    3. Urinary frequency  She feels her urinary frequency has increased and will obtain a UA when she returns.    - *UA reflex to Microscopic and Culture ([facility] (except [place] and [facility]); Future    No follow-ups on file.      Phone call duration:  6 minutes    Silvia Matthews MD

## 2020-08-07 NOTE — PROGRESS NOTES
Clinic Care Coordination Contact  [facility]/Voicemail       Clinical Data: CHW Outreach  Outreach attempted x 1. Left message on patient's voicemail with call back information and requested return call.    Plan: CHW will try to reach patient again in 1-2 business days.      Traci CRUZ Community Health Worker  Clinic Care Coordination  [facility] : [facility] [place] [place]  Phone: [phone]      Reason for Referral: Patient needs help with showers and would like her daughter to be her health aide and wants to have some information on how this could be done  Additional pertinent details:

## 2020-08-10 NOTE — PROGRESS NOTES
Clinic Care Coordination Contact  [facility]/Voicemail       Clinical Data: CHW Outreach  Outreach attempted x 2. Left message on patient's voicemail with call back information and requested return call.    Plan: CHW will send care coordination introduction letter with care coordinator contact information and explanation of care coordination services via mail.     CHW will do no further outreaches at this time.    Traci CRUZ Community Health Worker  Clinic Care Coordination  [facility] : [facility] [place] [place]  Phone: [phone]

## 2020-08-12 DIAGNOSIS — N30.00 ACUTE CYSTITIS WITHOUT HEMATURIA: Primary | ICD-10-CM

## 2020-08-12 DIAGNOSIS — G89.29 OTHER CHRONIC PAIN: ICD-10-CM

## 2020-08-12 DIAGNOSIS — D64.9 ANEMIA, UNSPECIFIED TYPE: ICD-10-CM

## 2020-08-12 DIAGNOSIS — R35.0 URINARY FREQUENCY: Primary | ICD-10-CM

## 2020-08-12 LAB
ALBUMIN UR-MCNC: 30 MG/DL
APPEARANCE UR: ABNORMAL
BACTERIA #/AREA URNS HPF: ABNORMAL /HPF
BASOPHILS # BLD AUTO: 0 10E9/L (ref 0–0.2)
BASOPHILS NFR BLD AUTO: 0.2 %
BILIRUB UR QL STRIP: NEGATIVE
COLOR UR AUTO: YELLOW
DIFFERENTIAL METHOD BLD: NORMAL
EOSINOPHIL # BLD AUTO: 0 10E9/L (ref 0–0.7)
EOSINOPHIL NFR BLD AUTO: 0.8 %
ERYTHROCYTE [DISTWIDTH] IN BLOOD BY AUTOMATED COUNT: 14.9 % (ref 10–15)
FERRITIN SERPL-MCNC: 26 NG/ML (ref 8–252)
FOLATE SERPL-MCNC: 19.4 NG/ML
GLUCOSE UR STRIP-MCNC: NEGATIVE MG/DL
HCT VFR BLD AUTO: 36.5 % (ref 35–47)
HGB BLD-MCNC: 12 G/DL (ref 11.7–15.7)
HGB UR QL STRIP: ABNORMAL
IRON SATN MFR SERPL: 14 % (ref 15–46)
IRON SERPL-MCNC: 58 UG/DL (ref 35–180)
KETONES UR STRIP-MCNC: 40 MG/DL
LEUKOCYTE ESTERASE UR QL STRIP: ABNORMAL
LYMPHOCYTES # BLD AUTO: 0.8 10E9/L (ref 0.8–5.3)
LYMPHOCYTES NFR BLD AUTO: 15.3 %
MCH RBC QN AUTO: 30 PG (ref 26.5–33)
MCHC RBC AUTO-ENTMCNC: 32.9 G/DL (ref 31.5–36.5)
MCV RBC AUTO: 91 FL (ref 78–100)
MONOCYTES # BLD AUTO: 0.8 10E9/L (ref 0–1.3)
MONOCYTES NFR BLD AUTO: 14.9 %
NEUTROPHILS # BLD AUTO: 3.6 10E9/L (ref 1.6–8.3)
NEUTROPHILS NFR BLD AUTO: 68.8 %
NITRATE UR QL: POSITIVE
NON-SQ EPI CELLS #/AREA URNS LPF: ABNORMAL /LPF
PH UR STRIP: 7.5 PH (ref 5–7)
PLATELET # BLD AUTO: 193 10E9/L (ref 150–450)
RBC # BLD AUTO: 4 10E12/L (ref 3.8–5.2)
RBC #/AREA URNS AUTO: >100 /HPF
RETICS # AUTO: NORMAL 10E9/L (ref 25–95)
RETICS/RBC NFR AUTO: 1.1 % (ref 0.5–2)
SOURCE: ABNORMAL
SP GR UR STRIP: 1.02 (ref 1–1.03)
TIBC SERPL-MCNC: 422 UG/DL (ref 240–430)
UROBILINOGEN UR STRIP-ACNC: 1 EU/DL (ref 0.2–1)
VIT B12 SERPL-MCNC: 399 PG/ML (ref 193–986)
WBC # BLD AUTO: 5.2 10E9/L (ref 4–11)
WBC #/AREA URNS AUTO: >100 /HPF

## 2020-08-12 PROCEDURE — 87088 URINE BACTERIA CULTURE: CPT | Performed by: FAMILY MEDICINE

## 2020-08-12 PROCEDURE — 83550 IRON BINDING TEST: CPT | Performed by: FAMILY MEDICINE

## 2020-08-12 PROCEDURE — 87186 SC STD MICRODIL/AGAR DIL: CPT | Performed by: FAMILY MEDICINE

## 2020-08-12 PROCEDURE — 99000 SPECIMEN HANDLING OFFICE-LAB: CPT | Performed by: FAMILY MEDICINE

## 2020-08-12 PROCEDURE — 36415 COLL VENOUS BLD VENIPUNCTURE: CPT | Performed by: FAMILY MEDICINE

## 2020-08-12 PROCEDURE — 87086 URINE CULTURE/COLONY COUNT: CPT | Performed by: FAMILY MEDICINE

## 2020-08-12 PROCEDURE — 83540 ASSAY OF IRON: CPT | Performed by: FAMILY MEDICINE

## 2020-08-12 PROCEDURE — 82728 ASSAY OF FERRITIN: CPT | Performed by: FAMILY MEDICINE

## 2020-08-12 PROCEDURE — 82746 ASSAY OF FOLIC ACID SERUM: CPT | Performed by: FAMILY MEDICINE

## 2020-08-12 PROCEDURE — 85045 AUTOMATED RETICULOCYTE COUNT: CPT | Performed by: FAMILY MEDICINE

## 2020-08-12 PROCEDURE — 81001 URINALYSIS AUTO W/SCOPE: CPT | Performed by: FAMILY MEDICINE

## 2020-08-12 PROCEDURE — 80307 DRUG TEST PRSMV CHEM ANLYZR: CPT | Mod: 90 | Performed by: FAMILY MEDICINE

## 2020-08-12 PROCEDURE — 85025 COMPLETE CBC W/AUTO DIFF WBC: CPT | Performed by: FAMILY MEDICINE

## 2020-08-12 PROCEDURE — 82607 VITAMIN B-12: CPT | Performed by: FAMILY MEDICINE

## 2020-08-12 RX ORDER — CIPROFLOXACIN 250 MG/1
250 TABLET, FILM COATED ORAL 2 TIMES DAILY
Qty: 6 TABLET | Refills: 0 | Status: SHIPPED | OUTPATIENT
Start: 2020-08-12 | End: 2020-08-15

## 2020-08-12 NOTE — PROGRESS NOTES
Clinic Care Coordination Contact  [facility]/Voicemail       Clinical Data: CHW Outreach  Outreach attempted x 3. Left message on patient's voicemail with call back information and requested return call. CHW returning patients call.    Plan: CHW will do no further outreaches at this time.    Traci CRUZ Community Health Worker  Clinic Care Coordination  [facility] : [facility] [place]  Phone: [phone]    Reason for Referral: Patient needs help with showers and would like her daughter to be her health aide and wants to have some information on how this could be done

## 2020-08-13 ENCOUNTER — PATIENT OUTREACH (OUTPATIENT)
Dept: CARE COORDINATION | Facility: CLINIC | Age: 80
End: 2020-08-13

## 2020-08-13 LAB — COPATH REPORT: NORMAL

## 2020-08-13 NOTE — PROGRESS NOTES
Clinic Care Coordination Contact  [facility]/Voicemail       Clinical Data: Care Coordinator Outreach  Outreach attempted x 4.  Left message on patient's voicemail with call back information and requested return call. CHW returning patient call.    Plan: CHW will do no further outreaches at this time.    Traci CRUZ Community Health Worker  Clinic Care Coordination  [facility] : [place] [place] [facility] [place]  Phone: [phone]      Reason for Referral: Patient needs help with showers and would like her daughter to be her health aide and wants to have some information on how this could be done

## 2020-08-14 ENCOUNTER — TELEPHONE (OUTPATIENT)
Dept: FAMILY MEDICINE | Facility: OTHER | Age: 80
End: 2020-08-14

## 2020-08-14 LAB
BACTERIA SPEC CULT: ABNORMAL
SPECIMEN SOURCE: ABNORMAL

## 2020-08-14 NOTE — TELEPHONE ENCOUNTER
Lm for patient to call us back    Tori Boston, APRN CNP  [facility]               TC patient. Started on Ciprofloxacin for UTI. Culture shows that this should adequately treat her UTI. Please let her know this and have her follow up if symptoms do not improve.

## 2020-08-15 LAB — PAIN DRUG SCR UR W RPTD MEDS: NORMAL

## 2020-08-25 ENCOUNTER — TELEPHONE (OUTPATIENT)
Dept: FAMILY MEDICINE | Facility: OTHER | Age: 80
End: 2020-08-25

## 2020-08-25 NOTE — TELEPHONE ENCOUNTER
Reason for Call:  Other Questions/Medication Refill    Detailed comments: Patient is calling in today with a some questions about what she was told when doctor was on vacation. How did patients blood test come back? Is patient suppose to take Iron and Linsinoprol at this time? Patient stated that the doctor she saw told her to start taking those due to blood test results. Patient is also requesting to refill oxyCODONE (ROXICODONE) 5 MG tablet, please send to [facility] in [place]. Thank you    Phone Number Patient can be reached at: Home number on file [phone] (home)    Best Time: anytime    Can we leave a detailed message on this number? YES    Call taken on 8/25/2020 at 10:26 AM by Paola Coronel

## 2020-08-25 NOTE — TELEPHONE ENCOUNTER
She can take lisinopril and iron together.  Urine drug screen came back as expected.  Oxycodone was sent over on 8/17/20, she should not be due for refill until September 16.

## 2020-08-25 NOTE — TELEPHONE ENCOUNTER
Patient states she never got the oxycodone medication because the pharmacy never received.   Patient wanted to know if she should take the lisinopril because she was told from another provider that she needs to take lisinopril. Provider please advise, if so please sent the prescription to [facility] pharmacy.     I Called the  pharmacy regarding to the oxycodone , they have it on file since 8/7 and pt never pick it up.     Left message for pt to call back.       Mata Sheehan MA

## 2020-08-28 NOTE — TELEPHONE ENCOUNTER
Please let the patient know that the pharmacy has her oxycodone prescription.      Chart reviewed, patient hasn't been on lisinopril since 2011, so she shouldn't be on it.  Perhaps she meant Lipitor?

## 2020-08-28 NOTE — TELEPHONE ENCOUNTER
Pt informed.  She got the oxy med at the pharmacy.  Thank you for clarifying the lisinopril med info.

## 2020-09-08 ENCOUNTER — VIRTUAL VISIT (OUTPATIENT)
Dept: FAMILY MEDICINE | Facility: OTHER | Age: 80
End: 2020-09-08
Payer: COMMERCIAL

## 2020-09-08 DIAGNOSIS — I48.0 PAROXYSMAL ATRIAL FIBRILLATION (H): ICD-10-CM

## 2020-09-08 PROCEDURE — 99207 ZZC NO BILLABLE SERVICE THIS VISIT: CPT | Mod: 95 | Performed by: PHYSICIAN ASSISTANT

## 2020-09-08 RX ORDER — RIVAROXABAN 15 MG/1
15 TABLET, FILM COATED ORAL DAILY
Qty: 90 TABLET | Refills: 1 | Status: SHIPPED | OUTPATIENT
Start: 2020-09-08 | End: 2021-01-01

## 2020-09-08 NOTE — PROGRESS NOTES
"Katherin Jonas is a 79 year old female who is being evaluated via a billable telephone visit.      The patient has been notified of following:     \"This telephone visit will be conducted via a call between you and your physician/provider. We have found that certain health care needs can be provided without the need for a physical exam.  This service lets us provide the care you need with a short phone conversation.  If a prescription is necessary we can send it directly to your pharmacy.  If lab work is needed we can place an order for that and you can then stop by our lab to have the test done at a later time.    Telephone visits are billed at different rates depending on your insurance coverage. During this emergency period, for some insurers they may be billed the same as an in-person visit.  Please reach out to your insurance provider with any questions.    If during the course of the call the physician/provider feels a telephone visit is not appropriate, you will not be charged for this service.\"    Patient has given verbal consent for Telephone visit?  Yes    What phone number would you like to be contacted at? [phone]    How would you like to obtain your AVS? Mail a copy    Subjective     Katherin Jonas is a 79 year old female who presents via phone visit today for the following health issues:    HPI    Katherin is requesting a refill of her Xarelto    She recently found out that her cardiologist has left the practice.  She is taking Xarelto for A. fib.  She has been on it since 2018 and has been doing well.  She has no concerns with its use.  She typically sees Dr. Matthews.  She was recently seen by Dr. Pugh         Review of Systems   CONSTITUTIONAL: NEGATIVE for fever, chills, change in weight  ENT/MOUTH: NEGATIVE for ear, mouth and throat problems  RESP: NEGATIVE for significant cough or SOB  CV: NEGATIVE for chest pain, palpitations or peripheral edema       Objective          Vitals:  No " vitals were obtained today due to virtual visit.    healthy, alert and no distress  PSYCH: Alert and oriented times 3; coherent speech, normal   rate and volume, able to articulate logical thoughts, able   to abstract reason, no tangential thoughts, no hallucinations   or delusions  Her affect is normal and pleasant  RESP: No cough, no audible wheezing, able to talk in full sentences  Remainder of exam unable to be completed due to telephone visits    No results found for any visits on 09/08/20.        Assessment/Plan:    Assessment & Plan     Paroxysmal atrial fibrillation (H)  Will refill Xarelto for 6 months, during this time she will reestablish care with a new cardiologist or get further refills from her PCP.  - XARELTO ANTICOAGULANT 15 MG TABS tablet; Take 1 tablet (15 mg) by mouth daily           Return in about 6 months (around 3/8/2021), or if symptoms worsen or fail to improve with cardio.    Ariane Hammond PA-C  [facility]    Phone call duration:  4 minutes                 23-Nov-2019 02:04

## 2020-09-18 DIAGNOSIS — G89.29 OTHER CHRONIC PAIN: ICD-10-CM

## 2020-09-21 RX ORDER — OXYCODONE HYDROCHLORIDE 5 MG/1
TABLET ORAL
Qty: 120 TABLET | Refills: 0 | Status: SHIPPED | OUTPATIENT
Start: 2020-09-21 | End: 2020-10-27

## 2020-09-21 NOTE — TELEPHONE ENCOUNTER
Pending Prescriptions:                       Disp   Refills    oxyCODONE (ROXICODONE) 5 MG tablet [Pharma*120 ta*0        Sig: TAKE ONE TABLET BY MOUTH EVERY 6 HOURS AS NEEDED FOR           SEVERE PAIN **START DATE:08/17/2020**        Routing refill request to provider for review/approval because:  Drug not on the [facility] refill protocol   Last Seen: 09/08/20  Last Refill: 08/17/20  Jake Maldonado RN  September 21, 2020
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
## 2020-10-05 ENCOUNTER — OFFICE VISIT (OUTPATIENT)
Dept: ENDOCRINOLOGY | Facility: CLINIC | Age: 80
End: 2020-10-05
Payer: COMMERCIAL

## 2020-10-05 VITALS — SYSTOLIC BLOOD PRESSURE: 107 MMHG | HEART RATE: 86 BPM | OXYGEN SATURATION: 98 % | DIASTOLIC BLOOD PRESSURE: 63 MMHG

## 2020-10-05 DIAGNOSIS — N18.31 STAGE 3A CHRONIC KIDNEY DISEASE (H): ICD-10-CM

## 2020-10-05 DIAGNOSIS — M81.0 AGE-RELATED OSTEOPOROSIS WITHOUT CURRENT PATHOLOGICAL FRACTURE: Primary | ICD-10-CM

## 2020-10-05 PROCEDURE — 99214 OFFICE O/P EST MOD 30 MIN: CPT | Performed by: INTERNAL MEDICINE

## 2020-10-05 NOTE — PATIENT INSTRUCTIONS
- please schedule Prolia injection today and April 2021, October 2021  - please schedule DXA scan at [facility] after June. She can be scheduled for October 2021.  - Return visit in October 2021    If you have any questions, please do not hesitate to call [facility] at [phone] and ask for Endocrinology clinic.    Sincerely,    Cecelia Galeano MD  Endocrinology
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
## 2020-10-05 NOTE — NURSING NOTE
Katherin Jonas's goals for this visit include:   Chief Complaint   Patient presents with     Osteoporosis     She requests these members of her care team be copied on today's visit information: Yes    PCP: Silvia Matthews    Referring Provider:  Silvia Matthews MD  [address] [place] [address] [address]  [place] [address]    /63 (BP Location: Right arm, Patient Position: Sitting, Cuff Size: Adult Small)   Pulse 86   LMP 02/01/2011   SpO2 98%     Do you need any medication refills at today's visit? No

## 2020-10-16 ENCOUNTER — INFUSION THERAPY VISIT (OUTPATIENT)
Dept: INFUSION THERAPY | Facility: CLINIC | Age: 80
End: 2020-10-16
Payer: COMMERCIAL

## 2020-10-16 VITALS
OXYGEN SATURATION: 99 % | HEART RATE: 74 BPM | SYSTOLIC BLOOD PRESSURE: 169 MMHG | RESPIRATION RATE: 16 BRPM | DIASTOLIC BLOOD PRESSURE: 76 MMHG | TEMPERATURE: 97.7 F | BODY MASS INDEX: 22.75 KG/M2 | WEIGHT: 107 LBS

## 2020-10-16 DIAGNOSIS — M81.0 AGE-RELATED OSTEOPOROSIS WITHOUT CURRENT PATHOLOGICAL FRACTURE: Primary | ICD-10-CM

## 2020-10-16 DIAGNOSIS — N18.31 STAGE 3A CHRONIC KIDNEY DISEASE (H): ICD-10-CM

## 2020-10-16 LAB
ALBUMIN SERPL-MCNC: 3.4 G/DL (ref 3.4–5)
CALCIUM SERPL-MCNC: 9 MG/DL (ref 8.5–10.1)
CREAT SERPL-MCNC: 0.8 MG/DL (ref 0.52–1.04)
GFR SERPL CREATININE-BSD FRML MDRD: 70 ML/MIN/{1.73_M2}
PHOSPHATE SERPL-MCNC: 3.6 MG/DL (ref 2.5–4.5)

## 2020-10-16 PROCEDURE — 82565 ASSAY OF CREATININE: CPT | Performed by: INTERNAL MEDICINE

## 2020-10-16 PROCEDURE — 82306 VITAMIN D 25 HYDROXY: CPT | Performed by: INTERNAL MEDICINE

## 2020-10-16 PROCEDURE — 82040 ASSAY OF SERUM ALBUMIN: CPT | Performed by: INTERNAL MEDICINE

## 2020-10-16 PROCEDURE — 36415 COLL VENOUS BLD VENIPUNCTURE: CPT | Performed by: INTERNAL MEDICINE

## 2020-10-16 PROCEDURE — 84100 ASSAY OF PHOSPHORUS: CPT | Performed by: INTERNAL MEDICINE

## 2020-10-16 PROCEDURE — 96372 THER/PROPH/DIAG INJ SC/IM: CPT | Performed by: INTERNAL MEDICINE

## 2020-10-16 PROCEDURE — 82310 ASSAY OF CALCIUM: CPT | Performed by: INTERNAL MEDICINE

## 2020-10-16 PROCEDURE — 99207 PR NO CHARGE NURSE ONLY: CPT

## 2020-10-16 ASSESSMENT — PAIN SCALES - GENERAL: PAINLEVEL: SEVERE PAIN (6)

## 2020-10-16 NOTE — PROGRESS NOTES
Infusion Nursing Note:  Katherin Jonas presents today for Prolia.    Patient seen by provider today: No   present during visit today: Not Applicable.    Note: N/A.    Intravenous Access:  No Intravenous access at this visit.    Treatment Conditions:  Lab Results   Component Value Date     08/07/2020                   Lab Results   Component Value Date    POTASSIUM 4.3 08/07/2020           Lab Results   Component Value Date    MAG 2.1 07/29/2013            Lab Results   Component Value Date    CR 0.80 10/16/2020                   Lab Results   Component Value Date    LEIDA 9.0 10/16/2020                Lab Results   Component Value Date    BILITOTAL 0.5 11/22/2019           Lab Results   Component Value Date    ALBUMIN 3.4 10/16/2020                    Lab Results   Component Value Date    ALT 49 11/22/2019           Lab Results   Component Value Date    AST 39 11/22/2019       Results reviewed, labs MET treatment parameters, ok to proceed with treatment.    Post Infusion Assessment:  Patient tolerated injection without incident.  Site patent and intact, free from redness, edema or discomfort.     Discharge Plan:   Patient discharged in stable condition accompanied by: .  Departure Mode: Ambulatory.    Dayanara Wolfe RN

## 2020-10-17 LAB — DEPRECATED CALCIDIOL+CALCIFEROL SERPL-MC: 32 UG/L (ref 20–75)

## 2020-10-19 ENCOUNTER — TELEPHONE (OUTPATIENT)
Dept: FAMILY MEDICINE | Facility: OTHER | Age: 80
End: 2020-10-19
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
## 2020-10-23 DIAGNOSIS — G89.29 OTHER CHRONIC PAIN: ICD-10-CM

## 2020-10-24 NOTE — TELEPHONE ENCOUNTER
Pending Prescriptions:                       Disp   Refills    oxyCODONE (ROXICODONE) 5 MG tablet [Pharma*120 ta*0        Sig: TAKE ONE TABLET BY MOUTH EVERY 6 HOURS AS NEEDED FOR           SEVERE PAIN    Last Written Prescription Date:  09/21/2020  Last Fill Quantity: 120,   # refills: 0  Last Office Visit: 09/08/2020  Future Office visit:         Routing refill request to provider for review/approval because:  Drug not on the [facility] refill protocol     Matilda Menezes RN

## 2020-10-27 RX ORDER — OXYCODONE HYDROCHLORIDE 5 MG/1
TABLET ORAL
Qty: 120 TABLET | Refills: 0 | Status: SHIPPED | OUTPATIENT
Start: 2020-10-27 | End: 2020-11-24

## 2020-11-24 DIAGNOSIS — G89.29 OTHER CHRONIC PAIN: ICD-10-CM

## 2020-11-24 RX ORDER — OXYCODONE HYDROCHLORIDE 5 MG/1
TABLET ORAL
Qty: 120 TABLET | Refills: 0 | Status: SHIPPED | OUTPATIENT
Start: 2020-11-26 | End: 2020-12-31

## 2020-11-24 NOTE — TELEPHONE ENCOUNTER
Pending Prescriptions:                       Disp   Refills    oxyCODONE (ROXICODONE) 5 MG tablet [Pharma*120 ta*0        Sig: TAKE ONE TABLET BY MOUTH EVERY 6 HOURS AS NEEDED FOR           SEVERE PAIN    Routing refill request to provider for review/approval because:  Drug not on the [facility] refill protocol

## 2020-12-08 DIAGNOSIS — E03.9 HYPOTHYROIDISM, UNSPECIFIED TYPE: ICD-10-CM

## 2020-12-08 RX ORDER — LEVOTHYROXINE SODIUM 50 UG/1
TABLET ORAL
Qty: 30 TABLET | Refills: 0 | Status: SHIPPED | OUTPATIENT
Start: 2020-12-08 | End: 2021-01-05

## 2020-12-31 DIAGNOSIS — G89.29 OTHER CHRONIC PAIN: ICD-10-CM

## 2020-12-31 RX ORDER — OXYCODONE HYDROCHLORIDE 5 MG/1
TABLET ORAL
Qty: 120 TABLET | Refills: 0 | Status: SHIPPED | OUTPATIENT
Start: 2020-12-31 | End: 2021-01-22

## 2020-12-31 NOTE — TELEPHONE ENCOUNTER
Routing refill request to provider for review/approval because:  Drug not on the [facility] refill protocol   Clarissa Coronel RN

## 2021-01-01 ENCOUNTER — OFFICE VISIT (OUTPATIENT)
Dept: ORTHOPEDICS | Facility: CLINIC | Age: 81
End: 2021-01-01
Payer: COMMERCIAL

## 2021-01-01 ENCOUNTER — TELEPHONE (OUTPATIENT)
Dept: FAMILY MEDICINE | Facility: OTHER | Age: 81
End: 2021-01-01

## 2021-01-01 ENCOUNTER — NURSE TRIAGE (OUTPATIENT)
Dept: NURSING | Facility: CLINIC | Age: 81
End: 2021-01-01

## 2021-01-01 ENCOUNTER — MEDICAL CORRESPONDENCE (OUTPATIENT)
Dept: HEALTH INFORMATION MANAGEMENT | Facility: CLINIC | Age: 81
End: 2021-01-01
Payer: COMMERCIAL

## 2021-01-01 ENCOUNTER — MEDICAL CORRESPONDENCE (OUTPATIENT)
Dept: HEALTH INFORMATION MANAGEMENT | Facility: CLINIC | Age: 81
End: 2021-01-01

## 2021-01-01 ENCOUNTER — OFFICE VISIT (OUTPATIENT)
Dept: FAMILY MEDICINE | Facility: OTHER | Age: 81
End: 2021-01-01
Payer: COMMERCIAL

## 2021-01-01 ENCOUNTER — TRANSFERRED RECORDS (OUTPATIENT)
Dept: HEALTH INFORMATION MANAGEMENT | Facility: CLINIC | Age: 81
End: 2021-01-01
Payer: COMMERCIAL

## 2021-01-01 ENCOUNTER — TELEPHONE (OUTPATIENT)
Dept: FAMILY MEDICINE | Facility: OTHER | Age: 81
End: 2021-01-01
Payer: COMMERCIAL

## 2021-01-01 ENCOUNTER — TELEPHONE (OUTPATIENT)
Dept: NURSING | Facility: CLINIC | Age: 81
End: 2021-01-01
Payer: COMMERCIAL

## 2021-01-01 ENCOUNTER — NURSE TRIAGE (OUTPATIENT)
Dept: NURSING | Facility: CLINIC | Age: 81
End: 2021-01-01
Payer: COMMERCIAL

## 2021-01-01 ENCOUNTER — HEALTH MAINTENANCE LETTER (OUTPATIENT)
Age: 81
End: 2021-01-01

## 2021-01-01 ENCOUNTER — VIRTUAL VISIT (OUTPATIENT)
Dept: FAMILY MEDICINE | Facility: OTHER | Age: 81
End: 2021-01-01
Payer: COMMERCIAL

## 2021-01-01 ENCOUNTER — INFUSION THERAPY VISIT (OUTPATIENT)
Dept: INFUSION THERAPY | Facility: CLINIC | Age: 81
End: 2021-01-01
Payer: COMMERCIAL

## 2021-01-01 ENCOUNTER — TRANSFERRED RECORDS (OUTPATIENT)
Dept: HEALTH INFORMATION MANAGEMENT | Facility: CLINIC | Age: 81
End: 2021-01-01

## 2021-01-01 ENCOUNTER — ANCILLARY PROCEDURE (OUTPATIENT)
Dept: GENERAL RADIOLOGY | Facility: CLINIC | Age: 81
End: 2021-01-01
Attending: FAMILY MEDICINE
Payer: COMMERCIAL

## 2021-01-01 ENCOUNTER — TELEPHONE (OUTPATIENT)
Dept: ENDOCRINOLOGY | Facility: CLINIC | Age: 81
End: 2021-01-01

## 2021-01-01 VITALS
RESPIRATION RATE: 14 BRPM | TEMPERATURE: 97 F | SYSTOLIC BLOOD PRESSURE: 160 MMHG | DIASTOLIC BLOOD PRESSURE: 90 MMHG | HEART RATE: 86 BPM | OXYGEN SATURATION: 97 %

## 2021-01-01 VITALS
WEIGHT: 104.9 LBS | OXYGEN SATURATION: 97 % | DIASTOLIC BLOOD PRESSURE: 84 MMHG | BODY MASS INDEX: 22.11 KG/M2 | HEART RATE: 85 BPM | SYSTOLIC BLOOD PRESSURE: 158 MMHG

## 2021-01-01 VITALS
SYSTOLIC BLOOD PRESSURE: 130 MMHG | OXYGEN SATURATION: 95 % | HEART RATE: 100 BPM | WEIGHT: 102.5 LBS | BODY MASS INDEX: 21.6 KG/M2 | TEMPERATURE: 98.1 F | RESPIRATION RATE: 16 BRPM | DIASTOLIC BLOOD PRESSURE: 62 MMHG

## 2021-01-01 VITALS
SYSTOLIC BLOOD PRESSURE: 134 MMHG | DIASTOLIC BLOOD PRESSURE: 72 MMHG | RESPIRATION RATE: 16 BRPM | TEMPERATURE: 98.8 F | HEART RATE: 71 BPM | OXYGEN SATURATION: 97 %

## 2021-01-01 DIAGNOSIS — I48.0 PAROXYSMAL ATRIAL FIBRILLATION (H): ICD-10-CM

## 2021-01-01 DIAGNOSIS — M81.0 AGE-RELATED OSTEOPOROSIS WITHOUT CURRENT PATHOLOGICAL FRACTURE: Primary | ICD-10-CM

## 2021-01-01 DIAGNOSIS — E78.5 HYPERLIPIDEMIA, UNSPECIFIED HYPERLIPIDEMIA TYPE: ICD-10-CM

## 2021-01-01 DIAGNOSIS — R11.2 NAUSEA AND VOMITING, INTRACTABILITY OF VOMITING NOT SPECIFIED, UNSPECIFIED VOMITING TYPE: Primary | ICD-10-CM

## 2021-01-01 DIAGNOSIS — M19.011 OSTEOARTHRITIS OF BOTH GLENOHUMERAL JOINTS: Primary | ICD-10-CM

## 2021-01-01 DIAGNOSIS — G47.00 INSOMNIA, UNSPECIFIED TYPE: ICD-10-CM

## 2021-01-01 DIAGNOSIS — I50.9 CHRONIC CONGESTIVE HEART FAILURE, UNSPECIFIED HEART FAILURE TYPE (H): ICD-10-CM

## 2021-01-01 DIAGNOSIS — F32.0 MILD MAJOR DEPRESSION (H): ICD-10-CM

## 2021-01-01 DIAGNOSIS — G89.29 OTHER CHRONIC PAIN: Primary | ICD-10-CM

## 2021-01-01 DIAGNOSIS — N18.31 STAGE 3A CHRONIC KIDNEY DISEASE (H): ICD-10-CM

## 2021-01-01 DIAGNOSIS — Z00.00 ENCOUNTER FOR MEDICARE ANNUAL WELLNESS EXAM: Primary | ICD-10-CM

## 2021-01-01 DIAGNOSIS — R41.89 COGNITIVE IMPAIRMENT: ICD-10-CM

## 2021-01-01 DIAGNOSIS — M79.621 PAIN IN BOTH UPPER ARMS: ICD-10-CM

## 2021-01-01 DIAGNOSIS — I36.1 NONRHEUMATIC TRICUSPID VALVE REGURGITATION: ICD-10-CM

## 2021-01-01 DIAGNOSIS — I34.0 NONRHEUMATIC MITRAL VALVE REGURGITATION: ICD-10-CM

## 2021-01-01 DIAGNOSIS — G89.29 OTHER CHRONIC PAIN: ICD-10-CM

## 2021-01-01 DIAGNOSIS — N30.00 ACUTE CYSTITIS WITHOUT HEMATURIA: Primary | ICD-10-CM

## 2021-01-01 DIAGNOSIS — N30.01 ACUTE CYSTITIS WITH HEMATURIA: Primary | ICD-10-CM

## 2021-01-01 DIAGNOSIS — M19.012 OSTEOARTHRITIS OF BOTH GLENOHUMERAL JOINTS: Primary | ICD-10-CM

## 2021-01-01 DIAGNOSIS — I07.9 ENDOCARDITIS OF TRICUSPID VALVE: Primary | ICD-10-CM

## 2021-01-01 DIAGNOSIS — M41.9 SCOLIOSIS, UNSPECIFIED SCOLIOSIS TYPE, UNSPECIFIED SPINAL REGION: ICD-10-CM

## 2021-01-01 DIAGNOSIS — E03.9 HYPOTHYROIDISM, UNSPECIFIED TYPE: ICD-10-CM

## 2021-01-01 DIAGNOSIS — L30.4 INTERTRIGO: ICD-10-CM

## 2021-01-01 DIAGNOSIS — N30.00 ACUTE CYSTITIS WITHOUT HEMATURIA: ICD-10-CM

## 2021-01-01 DIAGNOSIS — M79.622 PAIN IN BOTH UPPER ARMS: ICD-10-CM

## 2021-01-01 DIAGNOSIS — M15.0 PRIMARY OSTEOARTHRITIS INVOLVING MULTIPLE JOINTS: ICD-10-CM

## 2021-01-01 DIAGNOSIS — K59.00 CONSTIPATION, UNSPECIFIED CONSTIPATION TYPE: ICD-10-CM

## 2021-01-01 DIAGNOSIS — R31.29 OTHER MICROSCOPIC HEMATURIA: ICD-10-CM

## 2021-01-01 DIAGNOSIS — D47.2 MGUS (MONOCLONAL GAMMOPATHY OF UNKNOWN SIGNIFICANCE): ICD-10-CM

## 2021-01-01 DIAGNOSIS — L85.3 DRY SKIN: ICD-10-CM

## 2021-01-01 DIAGNOSIS — R82.998 RED-COLORED URINE: Primary | ICD-10-CM

## 2021-01-01 DIAGNOSIS — N18.30 STAGE 3 CHRONIC KIDNEY DISEASE, UNSPECIFIED WHETHER STAGE 3A OR 3B CKD (H): ICD-10-CM

## 2021-01-01 DIAGNOSIS — R31.21 ASYMPTOMATIC MICROSCOPIC HEMATURIA: ICD-10-CM

## 2021-01-01 LAB
ALBUMIN SERPL-MCNC: 3.6 G/DL (ref 3.4–5)
ALBUMIN UR-MCNC: 30 MG/DL
ALP SERPL-CCNC: 39 U/L (ref 40–150)
ALT SERPL W P-5'-P-CCNC: 15 U/L (ref 0–50)
AMPHETAMINES UR QL: NOT DETECTED NG/ML
ANION GAP SERPL CALCULATED.3IONS-SCNC: 3 MMOL/L (ref 3–14)
APPEARANCE UR: CLEAR
AST SERPL W P-5'-P-CCNC: 12 U/L (ref 0–45)
BACTERIA #/AREA URNS HPF: ABNORMAL /HPF
BACTERIA SPEC CULT: NO GROWTH
BARBITURATES UR QL SCN: NOT DETECTED NG/ML
BENZODIAZ UR QL SCN: ABNORMAL NG/ML
BILIRUB SERPL-MCNC: 0.3 MG/DL (ref 0.2–1.3)
BILIRUB UR QL STRIP: NEGATIVE
BUN SERPL-MCNC: 14 MG/DL (ref 7–30)
BUPRENORPHINE UR QL: NOT DETECTED NG/ML
CALCIUM SERPL-MCNC: 8.5 MG/DL (ref 8.5–10.1)
CALCIUM SERPL-MCNC: 9.3 MG/DL (ref 8.5–10.1)
CANNABINOIDS UR QL: NOT DETECTED NG/ML
CHLORIDE SERPL-SCNC: 106 MMOL/L (ref 94–109)
CHOLEST SERPL-MCNC: 152 MG/DL
CO2 SERPL-SCNC: 33 MMOL/L (ref 20–32)
COCAINE UR QL SCN: NOT DETECTED NG/ML
COLOR UR AUTO: YELLOW
CREAT SERPL-MCNC: 0.67 MG/DL (ref 0.52–1.04)
CREAT SERPL-MCNC: 0.76 MG/DL (ref 0.52–1.04)
CREAT UR-MCNC: 77 MG/DL
D-METHAMPHET UR QL: NOT DETECTED NG/ML
EJECTION FRACTION: NORMAL %
GFR SERPL CREATININE-BSD FRML MDRD: 74 ML/MIN/{1.73_M2}
GFR SERPL CREATININE-BSD FRML MDRD: 83 ML/MIN/{1.73_M2}
GLUCOSE SERPL-MCNC: 113 MG/DL (ref 70–99)
GLUCOSE UR STRIP-MCNC: NEGATIVE MG/DL
HDLC SERPL-MCNC: 81 MG/DL
HGB UR QL STRIP: ABNORMAL
KETONES UR STRIP-MCNC: NEGATIVE MG/DL
LDLC SERPL CALC-MCNC: 60 MG/DL
LEUKOCYTE ESTERASE UR QL STRIP: NEGATIVE
Lab: NORMAL
METHADONE UR QL SCN: NOT DETECTED NG/ML
MICROALBUMIN UR-MCNC: 102 MG/L
MICROALBUMIN/CREAT UR: 132.64 MG/G CR (ref 0–25)
NITRATE UR QL: NEGATIVE
NONHDLC SERPL-MCNC: 71 MG/DL
OPIATES UR QL SCN: NOT DETECTED NG/ML
OXYCODONE UR QL SCN: ABNORMAL NG/ML
PCP UR QL SCN: NOT DETECTED NG/ML
PH UR STRIP: 7 PH (ref 5–7)
PHOSPHATE SERPL-MCNC: 4.4 MG/DL (ref 2.5–4.5)
POTASSIUM SERPL-SCNC: 3.8 MMOL/L (ref 3.4–5.3)
PROPOXYPH UR QL: NOT DETECTED NG/ML
PROT SERPL-MCNC: 8 G/DL (ref 6.8–8.8)
RBC #/AREA URNS AUTO: >100 /HPF
SODIUM SERPL-SCNC: 142 MMOL/L (ref 133–144)
SOURCE: ABNORMAL
SP GR UR STRIP: 1.02 (ref 1–1.03)
SPECIMEN SOURCE: NORMAL
TRICYCLICS UR QL SCN: NOT DETECTED NG/ML
TRIGL SERPL-MCNC: 53 MG/DL
TSH SERPL DL<=0.005 MIU/L-ACNC: 0.8 MU/L (ref 0.4–4)
UROBILINOGEN UR STRIP-ACNC: 0.2 EU/DL (ref 0.2–1)
WBC #/AREA URNS AUTO: ABNORMAL /HPF

## 2021-01-01 PROCEDURE — 36415 COLL VENOUS BLD VENIPUNCTURE: CPT | Performed by: FAMILY MEDICINE

## 2021-01-01 PROCEDURE — 99207 PR NO CHARGE NURSE ONLY: CPT

## 2021-01-01 PROCEDURE — 80061 LIPID PANEL: CPT | Performed by: FAMILY MEDICINE

## 2021-01-01 PROCEDURE — 99204 OFFICE O/P NEW MOD 45 MIN: CPT | Performed by: FAMILY MEDICINE

## 2021-01-01 PROCEDURE — 73030 X-RAY EXAM OF SHOULDER: CPT | Mod: RT | Performed by: RADIOLOGY

## 2021-01-01 PROCEDURE — 80053 COMPREHEN METABOLIC PANEL: CPT | Performed by: FAMILY MEDICINE

## 2021-01-01 PROCEDURE — 84100 ASSAY OF PHOSPHORUS: CPT | Performed by: INTERNAL MEDICINE

## 2021-01-01 PROCEDURE — 82565 ASSAY OF CREATININE: CPT | Performed by: INTERNAL MEDICINE

## 2021-01-01 PROCEDURE — 82043 UR ALBUMIN QUANTITATIVE: CPT | Performed by: FAMILY MEDICINE

## 2021-01-01 PROCEDURE — 20611 DRAIN/INJ JOINT/BURSA W/US: CPT | Performed by: FAMILY MEDICINE

## 2021-01-01 PROCEDURE — 99213 OFFICE O/P EST LOW 20 MIN: CPT | Performed by: FAMILY MEDICINE

## 2021-01-01 PROCEDURE — 99207 PR DROP WITH A PROCEDURE: CPT | Performed by: FAMILY MEDICINE

## 2021-01-01 PROCEDURE — 81001 URINALYSIS AUTO W/SCOPE: CPT | Performed by: FAMILY MEDICINE

## 2021-01-01 PROCEDURE — 99214 OFFICE O/P EST MOD 30 MIN: CPT | Performed by: FAMILY MEDICINE

## 2021-01-01 PROCEDURE — 84443 ASSAY THYROID STIM HORMONE: CPT | Performed by: FAMILY MEDICINE

## 2021-01-01 PROCEDURE — 96372 THER/PROPH/DIAG INJ SC/IM: CPT | Performed by: NURSE PRACTITIONER

## 2021-01-01 PROCEDURE — 82310 ASSAY OF CALCIUM: CPT | Performed by: INTERNAL MEDICINE

## 2021-01-01 PROCEDURE — 99215 OFFICE O/P EST HI 40 MIN: CPT | Mod: 95 | Performed by: FAMILY MEDICINE

## 2021-01-01 PROCEDURE — 80306 DRUG TEST PRSMV INSTRMNT: CPT | Performed by: FAMILY MEDICINE

## 2021-01-01 PROCEDURE — 99397 PER PM REEVAL EST PAT 65+ YR: CPT | Performed by: FAMILY MEDICINE

## 2021-01-01 PROCEDURE — 99442 PR PHYSICIAN TELEPHONE EVALUATION 11-20 MIN: CPT | Mod: 95 | Performed by: FAMILY MEDICINE

## 2021-01-01 PROCEDURE — 87086 URINE CULTURE/COLONY COUNT: CPT | Performed by: FAMILY MEDICINE

## 2021-01-01 PROCEDURE — 36415 COLL VENOUS BLD VENIPUNCTURE: CPT | Performed by: INTERNAL MEDICINE

## 2021-01-01 PROCEDURE — 99213 OFFICE O/P EST LOW 20 MIN: CPT | Mod: 25 | Performed by: FAMILY MEDICINE

## 2021-01-01 RX ORDER — CIPROFLOXACIN 250 MG/1
250 TABLET, FILM COATED ORAL 2 TIMES DAILY
Qty: 6 TABLET | Refills: 0 | Status: SHIPPED | OUTPATIENT
Start: 2021-01-01 | End: 2022-01-01

## 2021-01-01 RX ORDER — MORPHINE SULFATE 15 MG/1
15 TABLET, FILM COATED, EXTENDED RELEASE ORAL EVERY 12 HOURS
COMMUNITY
End: 2021-01-01

## 2021-01-01 RX ORDER — RIVAROXABAN 15 MG/1
TABLET, FILM COATED ORAL
Qty: 90 TABLET | Refills: 1 | Status: SHIPPED | OUTPATIENT
Start: 2021-01-01 | End: 2021-01-01

## 2021-01-01 RX ORDER — NYSTATIN 100000 [USP'U]/G
POWDER TOPICAL
Qty: 120 G | Refills: 11 | Status: SHIPPED | OUTPATIENT
Start: 2021-01-01 | End: 2022-01-01

## 2021-01-01 RX ORDER — METOPROLOL SUCCINATE 100 MG/1
100 TABLET, EXTENDED RELEASE ORAL DAILY
COMMUNITY
Start: 2021-01-01 | End: 2022-01-01

## 2021-01-01 RX ORDER — CHOLECALCIFEROL (VITAMIN D3) 50 MCG
1 TABLET ORAL DAILY
COMMUNITY
End: 2021-01-01

## 2021-01-01 RX ORDER — DILTIAZEM HYDROCHLORIDE 240 MG/1
CAPSULE, EXTENDED RELEASE ORAL DAILY
COMMUNITY

## 2021-01-01 RX ORDER — OXYCODONE HYDROCHLORIDE 5 MG/1
5 TABLET ORAL EVERY 4 HOURS PRN
Qty: 180 TABLET | Refills: 0 | Status: SHIPPED | OUTPATIENT
Start: 2021-01-01 | End: 2021-01-01

## 2021-01-01 RX ORDER — FLUOXETINE 40 MG/1
40 CAPSULE ORAL DAILY
Qty: 90 CAPSULE | Refills: 3 | Status: SHIPPED | OUTPATIENT
Start: 2021-01-01

## 2021-01-01 RX ORDER — SENNA AND DOCUSATE SODIUM 50; 8.6 MG/1; MG/1
1-2 TABLET, FILM COATED ORAL AT BEDTIME
Qty: 60 TABLET | Refills: 1 | Status: SHIPPED | OUTPATIENT
Start: 2021-01-01

## 2021-01-01 RX ORDER — OXYCODONE HYDROCHLORIDE 5 MG/1
TABLET ORAL
Qty: 120 TABLET | Refills: 0 | Status: SHIPPED | OUTPATIENT
Start: 2021-01-01 | End: 2021-01-01

## 2021-01-01 RX ORDER — CIPROFLOXACIN 250 MG/1
250 TABLET, FILM COATED ORAL 2 TIMES DAILY
Qty: 6 TABLET | Refills: 0 | Status: SHIPPED | OUTPATIENT
Start: 2021-01-01 | End: 2021-01-01

## 2021-01-01 RX ORDER — AMMONIUM LACTATE 12 G/100G
LOTION TOPICAL
Qty: 500 G | Refills: 11 | Status: SHIPPED | OUTPATIENT
Start: 2021-01-01

## 2021-01-01 RX ORDER — MORPHINE SULFATE 15 MG/1
TABLET, FILM COATED, EXTENDED RELEASE ORAL
Qty: 60 TABLET | Refills: 0 | Status: SHIPPED | OUTPATIENT
Start: 2021-01-01 | End: 2021-01-01

## 2021-01-01 RX ORDER — OXYCODONE HYDROCHLORIDE 5 MG/1
5 TABLET ORAL EVERY 4 HOURS PRN
Qty: 60 TABLET | Refills: 0 | Status: SHIPPED | OUTPATIENT
Start: 2021-01-01 | End: 2022-01-01

## 2021-01-01 RX ORDER — TRAZODONE HYDROCHLORIDE 50 MG/1
TABLET, FILM COATED ORAL
Qty: 90 TABLET | Refills: 3 | Status: SHIPPED | OUTPATIENT
Start: 2021-01-01 | End: 2021-01-01

## 2021-01-01 RX ORDER — GABAPENTIN 300 MG/1
300 CAPSULE ORAL 2 TIMES DAILY
Qty: 360 CAPSULE | Refills: 3 | COMMUNITY
Start: 2021-01-01 | End: 2022-01-01

## 2021-01-01 RX ORDER — FLUOXETINE 40 MG/1
40 CAPSULE ORAL DAILY
Qty: 90 CAPSULE | Refills: 0 | Status: SHIPPED | OUTPATIENT
Start: 2021-01-01 | End: 2021-01-01

## 2021-01-01 RX ORDER — CEPHALEXIN 500 MG/1
500 CAPSULE ORAL 2 TIMES DAILY
Qty: 14 CAPSULE | Refills: 0 | Status: SHIPPED | OUTPATIENT
Start: 2021-01-01 | End: 2021-01-01

## 2021-01-01 RX ORDER — METHYLPREDNISOLONE ACETATE 40 MG/ML
40 INJECTION, SUSPENSION INTRA-ARTICULAR; INTRALESIONAL; INTRAMUSCULAR; SOFT TISSUE
Status: DISCONTINUED | OUTPATIENT
Start: 2021-01-01 | End: 2021-01-01

## 2021-01-01 RX ORDER — PROCHLORPERAZINE MALEATE 5 MG
5 TABLET ORAL EVERY 8 HOURS PRN
Qty: 30 TABLET | Refills: 1 | Status: SHIPPED | OUTPATIENT
Start: 2021-01-01

## 2021-01-01 RX ORDER — FUROSEMIDE 40 MG
40 TABLET ORAL DAILY
COMMUNITY
End: 2022-01-01

## 2021-01-01 RX ORDER — ACETAMINOPHEN 325 MG/1
325-650 TABLET ORAL EVERY 6 HOURS PRN
COMMUNITY

## 2021-01-01 RX ORDER — TRAZODONE HYDROCHLORIDE 50 MG/1
TABLET, FILM COATED ORAL
Qty: 90 TABLET | Refills: 0 | Status: SHIPPED | OUTPATIENT
Start: 2021-01-01 | End: 2021-01-01

## 2021-01-01 RX ORDER — FLUOXETINE 40 MG/1
CAPSULE ORAL
Qty: 90 CAPSULE | Refills: 0 | OUTPATIENT
Start: 2021-01-01

## 2021-01-01 RX ORDER — MORPHINE SULFATE 15 MG/1
15 TABLET, FILM COATED, EXTENDED RELEASE ORAL EVERY 12 HOURS
Qty: 60 TABLET | Refills: 0 | Status: SHIPPED | OUTPATIENT
Start: 2021-01-01 | End: 2022-01-01

## 2021-01-01 RX ORDER — RIVAROXABAN 15 MG/1
TABLET, FILM COATED ORAL
Qty: 90 TABLET | Refills: 1 | Status: SHIPPED | OUTPATIENT
Start: 2021-01-01

## 2021-01-01 RX ORDER — OXYCODONE HYDROCHLORIDE 5 MG/1
5 TABLET ORAL EVERY 4 HOURS PRN
Qty: 60 TABLET | Refills: 0 | Status: SHIPPED | OUTPATIENT
Start: 2021-01-01 | End: 2021-01-01

## 2021-01-01 RX ADMIN — METHYLPREDNISOLONE ACETATE 40 MG: 40 INJECTION, SUSPENSION INTRA-ARTICULAR; INTRALESIONAL; INTRAMUSCULAR; SOFT TISSUE at 09:57

## 2021-01-01 ASSESSMENT — ANXIETY QUESTIONNAIRES
1. FEELING NERVOUS, ANXIOUS, OR ON EDGE: SEVERAL DAYS
IF YOU CHECKED OFF ANY PROBLEMS ON THIS QUESTIONNAIRE, HOW DIFFICULT HAVE THESE PROBLEMS MADE IT FOR YOU TO DO YOUR WORK, TAKE CARE OF THINGS AT HOME, OR GET ALONG WITH OTHER PEOPLE: NOT DIFFICULT AT ALL
GAD7 TOTAL SCORE: 6
5. BEING SO RESTLESS THAT IT IS HARD TO SIT STILL: NOT AT ALL
6. BECOMING EASILY ANNOYED OR IRRITABLE: MORE THAN HALF THE DAYS
GAD7 TOTAL SCORE: 6
7. FEELING AFRAID AS IF SOMETHING AWFUL MIGHT HAPPEN: NOT AT ALL
2. NOT BEING ABLE TO STOP OR CONTROL WORRYING: NOT AT ALL
3. WORRYING TOO MUCH ABOUT DIFFERENT THINGS: NEARLY EVERY DAY

## 2021-01-01 ASSESSMENT — PAIN SCALES - GENERAL
PAINLEVEL: SEVERE PAIN (6)
PAINLEVEL: EXTREME PAIN (8)
PAINLEVEL: EXTREME PAIN (8)

## 2021-01-01 ASSESSMENT — PATIENT HEALTH QUESTIONNAIRE - PHQ9
5. POOR APPETITE OR OVEREATING: NOT AT ALL
SUM OF ALL RESPONSES TO PHQ QUESTIONS 1-9: 6

## 2021-01-01 ASSESSMENT — ACTIVITIES OF DAILY LIVING (ADL): CURRENT_FUNCTION: NO ASSISTANCE NEEDED

## 2021-01-04 DIAGNOSIS — F32.0 MILD MAJOR DEPRESSION (H): ICD-10-CM

## 2021-01-04 DIAGNOSIS — E03.9 HYPOTHYROIDISM, UNSPECIFIED TYPE: ICD-10-CM

## 2021-01-04 DIAGNOSIS — B02.29 POSTHERPETIC NEURALGIA: ICD-10-CM

## 2021-01-04 DIAGNOSIS — N18.30 STAGE 3 CHRONIC KIDNEY DISEASE, UNSPECIFIED WHETHER STAGE 3A OR 3B CKD (H): Primary | ICD-10-CM

## 2021-01-04 RX ORDER — OXYCODONE HYDROCHLORIDE 5 MG/1
TABLET ORAL
Qty: 120 TABLET | Refills: 0 | OUTPATIENT
Start: 2021-01-04

## 2021-01-05 RX ORDER — GABAPENTIN 300 MG/1
CAPSULE ORAL
Qty: 360 CAPSULE | Refills: 0 | Status: SHIPPED | OUTPATIENT
Start: 2021-01-05 | End: 2021-01-22

## 2021-01-05 RX ORDER — FLUOXETINE 40 MG/1
CAPSULE ORAL
Qty: 90 CAPSULE | Refills: 0 | Status: SHIPPED | OUTPATIENT
Start: 2021-01-05 | End: 2021-01-22

## 2021-01-05 RX ORDER — LEVOTHYROXINE SODIUM 50 UG/1
TABLET ORAL
Qty: 30 TABLET | Refills: 0 | Status: SHIPPED | OUTPATIENT
Start: 2021-01-05 | End: 2021-01-22

## 2021-01-05 NOTE — TELEPHONE ENCOUNTER
Patient is overdue for labs and appointment.  Please schedule for either in office appointment, or she could have labs drawn and then schedule virtual visit.

## 2021-01-20 NOTE — PROGRESS NOTES
Assessment & Plan     Other chronic pain  New pain contract was signed. She is up-to-date on her urine drug screen.  was reviewed and there are no concerns. She has come down on her dose quite a bit over the years and appears stable. Refills are given. We'll follow-up in 3 months.    - oxyCODONE (ROXICODONE) 5 MG tablet; TAKE ONE TABLET BY MOUTH EVERY 6 HOURS AS NEEDED FOR SEVERE PAIN    Stage 3 chronic kidney disease, unspecified whether stage 3a or 3b CKD  Chronic. Labs are drawn. Keep renal function in mind with medication changes.    - CBC with platelets  - Comprehensive metabolic panel (BMP + Alb, Alk Phos, ALT, AST, Total. Bili, TP)    Hypothyroidism, unspecified type  She is due for labs. Refills were also given.    - **TSH with free T4 reflex FUTURE anytime  - levothyroxine (SYNTHROID/LEVOTHROID) 50 MCG tablet; Take 1 tablet (50 mcg) by mouth daily    Mild major depression (H)  Patient admits to feeling more down and her  is with her who agrees. We'll increase her fluoxetine from 40 mg to 60 mg daily. We'll follow-up in 3 months.    - FLUoxetine (PROZAC) 20 MG capsule; Take 3 capsules (60 mg) by mouth daily    Postherpetic neuralgia  Stable on gabapentin. Refills are given.    - gabapentin (NEURONTIN) 300 MG capsule; TAKE ONE CAPSULE BY MOUTH FOUR TIMES A DAY    Insomnia, unspecified type  Discussed why Lunesta is not indicated at her age. She is adamant that this was the only thing that was helping her sleep well without side effects. Discussed that I want to even start Lunesta at 3 mg a day would start her at 1 mg and she tells me she has some of these at home and she will stop the trazodone and start this instead.    - eszopiclone (LUNESTA) 1 MG tablet; Take 1 tablet (1 mg) by mouth At Bedtime    Primary osteoarthritis involving multiple joints  We'll try topical Voltaren for her hands and feet.    - diclofenac (VOLTAREN) 1 % topical gel; Apply 2 g topically 4 times daily as needed for  moderate pain    Chronic obstructive pulmonary disease, unspecified COPD type (H)    She denies respiratory complaints. She is a former smoker. Currently she is doing well without inhalers.    Heart failure with preserved left ventricular function (HFpEF) (H)  She follows closely with cardiology. She does have a pacemaker. She currently has no pedal edema.    Paroxysmal atrial fibrillation (H)  Recently saw cardiology. She is on both diltiazem and metoprolol for rate control. She is on Xarelto for anticoagulation.    Return in about 3 months (around 4/22/2021) for pain follow up.    Silvia Matthews MD  [facility] REGGIE Pandey is a 80 year old who presents to clinic today for the following health issues  accompanied by her spouse:    HPI       Hyperlipidemia Follow-Up      Are you regularly taking any medication or supplement to lower your cholesterol?   Yes- Lipitor    Are you having muscle aches or other side effects that you think could be caused by your cholesterol lowering medication?  No    Hypertension Follow-up      Do you check your blood pressure regularly outside of the clinic? No     Are you following a low salt diet? No    Are your blood pressures ever more than 140 on the top number (systolic) OR more   than 90 on the bottom number (diastolic), for example 140/90? Haven't been checking BP    Hypothyroidism Follow-up      Since last visit, patient describes the following symptoms: Weight stable, no hair loss, no skin changes, no constipation, no loose stools, dry skin and constipation      How many servings of fruits and vegetables do you eat daily?  2-3    On average, how many sweetened beverages do you drink each day (Examples: soda, juice, sweet tea, etc.  Do NOT count diet or artificially sweetened beverages)?   0    How many days per week do you exercise enough to make your heart beat faster? 3 or less    How many minutes a day do you exercise enough to make  "your heart beat faster? 9 or less    How many days per week do you miss taking your medication? 0    Patient feels her chronic pain is stable on oxycodone 4 times a day. This puts her stable at the morphine milligram equivalent of 30 mg a day. In the years past she has been a high as 120 morphine milliequivalents that she has been able to titrate down nicely. She complains of more arthritic pains in her hands and feet and wonders what else she could take.    She feels the trazodone is effective for her sleep but she feels it is giving her nasal congestion and increasing her constipation. She still is rather adamant that she did well on Lunesta 3 mg a day. She has been off of this for quite some time. She feels a she could afford using the generic.    Review of Systems   CONSTITUTIONAL: NEGATIVE for fever, chills, change in weight  RESP: NEGATIVE for significant cough or shortness of breath  CV: NEGATIVE for chest pain, palpitations or peripheral edema      Objective    BP (!) 142/76   Pulse 70   Temp 97.7  F (36.5  C) (Temporal)   Resp 16   Ht 1.467 m (4' 9.76\")   Wt 47.4 kg (104 lb 8 oz)   LMP 02/01/2011   SpO2 98%   BMI 22.03 kg/m    Body mass index is 22.03 kg/m .  Physical Exam   Gen: no apparent distress  NECK: no adenopathy, no asymmetry, no masses  Chest: clear to auscultation without wheeze, rale or rhonchi  Cor: regular rate and rhythm without murmur  Ext: warm and dry without edema  Psych: Alert and oriented times 3; coherent speech, normal   rate and volume, able to articulate logical thoughts, able   to abstract reason, no tangential thoughts, no hallucinations   or delusions  Her affect is flat            "

## 2021-01-22 ENCOUNTER — TELEPHONE (OUTPATIENT)
Dept: FAMILY MEDICINE | Facility: OTHER | Age: 81
End: 2021-01-22

## 2021-01-22 ENCOUNTER — OFFICE VISIT (OUTPATIENT)
Dept: FAMILY MEDICINE | Facility: OTHER | Age: 81
End: 2021-01-22
Payer: COMMERCIAL

## 2021-01-22 VITALS
TEMPERATURE: 97.7 F | RESPIRATION RATE: 16 BRPM | HEIGHT: 58 IN | OXYGEN SATURATION: 98 % | HEART RATE: 70 BPM | WEIGHT: 104.5 LBS | DIASTOLIC BLOOD PRESSURE: 76 MMHG | SYSTOLIC BLOOD PRESSURE: 142 MMHG | BODY MASS INDEX: 21.94 KG/M2

## 2021-01-22 DIAGNOSIS — F32.0 MILD MAJOR DEPRESSION (H): ICD-10-CM

## 2021-01-22 DIAGNOSIS — E03.9 HYPOTHYROIDISM, UNSPECIFIED TYPE: ICD-10-CM

## 2021-01-22 DIAGNOSIS — I48.0 PAROXYSMAL ATRIAL FIBRILLATION (H): ICD-10-CM

## 2021-01-22 DIAGNOSIS — I50.30 HEART FAILURE WITH PRESERVED LEFT VENTRICULAR FUNCTION (HFPEF) (H): ICD-10-CM

## 2021-01-22 DIAGNOSIS — G47.00 INSOMNIA, UNSPECIFIED TYPE: ICD-10-CM

## 2021-01-22 DIAGNOSIS — G89.29 OTHER CHRONIC PAIN: Primary | ICD-10-CM

## 2021-01-22 DIAGNOSIS — N18.30 STAGE 3 CHRONIC KIDNEY DISEASE, UNSPECIFIED WHETHER STAGE 3A OR 3B CKD (H): ICD-10-CM

## 2021-01-22 DIAGNOSIS — B02.29 POSTHERPETIC NEURALGIA: ICD-10-CM

## 2021-01-22 DIAGNOSIS — J44.9 CHRONIC OBSTRUCTIVE PULMONARY DISEASE, UNSPECIFIED COPD TYPE (H): ICD-10-CM

## 2021-01-22 DIAGNOSIS — M15.0 PRIMARY OSTEOARTHRITIS INVOLVING MULTIPLE JOINTS: ICD-10-CM

## 2021-01-22 PROBLEM — D69.6 THROMBOCYTOPENIA (H): Status: RESOLVED | Noted: 2019-09-11 | Resolved: 2021-01-22

## 2021-01-22 LAB
ALBUMIN SERPL-MCNC: 3.6 G/DL (ref 3.4–5)
ALP SERPL-CCNC: 38 U/L (ref 40–150)
ALT SERPL W P-5'-P-CCNC: 15 U/L (ref 0–50)
ANION GAP SERPL CALCULATED.3IONS-SCNC: 4 MMOL/L (ref 3–14)
AST SERPL W P-5'-P-CCNC: 15 U/L (ref 0–45)
BILIRUB SERPL-MCNC: 0.4 MG/DL (ref 0.2–1.3)
BUN SERPL-MCNC: 14 MG/DL (ref 7–30)
CALCIUM SERPL-MCNC: 9 MG/DL (ref 8.5–10.1)
CHLORIDE SERPL-SCNC: 104 MMOL/L (ref 94–109)
CO2 SERPL-SCNC: 32 MMOL/L (ref 20–32)
CREAT SERPL-MCNC: 0.72 MG/DL (ref 0.52–1.04)
ERYTHROCYTE [DISTWIDTH] IN BLOOD BY AUTOMATED COUNT: 13.8 % (ref 10–15)
GFR SERPL CREATININE-BSD FRML MDRD: 78 ML/MIN/{1.73_M2}
GLUCOSE SERPL-MCNC: 109 MG/DL (ref 70–99)
HCT VFR BLD AUTO: 35.6 % (ref 35–47)
HGB BLD-MCNC: 11.5 G/DL (ref 11.7–15.7)
MCH RBC QN AUTO: 31.8 PG (ref 26.5–33)
MCHC RBC AUTO-ENTMCNC: 32.3 G/DL (ref 31.5–36.5)
MCV RBC AUTO: 98 FL (ref 78–100)
PLATELET # BLD AUTO: 160 10E9/L (ref 150–450)
POTASSIUM SERPL-SCNC: 4.3 MMOL/L (ref 3.4–5.3)
PROT SERPL-MCNC: 8 G/DL (ref 6.8–8.8)
RBC # BLD AUTO: 3.62 10E12/L (ref 3.8–5.2)
SODIUM SERPL-SCNC: 140 MMOL/L (ref 133–144)
TSH SERPL DL<=0.005 MIU/L-ACNC: 1.5 MU/L (ref 0.4–4)
WBC # BLD AUTO: 4.2 10E9/L (ref 4–11)

## 2021-01-22 PROCEDURE — 85027 COMPLETE CBC AUTOMATED: CPT | Performed by: FAMILY MEDICINE

## 2021-01-22 PROCEDURE — 80053 COMPREHEN METABOLIC PANEL: CPT | Performed by: FAMILY MEDICINE

## 2021-01-22 PROCEDURE — 36415 COLL VENOUS BLD VENIPUNCTURE: CPT | Performed by: FAMILY MEDICINE

## 2021-01-22 PROCEDURE — 99214 OFFICE O/P EST MOD 30 MIN: CPT | Performed by: FAMILY MEDICINE

## 2021-01-22 PROCEDURE — 84443 ASSAY THYROID STIM HORMONE: CPT | Performed by: FAMILY MEDICINE

## 2021-01-22 RX ORDER — ESZOPICLONE 1 MG/1
1 TABLET, FILM COATED ORAL AT BEDTIME
Qty: 30 TABLET | Refills: 5 | COMMUNITY
Start: 2021-01-22 | End: 2021-01-01

## 2021-01-22 RX ORDER — OXYCODONE HYDROCHLORIDE 5 MG/1
TABLET ORAL
Qty: 120 TABLET | Refills: 0 | Status: SHIPPED | OUTPATIENT
Start: 2021-01-22 | End: 2021-01-01

## 2021-01-22 RX ORDER — DILTIAZEM HYDROCHLORIDE 240 MG/1
240 CAPSULE, EXTENDED RELEASE ORAL
COMMUNITY
Start: 2020-04-28 | End: 2021-01-01

## 2021-01-22 RX ORDER — GABAPENTIN 300 MG/1
CAPSULE ORAL
Qty: 360 CAPSULE | Refills: 3 | Status: SHIPPED | OUTPATIENT
Start: 2021-01-22 | End: 2021-01-01

## 2021-01-22 RX ORDER — LEVOTHYROXINE SODIUM 50 UG/1
50 TABLET ORAL DAILY
Qty: 90 TABLET | Refills: 3 | Status: SHIPPED | OUTPATIENT
Start: 2021-01-22

## 2021-01-22 RX ORDER — METOPROLOL SUCCINATE 25 MG/1
25 TABLET, EXTENDED RELEASE ORAL
COMMUNITY
Start: 2020-12-10 | End: 2021-01-01

## 2021-01-22 ASSESSMENT — MIFFLIN-ST. JEOR: SCORE: 829.89

## 2021-01-22 NOTE — TELEPHONE ENCOUNTER
**Please Do Not Close This Encounter**               ** Until This Has Been Addressed**          PRIOR AUTHORIZATION REQUIED PER INSURANCE       PATIENT:Katherin Jonas YOB: 1940          MEDICATION:Diclofenac Sodium 1%                    SIG:Apply 2 g topically 4 times daily as needed for moderate pain       NDC:66895-488-48      INSURANCE:[facility] [place] [address] [place]       INSURANCE PHONE #:[phone]      ID #:[id number]      INSURANCE REJECT:75 - prior auth required      PHARMACY:[facility]      PHARMACY NPI:[id number]      PHARMACY PHONE #:[phone]                                                          Please let us know when Prior Auth approved/denied, prescriber refusal to complete prior auth or if you would like to change medication/discontinue                                                            Thank you

## 2021-01-22 NOTE — PATIENT INSTRUCTIONS
We are working hard to begin vaccinating more people against COVID-19. Currently, we are only vaccinating Phase 1a workers - healthcare workers who are unable to do their job remotely. Vaccine availability is very limited.      If you are a healthcare worker and you are unable to do your job remotely, please log in to [employer] using this link to see if we have openings and schedule an appointment. At your vaccine appointment, you will be asked to provide proof of employment as a health care worker. If you cannot, you will be turned away.     Vaccine appointments are being added as they become available. Please check your [employer] account frequently for availability.  If you have technical difficulty using [employer], call [phone] for assistance.     You can learn more about the state's phased approach to administering the vaccine, with details on each phase, here.      Phase 1b is the next group that will get vaccinated and includes frontline essential workers and adults 75 years of age and older. When we are able to start vaccinating this group, we will share that information on our website. Check this website to stay up to date on COVID-19 vaccination information.

## 2021-01-22 NOTE — LETTER
[facility]  01/22/21    Patient: Katherin Jonas  YOB: 1940  Medical Record Number: [id number]                                                                  Opioid / Opioid Plus Controlled Substance Agreement    I understand that my care provider has prescribed an opioid (narcotic) controlled substance to help manage my condition(s). I am taking this medicine to help me function or work. I know this is strong medicine, and that it can cause serious side effects. Opioid medicine can be sedating, addicting and may cause a dependency on the drug. They can affect my ability to drive or think, and cause depression. They need to be taken exactly as prescribed. Combining opioids with certain medicines or chemicals (such as cocaine, sedatives and tranquilizers, sleeping pills, meth) can be dangerous or even fatal. Also, if I stop opioids suddenly, I may have severe withdrawal symptoms. Last, I understand that opioids do not work for all types of pain nor for all patients. If not helpful, I may be asked to stop them.        The risks, benefits, and side effects of these medicine(s) were explained to me. I agree that:    1. I will take part in other treatments as advised by my care team. This may be psychiatry or counseling, physical therapy, behavioral therapy, group treatment or a referral to a pain clinic. I will reduce or stop my medicine when my care team tells me to do so.  2. I will take my medicines as prescribed. I will not change the dose or schedule unless my care team tells me to. There will be no refills if I  run out early.   I may be contactedwithout warning and asked to complete a urine drug test or pill count at any time.   3. I will keep all my appointments, and understand this is part of the monitoring of opioids. My care team may require an office visit for EVERY opioid/controlled substance refill. If I miss appointments or don t follow instructions, my care team  may stop my medicine.  4. I will not ask other providers to prescribe controlled substances, and I will not accept controlled substances from other people. If I need another prescribed controlled substance for a new reason, I will tell my care team within 1 business day.  5. I will use one pharmacy to fill all of my controlled substance prescriptions, and it is up to me to make sure that I do not run out of my medicines on weekends or holidays. If my care team is willing to refill my opioid prescription without a visit, I must request refills only during office hours, refills may take up to 3 days to process, and it may take up to 5 to 7 days for my medicine to be mailed and ready at my pharmacy. Prescriptions will not be mailed anywhere except my pharmacy.        [id number]  Rev 12/18         Registration to scan to EHR                             Page 1 of 2               Controlled Substance Agreement [facility]  01/22/21  Patient: Katherin Jonas  YOB: 1940  Medical Record Number: [id number]                                                                  6. I am responsible for my prescriptions. If the medicine/prescription is lost or stolen, it will not be replaced. I also agree not to share controlled substance medicines with anyone.  7. I agree to not use ANY illegal or recreational drugs. This includes marijuana, cocaine, bath salts or other drugs. I agree not to use alcohol unless my care team says I may.          I agree to give urine samples whenever asked. If I don t give a urine sample, the care team may stop my medicine.    8. If I enroll in the [place] Medical Marijuana program, I will tell my care team. I will also sign an agreement to share my medical records with my care team.   9. I will bring in my list of medicines (or my medicine bottles) each time I come to the clinic.   10. I will tell my care team right away if I become pregnant or have a new  medical problem treated outside of my regular clinic.  11. I understand that this medicine can affect my thinking and judgment. It may be unsafe for me to drive, use machinery and do dangerous tasks. I will not do any of these things until I know how the medicine affects me. If my dose changes, I will wait to see how it affects me. I will contact my care team if I have concerns about medicine side effects.    I understand that if I do not follow any of the conditions above, my prescriptions or treatment may be stopped.      I agree that my provider, clinic care team, and pharmacy may work with any city, state or federal law enforcement agency that investigates the misuse, sale, or other diversion of my controlled medicine. I will allow my provider to discuss my care with or share a copy of this agreement with any other treating provider, pharmacy or emergency room where I receive care. I agree to give up (waive) any right of privacy or confidentiality with respect to these consents.     I have read this agreement and have asked questions about anything I did not understand.      ________________________________________________________________________  Patient signature - Date/Time -  Katherin Jonas                                      ________________________________________________________________________  Witness signature                                                            ________________________________________________________________________  Provider signature - Silvia Matthews MD      [id number]  Rev 12/18         Registration to scan to EHR                         Page 2 of 2                   Controlled Substance Agreement Opioid           Page 1 of 2  Opioid Pain Medicines (also known as Narcotics)  What You Need to Know    What are opioids?   Opioids are pain medicines that must be prescribed by a doctor.  They are also known as narcotics.    Examples are:     morphine (MS Contin,  Yara)    oxycodone (Oxycontin)    oxycodone and acetaminophen (Percocet)    hydrocodone and acetaminophen (Vicodin, Norco)     fentanyl patch (Duragesic)     hydromorphone (Dilaudid)     methadone     What do opioids do well?   Opioids are best for short-term pain after a surgery or injury. They also work well for cancer pain. Unlike other pain medicines, they do not cause liver or kidney failure or ulcers. They may help some people with long-lasting (chronic) pain.     What do opioids NOT do well?   Opioids never get rid of pain entirely, and they do not work well for most patients with chronic pain. Opioids do not reduce swelling, one of the causes of pain. They also don t work well for nerve pain.                           For informational purposes only.  Not to replace the advice of your care provider.  Copyright [facility]. All right reserved. [employer] 727585-Bqb 02/18.      Page 2 of 2    Risks and side effects   Talk to your doctor before you start or decide to keep taking one of these medicines. Side effects include:    Lowering your breathing rate enough to cause death    Overdose, including death, especially if taking higher than prescribed doses    Long-term opioid use    Worse depression symptoms; less pleasure in things you usually enjoy    Feeling tired or sluggish    Slower thoughts or cloudy thinking    Being more sensitive to pain over time; pain is harder to control    Trouble sleeping or restless sleep    Changes in hormone levels (for example, less testosterone)    Changes in sex drive or ability to have sex    Constipation    Unsafe driving    Itching and sweating    Feeling dizzy    Nausea, vomiting and dry mouth    What else should I know about opioids?  When someone takes opioids for too long or too often, they become dependent. This means that if you stop or reduce the medicine too quickly, you will have withdrawal symptoms.    Dependence is not the same as addiction.  Addiction is when people keep using a substance that harms their body, their mind or their relations with others. If you have a history of drug or alcohol abuse, taking opioids can cause a relapse.    Over time, opioids don t work as well. Most people will need higher and higher doses. The higher the dose, the more serious the side effects. We don t know the long-term effects of opioids.      Prescribed opioids aren't the best way to manage chronic pain    Other ways to manage pain include:      Ibuprofen or acetaminophen.  You should always try this first.      Treat health problems that may be causing pain.      acupuncture or massage, deep breathing, meditation, visual imagery, aromatherapy.      Use heat or ice at the pain site      Physical therapy and exercise      Stop smoking      See a counselor or therapist                                                  People who have used opioids for a long time may have a lower quality of life, worse depression, higher levels of pain and more visits to doctors.    Never share your opioids with others. Be sure to store opioids in a secure place, locked if possible.Young children can easily swallow them and overdose.     You can overdose on opioids.  Signs of overdose include decrease or loss of consciousness, slowed breathing, trouble waking and blue lips.  If someone is worried about overdose, they should call 911.    If you are at risk for overdose, you may get naloxone (Narcan, a medicine that reverses the effects of opioids.  If you overdose, a friend or family member can give you Narcan while waiting for the ambulance.  They need to know the signs of overdose and how to give Narcan.    While you're taking opioids:    Don't use alcohol or street drugs. Taking them together can cause death.    Don't take any of these medicines unless your doctor says its okay.  Taking these with opioids can cause death.    Benzodiazepines (such as lorazepam         or  diazepam)    Muscle relaxers (such as cyclobenzaprine)    sleeping pills    other opioids    Safe disposal of opioids  Find your area drug take-back program, your pharmacy mail-back program, buy a special disposal bag (such as Deterra) from your pharmacy or flush them down the toilet.  Use the guidelines at:  www.fda.gov/drugs/resourcesforyou

## 2021-01-22 NOTE — TELEPHONE ENCOUNTER
PA Initiation - This request was initiated by the Central Prior Auth Team. If you have any questions, we can be reached at [phone]    Medication: diclofenac (VOLTAREN) 1 % topical gel  Insurance Company: [facility] - Phone [phone] Fax [phone]  Pharmacy Filling the Rx: [facility] - [place], [place] - [address] [place] [address] N, [address]  Filling Pharmacy Phone: [phone]  Filling Pharmacy Fax:    Start Date: 1/22/2021

## 2021-01-26 NOTE — TELEPHONE ENCOUNTER
Prior Authorization Approval    Authorization Effective Date: 1/1/2021  Authorization Expiration Date: 1/22/2022  Medication: diclofenac (VOLTAREN) 1 % topical gel - APPROVED   Approved Dose/Quantity:   Reference #:     Insurance Company: [employer] [place] - Phone [phone] Fax [phone]  Expected CoPay:       CoPay Card Available:      Foundation Assistance Needed:    Which Pharmacy is filling the prescription (Not needed for infusion/clinic administered): [facility] - [place], [place] - [address], [address]  Pharmacy Notified: Yes - via phone   Patient Notified: No - Pharmacy will inform the patient once the medication is ready for pickup

## 2021-03-02 NOTE — TELEPHONE ENCOUNTER
Please discuss with patient.  She was planning on restarting her Lunesta.  Therefore, she shouldn't be on trazodone.  If she never started her Lunesta, then it is OK to continue the trazodone.  I don't want her on both.

## 2021-03-05 NOTE — TELEPHONE ENCOUNTER
Please take Lunesta off her med list. Per TC note below patient ok to be on the Trazodone. I have refilled this.      RANDEE Ramirez CNP  Questions or concerns please feel free to send me a [employer] message or call me  Phone : [phone]      TC Note 03/02/21  Please discuss with patient.  She was planning on restarting her Lunesta.  Therefore, she shouldn't be on trazodone.  If she never started her Lunesta, then it is OK to continue the trazodone.  I don't want her on both.

## 2021-03-05 NOTE — TELEPHONE ENCOUNTER
Patient calling and checking on refill status. Please let pharmacy know if approved  / denied. Thanks    Gianfranco Gill  Pharmacy [facility]  On Behalf of [facility]

## 2021-03-05 NOTE — TELEPHONE ENCOUNTER
Pt stated she is no longer taking Lunesta. Pt would like to take Trazodone. Pt is aware TC is out of clinic today. Wondering if another provider could fill in her absence.

## 2021-04-06 NOTE — TELEPHONE ENCOUNTER
Pending Prescriptions:                       Disp   Refills    FLUoxetine (PROZAC) 40 MG capsule [Pharmac*90 cap*0        Sig: TAKE ONE CAPSULE BY MOUTH ONCE DAILY      Routing refill request to provider for review/approval because:  Drug interaction warning    Vero Allen RN on 4/6/2021 at 7:36 AM

## 2021-04-12 NOTE — TELEPHONE ENCOUNTER
Patient is currently not taking a total of 60mg. She is still doing the 40 mg total as she is wanting to stay at the 40 mg. She does not want 20 mg tablets.         Veronica Leiva, CMA

## 2021-04-15 NOTE — PROGRESS NOTES
Assessment & Plan     Encounter for Medicare annual wellness exam      Other chronic pain  Patient continues to take oxycodone 4 times a day.  She feels this controls her pain along with the use of gabapentin and Tylenol.  She has been weaned down from a high MME of 120 a day several years ago down to MME 30 a day and this has controlled her pain.     - Drug Abuse Screen Panel 13, Urine (Care Map)  - oxyCODONE (ROXICODONE) 5 MG tablet; TAKE ONE TABLET BY MOUTH EVERY 6 HOURS AS NEEDED FOR SEVERE PAIN    Mild major depression (H)  Decided she did not want to increase her fluoxetine to 60 mg daily so she continued on the 40 mg daily.  She feels that getting her Covid shots is helped improved her mood.    - FLUoxetine (PROZAC) 40 MG capsule; Take 1 capsule (40 mg) by mouth daily    Stage 3 chronic kidney disease, unspecified whether stage 3a or 3b CKD  Blood pressures controlled.  She is avoiding NSAIDs.  Will check labs and urine today.    - Comprehensive metabolic panel (BMP + Alb, Alk Phos, ALT, AST, Total. Bili, TP)  - Albumin Random Urine Quantitative with Creat Ratio  - *UA reflex to Microscopic and Culture ([facility] (except [place] and [facility])    Paroxysmal atrial fibrillation (H)  She remains on Xarelto.  Rate is controlled on diltiazem.    Hyperlipidemia, unspecified hyperlipidemia type  She remains on atorvastatin.  Labs are drawn.    - Lipid panel reflex to direct LDL Fasting  - Comprehensive metabolic panel (BMP + Alb, Alk Phos, ALT, AST, Total. Bili, TP)    Hypothyroidism, unspecified type  Recheck TSH today.    - TSH with free T4 reflex    Insomnia, unspecified type  Had retried Lunesta but did not feel was helping.  She has restarted trazodone.  She feels it is helping enough.    - traZODone (DESYREL) 50 MG tablet; TAKE ONE TABLET BY MOUTH EVERY NIGHT AT BEDTIME    Return in about 3 months (around 7/20/2021) for pain follow up.    Silvia Matthews MD  [facility]  "[facility] REGGIE Pandey is a 80 year old who presents for the following health issues     Healthy Habits:    In general, how would you rate your overall health?  Good    Frequency of exercise:  None    Duration of exercise:  Less than 15 minutes    Do you usually eat at least 4 servings of fruit and vegetables a day, include whole grains    & fiber and avoid regularly eating high fat or \"junk\" foods?  No    Taking medications regularly:  Yes    Barriers to taking medications:  None    Medication side effects:  Other (constipation)    Ability to successfully perform activities of daily living:  No assistance needed    Home Safety:  No safety concerns identified    Hearing Impairment:  Difficulty understanding soft or whispered speech    In the past 6 months, have you been bothered by leaking of urine? Yes    In general, how would you rate your overall mental or emotional health?  Good      PHQ-2 Total Score:    Additional concerns today:  No     Annual Wellness Visit    Patient has been advised of split billing requirements and indicates understanding: Yes     Are you in the first 12 months of your Medicare Part B coverage?  No    Do you feel safe in your environment? Yes    Have you ever done Advance Care Planning? (For example, a Health Directive, POLST, or a discussion with a medical provider or your loved ones about your wishes)? No, advance care planning information given to patient to review.  Patient declined advance care planning discussion at this time.    Fall risk:  Fallen 2 or more times in the past year?: No  Any fall with injury in the past year?: No    Cognitive Screenin) Repeat 3 items (Leader, Season, Table)    2) Clock draw: NORMAL  3) 3 item recall: Recalls 3 objects  Results: 3 items recalled: COGNITIVE IMPAIRMENT LESS LIKELY    Mini-CogTM Copyright JONEL Busby. Licensed by the author for use in [facility]; reprinted with permission ([email or web address]). All rights " reserved.      Current providers sharing in care for this patient include:   Patient Care Team:  Silvia Matthews MD as PCP - General (Family Practice)  Silvia Matthews MD as Assigned PCP  Cecelia Galeano MD as Assigned Endocrinology Provider    Patient has been advised of split billing requirements and indicates understanding: Yes      Hypertension Follow-up      Do you check your blood pressure regularly outside of the clinic? No     Are you following a low salt diet? No    Are your blood pressures ever more than 140 on the top number (systolic) OR more   than 90 on the bottom number (diastolic), for example 140/90? No    Depression and Anxiety Follow-Up    How are you doing with your depression since your last visit? Improved     How are you doing with your anxiety since your last visit?  Improved     Are you having other symptoms that might be associated with depression or anxiety? No    Have you had a significant life event? OTHER: got covid vaccines which makes her feel much better     Do you have any concerns with your use of alcohol or other drugs? No    Social History     Tobacco Use     Smoking status: Former Smoker     Quit date: 1979     Years since quittin.3     Smokeless tobacco: Never Used     Tobacco comment: no smokers in household   Substance Use Topics     Alcohol use: No     Comment: none since      Drug use: No     PHQ 2020   PHQ-9 Total Score 10 1 6   Q9: Thoughts of better off dead/self-harm past 2 weeks Not at all Not at all Not at all     RACHID-7 SCORE 2018   Total Score - - -   Total Score 5 4 6         Suicide Assessment Five-step Evaluation and Treatment (SAFE-T)    Hypothyroidism Follow-up      Since last visit, patient describes the following symptoms: Weight stable, no hair loss, no skin changes, no constipation, no loose stools    Chronic Pain Follow-Up    Where in your body do you have pain? Left leg    How has your pain affected your ability to work? Not currently working - unrelated to pain  Which of these pain treatments have you tried since your last clinic visit? Other: none  How well are you sleeping? Good  How has your mood been since your last visit? Better  Have you had a significant life event? No  Other aggravating factors: sedentary lifestyle  Taking medication as directed? Yes    PHQ-9 SCORE 1/24/2020 8/6/2020 4/20/2021   PHQ-9 Total Score - - -   PHQ-9 Total Score MyChart - - -   PHQ-9 Total Score 10 1 6     RACHID-7 SCORE 12/7/2018 8/20/2019 4/20/2021   Total Score - - -   Total Score 5 4 6     No flowsheet data found.  Encounter-Level CSA - 05/18/2016:    Controlled Substance Agreement - Scan on 5/31/2016  2:21 PM: CONTROLLED SUBSTANCE AGREEMENT     Patient-Level CSA:    Controlled Substance Agreement - Opioid - Scan on 2/6/2021  7:49 PM           Objective    Pulse 100   Temp 98.1  F (36.7  C) (Temporal)   Resp 16   Wt 46.5 kg (102 lb 8 oz)   LMP 02/01/2011   SpO2 95%   BMI 21.60 kg/m    Body mass index is 21.6 kg/m .  Physical Exam   Gen: no apparent distress  NECK: no adenopathy, no asymmetry, no masses  Chest: clear to auscultation without wheeze, rale or rhonchi  Cor: regular rate and rhythm without murmur  ABDOMEN: soft, nontender, no masses and bowel sounds normal  Ext: warm and dry without edema  Psych: Alert and oriented times 3; coherent speech, normal   rate and volume, able to articulate logical thoughts, able   to abstract reason, no tangential thoughts, no hallucinations   or delusions  Her affect is neutral            She is at risk for lack of exercise and has been provided with information to increase physical activity for the benefit of her well-being.  The patient was counseled and encouraged to consider modifying their diet and eating habits. She was provided with information on recommended healthy diet options.  The patient was provided with written information regarding  signs of hearing loss.  Information on urinary incontinence and treatment options given to patient.  The patient's PHQ-9 score is consistent with mild depression. She was provided with information regarding depression and was advised to schedule a follow up appointment in 12 weeks to further address this issue.

## 2021-04-16 NOTE — TELEPHONE ENCOUNTER
----- Message from Cecelia Galeano MD sent at 4/16/2021  3:12 PM CDT -----  Regarding: RE: Prolia injection  Please see message below. Can you please call patient to get Prolia injection?     Thanks,  Cecelia  ----- Message -----  From: Elisabeth Pop CMA  Sent: 4/16/2021   8:49 AM CDT  To: Cecelia Galeano MD  Subject: Prolia injection                                 Hi Dr. Cecelia Galeano,     FYI    Patient was scheduled for lab draw and prolia injection today.  Patient left after lab appointment without getting the Prolia injection.  I called the patient to advise Prolia injection was supposed to be done today, patient became agitated stating she only had a lab appointment today not an injection.      Sincerely    Elisabeth Pop CMA

## 2021-04-16 NOTE — TELEPHONE ENCOUNTER
Writer contacted patient to review. She was under impression lab work was for primary and didn't realize injection was due.    Provided patient with direct number to infusion scheduling. She plans to contact them today or Monday to reschedule.    Mercy Bland LPN  Adult Endocrinology   [facility]

## 2021-04-19 NOTE — PROGRESS NOTES
Infusion Nursing Note:  Katherin Jonas presents today for   Chief Complaint   Patient presents with     Allied Health Visit     Adela       Patient seen by provider today: No   present during visit today: Not Applicable.    Note: Patient was assessed by Dunia Madsen RN prior to injection.    Intravenous Access:  No Intravenous access/labs at this visit.    Treatment Conditions:  Lab Results   Component Value Date    HGB 11.5 01/22/2021     Lab Results   Component Value Date    WBC 4.2 01/22/2021      Lab Results   Component Value Date    ANEU 3.6 08/12/2020     Lab Results   Component Value Date     01/22/2021      Lab Results   Component Value Date     01/22/2021                   Lab Results   Component Value Date    POTASSIUM 4.3 01/22/2021           Lab Results   Component Value Date    MAG 2.1 07/29/2013            Lab Results   Component Value Date    CR 0.76 04/16/2021                   Lab Results   Component Value Date    LEIDA 9.3 04/16/2021                Lab Results   Component Value Date    BILITOTAL 0.4 01/22/2021           Lab Results   Component Value Date    ALBUMIN 3.6 01/22/2021                    Lab Results   Component Value Date    ALT 15 01/22/2021           Lab Results   Component Value Date    AST 15 01/22/2021       Results reviewed, labs MET treatment parameters, ok to proceed with treatment.      Post Infusion Assessment:  Patient tolerated injection without incident.  Site patent and intact, free from redness, edema or discomfort.       Discharge Plan:   Patient discharged in stable condition accompanied by: self.  Departure Mode: Ambulatory.    Rocio Ybarra CMA

## 2021-04-20 NOTE — PATIENT INSTRUCTIONS
Patient Education   Personalized Prevention Plan  You are due for the preventive services outlined below.  Your care team is available to assist you in scheduling these services.  If you have already completed any of these items, please share that information with your care team to update in your medical record.  Health Maintenance Due   Topic Date Due     Zoster (Shingles) Vaccine (2 of 3) 07/18/2013     Diptheria Tetanus Pertussis (DTAP/TDAP/TD) Vaccine (2 - Td) 02/01/2015       Exercise for a Healthier Heart  You may wonder how you can improve the health of your heart. If you re thinking about exercise, you re on the right track. You don t need to become an athlete. But you do need a certain amount of brisk exercise to help strengthen your heart. If you have been diagnosed with a heart condition, your healthcare provider may advise exercise to help stabilize your condition. To help make exercise a habit, choose safe, fun activities.      Exercise with a friend. When activity is fun, you're more likely to stick with it.   Before you start  Check with your healthcare provider before starting an exercise program. This is especially important if you have not been active for a while. It's also important if you have a long-term (chronic) health problem such as heart disease, diabetes, or obesity. Or if you are at high risk for having these problems.   Why exercise?  Exercising regularly offers many healthy rewards. It can help you do all of the following:     Improve your blood cholesterol level to help prevent further heart trouble    Lower your blood pressure to help prevent a stroke or heart attack    Control diabetes, or reduce your risk of getting this disease    Improve your heart and lung function    Reach and stay at a healthy weight    Make your muscles stronger so you can stay active    Prevent falls and fractures by slowing the loss of bone mass (osteoporosis)    Manage stress better    Reduce your blood  pressure    Improve your sense of self and your body image  Exercise tips      Ease into your routine. Set small goals. Then build on them. If you are not sure what your activity level should be, talk with your healthcare provider first before starting an exercise routine.    Exercise on most days. Aim for a total of 150 minutes (2 hours and 30 minutes) or more of moderate-intensity aerobic activity each week. Or 75 minutes (1 hour and 15 minutes) or more of vigorous-intensity aerobic activity each week. Or try for a combination of both. Moderate activity means that you breathe heavier and your heart rate increases but you can still talk. Think about doing 40 minutes of moderate exercise, 3 to 4 times a week. For best results, activity should last for about 40 minutes to lower blood pressure and cholesterol. It's OK to work up to the 40-minute period over time. Examples of moderate-intensity activity are walking 1 mile in 15 minutes. Or doing 30 to 45 minutes of yard work.    Step up your daily activity level.  Along with your exercise program, try being more active the whole day. Walk instead of drive. Or park further away so that you take more steps each day. Do more household tasks or yard work. You may not be able to meet the advised mount of physical activity. But doing some moderate- or vigorous-intensity aerobic activity can help reduce your risk for heart disease. Your healthcare provider can help you figure out what is best for you.    Choose 1 or more activities you enjoy.  Walking is one of the easiest things you can do. You can also try swimming, riding a bike, dancing, or taking an exercise class.    When to call your healthcare provider  Call your healthcare provider if you have any of these:     Chest pain or feel dizzy or lightheaded    Burning, tightness, pressure, or heaviness in your chest, neck, shoulders, back, or arms    Abnormal shortness of breath    More joint or muscle pain    A very fast  or irregular heartbeat (palpitations)  [employer] last reviewed this educational content on 7/1/2019 2000-2021 [employer]. All rights reserved. This information is not intended as a substitute for professional medical care. Always follow your healthcare professional's instructions.          Understanding USDA MyPlate  The USDA has guidelines to help you make healthy food choices. These are called MyPlate. MyPlate shows the food groups that make up healthy meals using the image of a place setting. Before you eat, think about the healthiest choices for what to put on your plate or in your cup or bowl. To learn more about building a healthy plate, visit www.choosemyplate.gov.    The food groups    Fruits. Any fruit or 100% fruit juice counts as part of the Fruit Group. Fruits may be fresh, canned, frozen, or dried, and may be whole, cut-up, or pureed. Make 1/2 of your plate fruits and vegetables.    Vegetables. Any vegetable or 100% vegetable juice counts as a member of the Vegetable Group. Vegetables may be fresh, frozen, canned, or dried. They can be served raw or cooked and may be whole, cut-up, or mashed. Make 1/2 of your plate fruits and vegetables.    Grains. All foods made from grains are part of the Grains Group. These include wheat, rice, oats, cornmeal, and barley. Grains are often used to make foods such as bread, pasta, oatmeal, cereal, tortillas, and grits. Grains should be no more than 1/4 of your plate. At least half of your grains should be whole grains.    Protein. This group includes meat, poultry, seafood, beans and peas, eggs, processed soy products (such as tofu), nuts (including nut butters), and seeds. Make protein choices no more than 1/4 of your plate. Meat and poultry choices should be lean or low fat.    Dairy. The Dairy Group includes all fluid milk products and foods made from milk that contain calcium, such as yogurt and cheese. (Foods that have little calcium, such as  cream, butter, and cream cheese, are not part of this group.) Most dairy choices should be low-fat or fat-free.    Oils. Oils aren't a food group, but they do contain essential nutrients. However it's important to watch your intake of oils. These are fats that are liquid at room temperature. They include canola, corn, olive, soybean, vegetable, and sunflower oil. Foods that are mainly oil include mayonnaise, certain salad dressings, and soft margarines. You likely already get your daily oil allowance from the foods you eat.  Things to limit  Eating healthy also means limiting these things in your diet:       Salt (sodium). Many processed foods have a lot of sodium. To keep sodium intake down, eat fresh vegetables, meats, poultry, and seafood when possible. Purchase low-sodium, reduced-sodium, or no-salt-added food products at the store. And don't add salt to your meals at home. Instead, season them with herbs and spices such as dill, oregano, cumin, and paprika. Or try adding flavor with lemon or lime zest and juice.    Saturated fat. Saturated fats are most often found in animal products such as beef, pork, and chicken. They are often solid at room temperature, such as butter. To reduce your saturated fat intake, choose leaner cuts of meat and poultry. And try healthier cooking methods such as grilling, broiling, roasting, or baking. For a simple lower-fat swap, use plain nonfat yogurt instead of mayonnaise when making potato salad or macaroni salad.    Added sugars. These are sugars added to foods. They are in foods such as ice cream, candy, soda, fruit drinks, sports drinks, energy drinks, cookies, pastries, jams, and syrups. Cut down on added sugars by sharing sweet treats with a family member or friend. You can also choose fruit for dessert, and drink water or other unsweetened beverages.     [employer] last reviewed this educational content on 6/1/2020 2000-2021 [employer]. All rights reserved.  This information is not intended as a substitute for professional medical care. Always follow your healthcare professional's instructions.          Signs of Hearing Loss      Hearing much better with one ear can be a sign of hearing loss.   Hearing loss is a problem shared by many people. In fact, it is one of the most common health problems, particularly as people age. Most people age 65 and older have some hearing loss. By age 80, almost everyone does. Hearing loss often occurs slowly over the years. So you may not realize your hearing has gotten worse.  Have your hearing checked  Call your healthcare provider if you:    Have to strain to hear normal conversation    Have to watch other people s faces very carefully to follow what they re saying    Need to ask people to repeat what they ve said    Often misunderstand what people are saying    Turn the volume of the television or radio up so high that others complain    Feel that people are mumbling when they re talking to you    Find that the effort to hear leaves you feeling tired and irritated    Notice, when using the phone, that you hear better with one ear than the other  [employer] last reviewed this educational content on 1/1/2020 2000-2021 [employer]. All rights reserved. This information is not intended as a substitute for professional medical care. Always follow your healthcare professional's instructions.          Urinary Incontinence, Female (Adult)   Urinary incontinence means loss of bladder control. This problem affects many women, especially as they get older. If you have incontinence, you may be embarrassed to ask for help. But know that this problem can be treated.   Types of Incontinence  There are different types of incontinence. Two of the main types are described here. You can have more than one type.     Stress incontinence. With this type, urine leaks when pressure (stress) is put on the bladder. This may happen when you  cough, sneeze, or laugh. Stress incontinence most often occurs because the pelvic floor muscles that support the bladder and urethra are weak. This can happen after pregnancy and vaginal childbirth or a hysterectomy. It can also be due to excess body weight or hormone changes.    Urge incontinence (also called overactive bladder). With this type, a sudden urge to urinate is felt often. This may happen even though there may not be much urine in the bladder. The need to urinate often during the night is common. Urge incontinence most often occurs because of bladder spasms. This may be due to bladder irritation or infection. Damage to bladder nerves or pelvic muscles, constipation, and certain medicines can also lead to urge incontinence.  Treatment depends on the cause. Further evaluation is needed to find the type you have. This will likely include an exam and certain tests. Based on the results, you and your healthcare provider can then plan treatment. Until a diagnosis is made, the home care tips below can help ease symptoms.   Home care    Do pelvic floor muscle exercises, if they are prescribed. The pelvic floor muscles help support the bladder and urethra. Many women find that their symptoms improve when doing special exercises that strengthen these muscles. To do the exercises, contract the muscles you would use to stop your stream of urine. But do this when you re not urinating. Hold for 10 seconds, then relax. Repeat 10 to 20 times in a row, at least 3 times a day. Your healthcare provider may give you other instructions for how to do the exercises and how often.    Keep a bladder diary. This helps track how often and how much you urinate over a set period of time. Bring this diary with you to your next visit with the provider. The information can help your provider learn more about your bladder problem.    Lose weight, if advised to by your provider. Extra weight puts pressure on the bladder. Your provider  can help you create a weight-loss plan that s right for you. This may include exercising more and making certain diet changes.    Don't have foods and drinks that may irritate the bladder. These can include alcohol and caffeinated drinks.    Quit smoking. Smoking and other tobacco use can lead to a long-term (chronic) cough that strains the pelvic floor muscles. Smoking may also damage the bladder and urethra. Talk with your provider about treatments or methods you can use to quit smoking.    If drinking large amounts of fluid makes you have symptoms, you may be advised to limit your fluid intake. You may also be advised to drink most of your fluids during the day and to limit fluids at night.    If you re worried about urine leakage or accidents, you may wear absorbent pads to catch urine. Change the pads often. This helps reduce discomfort. It may also reduce the risk of skin or bladder infections.    Follow-up care  Follow up with your healthcare provider, or as directed. It may take some to find the right treatment for your problem. But healthy lifestyle changes can be made right away. These include such things as exercising on a regular basis, eating a healthy diet, losing weight (if needed), and quitting smoking. Your treatment plan may include special therapies or medicines. Certain procedures or surgery may also be options. Talk about any questions you have with your provider.   When to seek medical advice  Call the healthcare provider right away if any of these occur:    Fever of 100.4 F (38 C) or higher, or as directed by your provider    Bladder pain or fullness    Belly swelling    Nausea or vomiting    Back pain    Weakness, dizziness, or fainting  [employer] last reviewed this educational content on 1/1/2020 2000-2021 [employer]. All rights reserved. This information is not intended as a substitute for professional medical care. Always follow your healthcare professional's  "instructions.          Depression and Suicide in Older Adults    Nearly 2 million older Americans have some type of depression. Some of them even take their own lives. Yet depression among older adults is often ignored. Learn the warning signs. You may help spare a loved one needless pain. You may also save a life.   What is depression?  Depression is a common and serious illness that affects the way you think and feel. It is not a normal part of aging, nor is it a sign of weakness, a character flaw, or something you can snap out of. Most people with depression need treatment to get better. The most common symptom is a feeling of deep sadness. People who are depressed also may seem tired and listless. And nothing seems to give them pleasure. It s normal to grieve or be sad sometimes. But sadness lessens or passes with time. Depression rarely goes away or improves on its own. A person with clinical depression can't \"snap out of it.\" Other symptoms of depression are:     Sleeping more or less than normal    Eating more or less than normal    Having headaches, stomachaches, or other pains that don t go away    Feeling nervous,  empty,  or worthless    Crying a great deal    Thinking or talking about suicide or death    Loss of interest in activities previously enjoyed    Social isolation    Feeling confused or forgetful  What causes it?  The causes of depression aren t fully known. But it is thought to result from a complex blend of these factors:     Biochemistry. Certain chemicals in the brain play a role.    Genes. Depression does run in families.    Life stress. Life stresses can also trigger depression in some people. Older adults often face many stressors, such as death of friends or a spouse, health problems, and financial concerns.    Chronic conditions. This includes conditions such as diabetes, heart disease, or cancer. These can cause symptoms of depression. Medicine side effects can cause changes in " thoughts and behaviors.  How you can help  Often, depressed people may not want to ask for help. When they do, they may be ignored. Or, they may receive the wrong treatment. You can help by showing parents and older friends love and support. If they seem depressed, don t lecture the person, ignore the symptoms, or discount the symptoms as a  normal  part of aging -which they are not. Get involved, listen, and show interest and support.   Help them understand that depression is a treatable illness. Tell them you can help them find the right treatment. Offer to go to their healthcare provider's appointment with them for support when the symptoms are discussed. With their approval, contact a local mental health center, social service agency, or hospital about services.   You can be an advocate for him or her at healthcare appointments. Many older adults have chronic illnesses that can cause symptoms of depression. Medicine side effects can change thoughts and behaviors. You can help make sure that the healthcare provider looks at all of these factors. He or she should refer your family member or friend to a mental healthcare provider when needed. in some cases, untreated depression can lead to a misdiagnosis. A person may be diagnosed with a brain disorder such as dementia. If the healthcare provider does not take the issue of depression seriously, help your family member or friend to find another provider.   Don't be afraid to ask  If you think an older person you care about could be suicidal, ask,  Have you thought about suicide?  Most people will tell you the truth. If they say  yes,  they may already have a plan for how and when they will attempt it. Find out as much as you can. The more detailed the plan, and the easier it is to carry out, the more danger the person is in right now. Tell the person you are there for them and do not want them to harm him or herself. Don't wait to get help for the person. Call the  person's healthcare provider, local hospital, or emergency services.   To learn more    National Suicide Prevention Lifeline (crisis hotline) 195-311-FBIQ (600-524-2484)    National [place] of Mental Wttkig835-413-3314yup.[facility].nih.gov    National [place] on Mental Ozfmjdz546-274-4002ffe.[place].org    Mental Health Ctebwim592-072-5665sgy.[facility].org    National Suicide Xornaay037-FWZRYRV (251-992-9048)    Call 911  Never leave the person alone. A person who is actively suicidal needs psychiatric care right away. They will need constant supervision. Never leave the person out of sight. Call 911 or the national 24-hour suicide crisis hotline at 348-100-TSUC (334-399-8932). You can also take the person to the closest emergency room.   [employer] last reviewed this educational content on 5/1/2020 2000-2021 [employer]. All rights reserved. This information is not intended as a substitute for professional medical care. Always follow your healthcare professional's instructions.

## 2021-06-01 NOTE — PROGRESS NOTES
Assessment & Plan     Other chronic pain/ Scoliosis, unspecified scoliosis type, unspecified spinal region  She is following with her back specialist who advised nonsurgical management.  She will be obtaining the MRI and they will be considering a back brace and physical therapy.  She has been seeing me for her pain management.  She had been doing quite well on oxycodone 5 mg 4 times daily but her pain has been worsening and she is no longer sleeping well.  We will increase her oxycodone to 5 mg every 4 hours for a total of 6 tablets a day.  Patient had been on this in the past and had tolerated it well.  She admits to having some constipation but she is taking her senna regularly.  She will continue on her gabapentin 300 mg 4 times a day.  She will follow-up with me in 1 month.  If this is not controlling her pain at that time I would recommend a consult with pain management.    - oxyCODONE (ROXICODONE) 5 MG tablet; Take 1 tablet (5 mg) by mouth every 4 hours as needed for severe pain TAKE ONE TABLET BY MOUTH EVERY 6 HOURS AS NEEDED FOR SEVERE PAIN    Return in about 4 weeks (around 6/29/2021) for pain follow up.    Silvia Matthews MD  [facility] REGGIE Pandey is a 80 year old who presents for the following health issues     HPI     Chronic Pain Follow-Up    Where in your body do you have pain? Left leg  How has your pain affected your ability to work? Not applicable  Which of these pain treatments have you tried since your last clinic visit? Other: topical ointments  How well are you sleeping? Taking Trazodone for sleep which is helpful  How has your mood been since your last visit? Much worse  Have you had a significant life event? No  Other aggravating factors: using the leg at all   Taking medication as directed? Yes. Oxycodone is helpful but not as much as it was before     PHQ-9 SCORE 1/24/2020 8/6/2020 4/20/2021   PHQ-9 Total Score - - -   PHQ-9 Total Score Karma  - - -   PHQ-9 Total Score 10 1 6     RACHID-7 SCORE 12/7/2018 8/20/2019 4/20/2021   Total Score - - -   Total Score 5 4 6     No flowsheet data found.  Encounter-Level CSA - 05/18/2016:    Controlled Substance Agreement - Scan on 5/31/2016  2:21 PM: CONTROLLED SUBSTANCE AGREEMENT     Patient-Level CSA:    Controlled Substance Agreement - Opioid - Scan on 2/6/2021  7:49 PM         Patient has chronic severe scoliosis.  She previously had been on high-dose opioids and over the years have been able to decrease them.  She had been stable on oxycodone 4 times a day.  However about 6 weeks ago she had increasing back pain.  These radiated into both of her legs.  She saw orthopedics as she was concerned it was related to her knees.  Orthopedics felt her knees were doing well and thought the pain was referred from her back.  She then saw her back specialist.  They felt she had progressive degenerative scoliosis and ordered an MRI to evaluate her neurogenic claudication symptoms.  They had discussed using a back brace, physical therapy and pain management.  They did not feel that surgical management was advised at her age.  She currently is waiting to get the MRI done as there is some insurance complications.        Objective    /72   Pulse 71   Temp 98.8  F (37.1  C) (Temporal)   Resp 16   LMP 02/01/2011   SpO2 97%   There is no height or weight on file to calculate BMI.  Physical Exam   Gen: no apparent distress  Back: Severe scoliosis.    Reviewed orthopedics and spine Center notes.  Patient's most recent back x-rays reveal severe kyphosis of 105 degrees and her lumbar scoliosis at 67 degrees.

## 2021-06-28 NOTE — PROGRESS NOTES
Assessment & Plan     Other chronic pain/Primary osteoarthritis involving multiple joints/Scoliosis, unspecified scoliosis type, unspecified spinal region  At the last visit patient's oxycodone was increased from 4 tablets a day to 6 tablets a day.  She states she still feels miserable.  She is following with a back specialist and will be having an MRI to assess whether she can have any injections but the MRI is not until the end of next month.  She wonders what her other options are.  She is already taking gabapentin.  As we have discussed in the past that if her opioids and gabapentin are not helping that I would appreciate the inside of a pain specialist.  Patient was agreeable to see pain specialist.  I will continue to prescribe her oxycodone at its current dose.  Discussed that pain management may not change anything and if so I will continue to see her for oxycodone.      - PAIN MANAGEMENT REFERRAL; Future    Pain in both upper arms  This is new and interfering with her use of her walker.  No obvious abnormality on exam.  Will refer to orthopedics.    - Orthopedic  Referral; Future    Asymptomatic microscopic hematuria  Patient was found to have asymptomatic microscopic hematuria the last couple times her urine was done.  Discussed that this could be the result of her Xarelto but I feel she needs evaluation by urology.  Referral has been placed a couple months ago but they were unable to schedule and patient does not recall being told that she had blood in her urine.  After discussion she is agreeable to consult.  Referral was placed.    - Adult Urology Referral; Future    Silvia Matthews MD  [facility] REGGIE Pandey is a 80 year old who presents for the following health issues  accompanied by her spouse:    HPI     Chronic Pain Follow-Up    Where in your body do you have pain? Neck, arm, leg, feet, buttock  How has your pain affected your ability to work?  Not applicable  Which of these pain treatments have you tried since your last clinic visit? Other: nothing new  How well are you sleeping? Fair  How has your mood been since your last visit? Slightly worse  Have you had a significant life event? No  Other aggravating factors: any movement   Taking medication as directed? Yes    PHQ-9 SCORE 1/24/2020 8/6/2020 4/20/2021   PHQ-9 Total Score - - -   PHQ-9 Total Score MyChart - - -   PHQ-9 Total Score 10 1 6     RACHID-7 SCORE 12/7/2018 8/20/2019 4/20/2021   Total Score - - -   Total Score 5 4 6     No flowsheet data found.  Encounter-Level CSA - 05/18/2016:    Controlled Substance Agreement - Scan on 5/31/2016  2:21 PM: CONTROLLED SUBSTANCE AGREEMENT     Patient-Level CSA:    Controlled Substance Agreement - Opioid - Scan on 2/6/2021  7:49 PM       Following with back specialist, needs MRI before having steroid shot.  Will be having MRI for 7/27, will have preop and Covid test 3 days before, fax results to [phone].    Last visit increased her oxycodone from 5 mg four times daily to six times daily and she still feels miserable, thinks it night have helped a little bit.    States right upper arm hurts, broke it in 1978, took a year to heal, then has had no pain until the last month and the pain feels like it is right over her break, thinks it is worsened by using her walker.   Left hurts a little, but right really hurts.  No swelling.        Objective    BP (!) 160/90   Pulse 86   Temp 97  F (36.1  C) (Temporal)   Resp 14   LMP 02/01/2011   SpO2 97%   There is no height or weight on file to calculate BMI.  Physical Exam   Gen: no apparent distress  MSK: Severe kyphoscoliosis noted.  Uses walker for ambulation.  Indicates pain in her mid humerus bilaterally.  Mild tenderness to palpation.  Psych: Alert and oriented times 3; coherent speech, normal   rate and volume, able to articulate logical thoughts, able   to abstract reason, no tangential thoughts, no  hallucinations   or delusions  Her affect is neutral

## 2021-07-07 PROBLEM — M19.012 OSTEOARTHRITIS OF BOTH GLENOHUMERAL JOINTS: Status: ACTIVE | Noted: 2021-01-01

## 2021-07-07 PROBLEM — M19.011 OSTEOARTHRITIS OF BOTH GLENOHUMERAL JOINTS: Status: ACTIVE | Noted: 2021-01-01

## 2021-07-07 NOTE — PROGRESS NOTES
[facility]  SPORTS MEDICINE CLINIC VISIT     Jul 7, 2021        ASSESSMENT & PLAN    80-year-old with bilateral shoulder pain likely secondary to aggravation of osteoarthritis    Reviewed imaging and assessment with patient in detail  Recommended that she follow-up with physical therapy for walker assessment.  He may be able to help her adjust her walker or recommend alternate walker that would be less stressful on her shoulders.  She also expressed interest in ultrasound-guided glenohumeral injection.  She will be assisted in scheduling.  Also discussed that it is okay to use Voltaren gel on her shoulders up to 4 times daily for pain    Jeremy Carson MD  [facility] SPORTS MEDICINE [facility]    -----  Chief Complaint   Patient presents with     Consult     bilateral upper arm pain, history of right humerus fracture 1978       SUBJECTIVE  Katherin Jonas is a/an 80 year old female who is seen as a self referral for evaluation of  Bilateral upper arm pain.     The patient is seen with their .  The patient is Right handed    Onset: Has been worse over the last 3 to 4 weeks since she began using her walker.  No previous injury since 1978.  Location of Pain: Bilateral shoulder, right worse than left  Worsened by: using the walker   Better with: rest  Treatments tried: no treatment tried to date  Associated symptoms: no distal numbness or tingling; denies swelling or warmth    Orthopedic/Surgical history: YES - Date: 1978 proximal humerus fracture. Has not had much pain in right shoulder until recently  Social History/Occupation: retired      REVIEW OF SYSTEMS:    Do you have fever, chills, weight loss? No    Do you have any vision problems? No    Do you have any chest pain or edema? No    Do you have any shortness of breath or wheezing?  No    Do you have stomach problems? No    Do you have any numbness or focal weakness? No    Do you have diabetes? No    Do you have problems with  bleeding or clotting? No    Do you have an rashes or other skin lesions? No    OBJECTIVE:  [facility] 02/01/2011      EXAM:  Alert, pleasant and conversational      Bilateral shoulder:   Skin intact. No skin changes, deformity or atrophy    AROM:   Symmetric with pain on terminal flexion and external rotation bilaterally    Strength testing:   Abduction: 4+/5,   External rotation: 4+/5   Internal rotation 5/5     Deltoids 5/5, Biceps 5/5, Triceps 5/5,  5/5.    Palpation: negative TTP of the Acromioclavicular joint  negative TTP of Sternoclavicular joint  positive  TTP of posterior glenoid on the right.  Negative on the left  negative TTP of scapular borders  negative TTP of the bicipital tendon.     Special Tests: positive  Allen test on right  positive  Neer's test.  On the right  positive Sheridan's test   negative Speed's test.    Neurovascularly intact bilateral upper extremities.      RADIOLOGY:    4 view xrays of bilateral shoulders performed and reviewed independently demonstrating moderate DJD of the glenohumeral joints, more severe joint space loss on the left.  No acute findings. See EMR for formal radiology report.

## 2021-07-07 NOTE — LETTER
7/7/2021         RE: Katherin Jonas  [address] [address] [place] [id number]        Dear Colleague,    Thank you for referring your patient, Katherin Jonas, to the [facility] SPORTS MEDICINE [facility]. Please see a copy of my visit note below.      [facility]  SPORTS MEDICINE CLINIC VISIT     Jul 7, 2021        ASSESSMENT & PLAN    80-year-old with bilateral shoulder pain likely secondary to aggravation of osteoarthritis    Reviewed imaging and assessment with patient in detail  Recommended that she follow-up with physical therapy for walker assessment.  He may be able to help her adjust her walker or recommend alternate walker that would be less stressful on her shoulders.  She also expressed interest in ultrasound-guided glenohumeral injection.  She will be assisted in scheduling.  Also discussed that it is okay to use Voltaren gel on her shoulders up to 4 times daily for pain    Jeremy Carson MD  [facility]    -----  Chief Complaint   Patient presents with     Consult     bilateral upper arm pain, history of right humerus fracture 1978       SUBJECTIVE  Katherin Jonas is a/an 80 year old female who is seen as a self referral for evaluation of  Bilateral upper arm pain.     The patient is seen with their .  The patient is Right handed    Onset: Has been worse over the last 3 to 4 weeks since she began using her walker.  No previous injury since 1978.  Location of Pain: Bilateral shoulder, right worse than left  Worsened by: using the walker   Better with: rest  Treatments tried: no treatment tried to date  Associated symptoms: no distal numbness or tingling; denies swelling or warmth    Orthopedic/Surgical history: YES - Date: 1978 proximal humerus fracture. Has not had much pain in right shoulder until recently  Social History/Occupation: retired      REVIEW OF SYSTEMS:    Do you have fever, chills, weight loss? No    Do you  have any vision problems? No    Do you have any chest pain or edema? No    Do you have any shortness of breath or wheezing?  No    Do you have stomach problems? No    Do you have any numbness or focal weakness? No    Do you have diabetes? No    Do you have problems with bleeding or clotting? No    Do you have an rashes or other skin lesions? No    OBJECTIVE:  [facility] 02/01/2011      EXAM:  Alert, pleasant and conversational      Bilateral shoulder:   Skin intact. No skin changes, deformity or atrophy    AROM:   Symmetric with pain on terminal flexion and external rotation bilaterally    Strength testing:   Abduction: 4+/5,   External rotation: 4+/5   Internal rotation 5/5     Deltoids 5/5, Biceps 5/5, Triceps 5/5,  5/5.    Palpation: negative TTP of the Acromioclavicular joint  negative TTP of Sternoclavicular joint  positive  TTP of posterior glenoid on the right.  Negative on the left  negative TTP of scapular borders  negative TTP of the bicipital tendon.     Special Tests: positive  Allen test on right  positive  Neer's test.  On the right  positive Dallas's test   negative Speed's test.    Neurovascularly intact bilateral upper extremities.      RADIOLOGY:    4 view xrays of bilateral shoulders performed and reviewed independently demonstrating moderate DJD of the glenohumeral joints, more severe joint space loss on the left.  No acute findings. See EMR for formal radiology report.                    Again, thank you for allowing me to participate in the care of your patient.        Sincerely,        Jeremy Carson MD

## 2021-07-07 NOTE — PATIENT INSTRUCTIONS
Thanks for coming today.  Ortho/Sports Medicine Clinic  [address] [place] [address]    To schedule future appointments in Ortho Clinic, you may call [phone].    To schedule ordered imaging by your Provider: Call [facility] at [phone]    Peela available online at:   [email or web address]    Please call if any further questions or concerns [phone] and ask for the Orthopedic Department. Clinic hours 8 am to 5 pm.    Return to clinic if symptoms worsen.

## 2021-07-14 NOTE — PATIENT INSTRUCTIONS
Thanks for coming today.  Ortho/Sports Medicine Clinic  [address] [place] [address]    To schedule future appointments in Ortho Clinic, you may call [phone].    To schedule ordered imaging by your provider:   Call [facility] [phone]    To schedule an injection ordered by your provider:  Call [facility] Injection scheduling line: [phone]  [email or web address] available online at:  [email or web address]    Please call if any further questions or concerns ([phone]).  Clinic hours 8 am to 5 pm.    Return to clinic (call) if symptoms worsen or fail to improve.

## 2021-07-14 NOTE — LETTER
2021         RE: Katherin Jonas  [address]  [facility] [address]        Dear Colleague,    Thank you for referring your patient, Katherin Jonas, to the [facility] SPORTS MEDICINE [facility]. Please see a copy of my visit note below.      [facility]  SPORTS MEDICINE CLINIC VISIT     2021      ASSESSMENT & PLAN    79 y/o female with OA of the Right shoulder    Reviewed imaging and assessment with patient in detail  Intraarticular injection, see procedure note for details  Can repeat after 3 months if necessary    Jeremy Carson MD  [facility]    -----  Chief Complaint   Patient presents with     RECHECK     Follow up for right shoulder GH injection        SUBJECTIVE  Katherin Jonas is a/an 80 year old female who is seen for follow up of right shoulder GH injection     The patient is seen by themselves.    Date of injury: No injury  Date of Last Visit: 21   Symptoms: improved  Worsened by: Movement, throughout the day  Better with: rest  Treatment to date: no treatment tried to date  Associated symptoms: pain        REVIEW OF SYSTEMS:    See HPI     OBJECTIVE:  LMP 2011      No exam this visit       [facility]   ORTHOPEDICS & SPORTS MEDICINE  [address] [place] [address]  Dept: [phone]  ______________________________________________________________________________    Patient: Katherin Jonas   : 1940   MRN: [id number]   2021    INVASIVE PROCEDURE SAFETY CHECKLIST    Date: 21  Procedure:Right shoulder GH injection  Patient Name: Katherin Jonas  MRN: [id number]  YOB: 1940    Action: Complete sections as appropriate. Any discrepancy results in a HARD COPY until resolved.     PRE PROCEDURE:  Patient ID verified with 2 identifiers (name and  or MRN): Yes  Procedure and site verified with patient/designee (when able): Yes  Accurate consent  documentation in medical record: Yes  H&P (or appropriate assessment) documented in medical record: NA  H&P must be up to 20 days prior to procedure and updates within 24 hours of procedure as applicable: NA  Relevant diagnostic and radiology test results appropriately labeled and displayed as applicable: NA  Procedure site(s) marked with provider initials: NA    TIMEOUT:  Time-Out performed immediately prior to starting procedure, including verbal and active participation of all team members addressing the following:Yes  * Correct patient identify  * Confirmed that the correct side and site are marked  * An accurate procedure consent form  * Agreement on the procedure to be done  * Correct patient position  * Relevant images and results are properly labeled and appropriately displayed  * The need to administer antibiotics or fluids for irrigation purposes during the procedure as applicable   * Safety precautions based on patient history or medication use    DURING PROCEDURE: Verification of correct person, site, and procedures any time the responsibility for care of the patient is transferred to another member of the care team.       Prior to injection, verified patient identity using patient's name and date of birth.  Due to injection administration, patient instructed to remain in clinic for 15 minutes  afterwards, and to report any adverse reaction to me immediately.    Joint injection was performed.      Drug Amount Wasted:  None.  Vial/Syringe: Single dose vial  Expiration Date:  12/2021      Antonia Abel [facility]  July 14, 2021    Ultrasound Guided Right Glenohumeral injection    Date/Time: 7/14/2021 9:57 AM  Performed by: Jeremy Carson MD  Authorized by: Jeremy Carson MD     Indications:  Pain and osteoarthritis  Needle Size:  22 G  Guidance: ultrasound    Location:  Shoulder      Site:  R glenohumeral joint  Medications:  40 mg methylPREDNISolone 40 MG/ML  Procedure discussed:  discussed risks, benefits, and alternatives    Consent Given by:  Patient  Timeout: timeout called immediately prior to procedure    Prep: patient was prepped and draped in usual sterile fashion     PROCEDURE: Ultrasound Guided Right Glehohumeral Injection   The patient was apprised of the risks and the benefits of the procedure written consent was signed by the patient.   The patient was positioned seated in a chair.  The posterior glenohumeral joint was identified with ultrasound and marked with a pen.  This area was then cleaned with a chlorhexidine swab.  Anesthesia of the skin was obtained with 3mL 1% lidocaine.  Next using direct and continuous ultrasound guidance with sterile technique a 22-gauge needle was introduced into the glenohumeral joint and a solution of 40 mg methylprednisolone and 2mL 1% lidocaine was injected and seen flowing into the joint space.  Images were captured and saved to the permanent record.  The patient tolerated the procedure well and there were no immediate complications.  Patient was instructed to ice the shoulder upon leaving the clinic and refrain from overuse for the next 2 days.  Follow-up promptly for any increase in pain, swelling, redness or warmth from the injection site.  Otherwise routine postinjection instructions were given.    Jeremy Carson MD            Again, thank you for allowing me to participate in the care of your patient.        Sincerely,        Jeremy Carson MD

## 2021-07-14 NOTE — PROGRESS NOTES
[facility]  SPORTS MEDICINE CLINIC VISIT     2021      ASSESSMENT & PLAN    79 y/o female with OA of the Right shoulder    Reviewed imaging and assessment with patient in detail  Intraarticular injection, see procedure note for details  Can repeat after 3 months if necessary    Jeremy Carson MD  [facility] SPORTS MEDICINE [facility]    -----  Chief Complaint   Patient presents with     RECHECK     Follow up for right shoulder GH injection        SUBJECTIVE  Katherin Jonas is a/an 80 year old female who is seen for follow up of right shoulder GH injection     The patient is seen by themselves.    Date of injury: No injury  Date of Last Visit: 21   Symptoms: improved  Worsened by: Movement, throughout the day  Better with: rest  Treatment to date: no treatment tried to date  Associated symptoms: pain        REVIEW OF SYSTEMS:    See HPI     OBJECTIVE:  LMP 2011      No exam this visit       [facility]   ORTHOPEDICS & SPORTS MEDICINE  [address]  [place] [address]  Dept: [phone]  ______________________________________________________________________________    Patient: Katherin Jonas   : 1940   MRN: [id number]   2021    INVASIVE PROCEDURE SAFETY CHECKLIST    Date: 21  Procedure:Right shoulder GH injection  Patient Name: Katherin Jonas  MRN: [id number]  YOB: 1940    Action: Complete sections as appropriate. Any discrepancy results in a HARD COPY until resolved.     PRE PROCEDURE:  Patient ID verified with 2 identifiers (name and  or MRN): Yes  Procedure and site verified with patient/designee (when able): Yes  Accurate consent documentation in medical record: Yes  H&P (or appropriate assessment) documented in medical record: NA  H&P must be up to 20 days prior to procedure and updates within 24 hours of procedure as applicable: NA  Relevant diagnostic and radiology test results appropriately labeled and  displayed as applicable: NA  Procedure site(s) marked with provider initials: NA    TIMEOUT:  Time-Out performed immediately prior to starting procedure, including verbal and active participation of all team members addressing the following:Yes  * Correct patient identify  * Confirmed that the correct side and site are marked  * An accurate procedure consent form  * Agreement on the procedure to be done  * Correct patient position  * Relevant images and results are properly labeled and appropriately displayed  * The need to administer antibiotics or fluids for irrigation purposes during the procedure as applicable   * Safety precautions based on patient history or medication use    DURING PROCEDURE: Verification of correct person, site, and procedures any time the responsibility for care of the patient is transferred to another member of the care team.       Prior to injection, verified patient identity using patient's name and date of birth.  Due to injection administration, patient instructed to remain in clinic for 15 minutes  afterwards, and to report any adverse reaction to me immediately.    Joint injection was performed.      Drug Amount Wasted:  None.  Vial/Syringe: Single dose vial  Expiration Date:  12/2021      Antonia Abel [facility]  July 14, 2021    Ultrasound Guided Right Glenohumeral injection    Date/Time: 7/14/2021 9:57 AM  Performed by: Jeremy Carson MD  Authorized by: Jeremy Carson MD     Indications:  Pain and osteoarthritis  Needle Size:  22 G  Guidance: ultrasound    Location:  Shoulder      Site:  R glenohumeral joint  Medications:  40 mg methylPREDNISolone 40 MG/ML  Procedure discussed: discussed risks, benefits, and alternatives    Consent Given by:  Patient  Timeout: timeout called immediately prior to procedure    Prep: patient was prepped and draped in usual sterile fashion     PROCEDURE: Ultrasound Guided Right Glehohumeral Injection   The patient was apprised of  the risks and the benefits of the procedure written consent was signed by the patient.   The patient was positioned seated in a chair.  The posterior glenohumeral joint was identified with ultrasound and marked with a pen.  This area was then cleaned with a chlorhexidine swab.  Anesthesia of the skin was obtained with 3mL 1% lidocaine.  Next using direct and continuous ultrasound guidance with sterile technique a 22-gauge needle was introduced into the glenohumeral joint and a solution of 40 mg methylprednisolone and 2mL 1% lidocaine was injected and seen flowing into the joint space.  Images were captured and saved to the permanent record.  The patient tolerated the procedure well and there were no immediate complications.  Patient was instructed to ice the shoulder upon leaving the clinic and refrain from overuse for the next 2 days.  Follow-up promptly for any increase in pain, swelling, redness or warmth from the injection site.  Otherwise routine postinjection instructions were given.    Jeremy Carson MD

## 2021-08-27 PROBLEM — D47.2 MGUS (MONOCLONAL GAMMOPATHY OF UNKNOWN SIGNIFICANCE): Status: ACTIVE | Noted: 2021-01-01

## 2021-08-27 NOTE — PROGRESS NOTES
Patient was on my schedule for telephone call.  However this was her  who wanted to discuss her cares with me as the patient currently is in a transitional care unit and I do not believe she has knowledge of this phone call was taking place.    She was hospitalized last month for septic shock.  She was in the ICU and required pressors, thoracentesis and blood transfusion.  She was diagnosed with MGUS.  Cardiology saw her for moderate to severe mitral regurgitation and severe tricuspid regurgitation and stated that she could follow-up with cardiology valve specialist as an outpatient but was concerned how she would do with any operative repair.     states that her back pain has been quite severe.  She was not able to go through MRI to evaluate for possible back injections like her back specialist to monitor to do.  In the TCU they are working on getting her onto a long acting narcotic with short acting tablets available for breakthrough pain.  He states that he thinks it might be helping but is quite concerned with her pain control.    She is receiving physical therapy.  He states that she has no swallowing problems.  He does state that she had a cognitive evaluation and was told that she had some memory loss and was recommended home care upon discharge.  They did not feel she would be ready for discharge for about another month.    He is wondering what will happen when she is discharged from TCU.  We discussed that we need to figure out what her goals of care are.  I am concerned with her multiple medical issues that she may not be a good candidate for valve repair surgery and may have a high mortality rate.  We discussed she can always have a consult with the specialist but she does not want to have further interventions we could always consider more comfort cares and pain control versus being aggressive with her medical interventions.  Discussed that she needs to be part of those discussions.    We  discussed that she can follow-up with hematology as an outpatient.  Discussed that if home care is recommended I would completely agree.  We also discussed that the nurse practitioner who is following her in the TCU is very knowledgeable and any concerns on her care while she is there he should talk to that nurse practitioner.    Spent 17 minutes on the phone with him.

## 2021-09-29 NOTE — TELEPHONE ENCOUNTER
Reason for call:  Other   Patient called regarding (reason for call): call back and Requesting Virtual Visit for upcoming visit    Additional comments:   Patient is asking if her appt on 10/1 can be a virtual visit. Please call to discuss.    Phone number to reach patient:  Cell number on file:    Telephone Information:   Mobile [phone]       Best Time:  any    Can we leave a detailed message on this number?  YES    Travel screening: Not Applicable

## 2021-09-30 NOTE — TELEPHONE ENCOUNTER
Reason for Call:  Form, our goal is to have forms completed with 72 hours, however, some forms may require a visit or additional information.    Type of letter, form or note:  medical    Who is the form from?: Home care    Where did the form come from: form was faxed in    What clinic location was the form placed at?: [facility] [phone]    Where the form was placed: team c Box/Folder    What number is listed as a contact on the form?: [phone]       Additional comments: PLEASE REVIEW, SIGN, AND RETURN FAX -[phone]    Call taken on 9/30/2021 at 8:00 AM by Grace Arzate

## 2021-10-01 PROBLEM — R29.898 SEVERE MUSCLE DECONDITIONING: Status: ACTIVE | Noted: 2021-01-01

## 2021-10-01 PROBLEM — I07.9 ENDOCARDITIS OF TRICUSPID VALVE: Status: ACTIVE | Noted: 2021-01-01

## 2021-10-01 PROBLEM — J90 PLEURAL EFFUSION: Status: ACTIVE | Noted: 2021-01-01

## 2021-10-01 PROBLEM — Z95.0 CARDIAC PACEMAKER IN SITU: Status: RESOLVED | Noted: 2019-09-17 | Resolved: 2021-01-01

## 2021-10-01 PROBLEM — R41.89 COGNITIVE IMPAIRMENT: Status: ACTIVE | Noted: 2021-01-01

## 2021-10-01 PROBLEM — I50.9 CHRONIC CONGESTIVE HEART FAILURE (H): Status: ACTIVE | Noted: 2021-01-01

## 2021-10-01 NOTE — TELEPHONE ENCOUNTER
Haydee from [facility] calling from PT needing orders for 2x a week for 4 weeks and 1 x week for 2 weeks for strengthen, balance and to train care giver   Needs Today she said.    Please advise.

## 2021-10-01 NOTE — PROGRESS NOTES
Katherin is a 80 year old who is being evaluated via a billable telephone visit.      What phone number would you like to be contacted at? [phone]  How would you like to obtain your AVS? MyChart    Assessment & Plan     Endocarditis of tricuspid valve/Paroxysmal atrial fibrillation (H)/Chronic congestive heart failure, unspecified heart failure type (H)  Patient is currently being treated with IV Ancef for total of 6 weeks.  Infectious disease is following.  She has an appointment with infectious disease in 2 weeks.  She is currently on furosemide 40 mg daily to help with fluid control.  She continues on oxygen.  They feel her breathing is stable.  She does have home care who is actively involved.  The  and daughter admit that this was more work than they were anticipating but they feel that they are adjusting.  She will follow-up with cardiology on 11/4/21.  At this time cardiology feels patient will need pacemaker repeat placed as her rate has been controlled.    Cognitive impairment  Family has noticed cognitive decline with her recent hospitalizations.  Felt to be related to how ill she has been.  We will continue to monitor.  Occupational therapy will be involved as well.    Other chronic pain  Her chronic pain from her severe scoliosis appears to be improved with MS Contin supplemented with as needed oxycodone.   states that she is getting 2-3 oxycodone a day.  They feel she is sleeping much better with the MS Contin.    Silvia Matthews MD  [facility]   Katherin is a 80 year old who presents for the following health issues  accompanied by her spouse Finn and daughter Martina.  Katherin is groggy, just got through Physical Therapy evaluation and recommended 1-2/week and RN was able to draw some blood.  She is groggy and tired after that. They will draw blood twice a week, checking CBC and BMP.  Occupational Therapy will be doing an evaluation as  well.    Patient is being seen by telephone visit as patient is quite weak and is difficult to get her out of the house.  Patient also was groggy and unable to participate much in the visit and most of the history is done by the  with input from their daughter.    [facility]       Hospital Follow-up Visit:    Hospital/Nursing Home/IP Rehab Facility: [facility]  Date of Admission: 9/6/2021  Date of Discharge: 9/28/2021  Reason(s) for Admission: sepsis      Was your hospitalization related to COVID-19? No   Problems taking medications regularly:  None  Medication changes since discharge: None  Problems adhering to non-medication therapy:  None    Summary of hospitalization:  CareEverywhere information obtained.  Patient was admitted for fevers, cough and shortness of breath and was found to have tricuspid valve endocarditis and CHF.  Her pacemaker was removed.  She was started on Ancef.  She had pleural effusions that were tapped twice.  Cardiology recommended that she remain on Lasix 40 mg daily.  And reviewed  Diagnostic Tests/Treatments reviewed.  Follow up needed: CBC and BMP  Other Healthcare Providers Involved in Patient s Care:         Homecare and Specialist appointment - ID and Cardiology  Update since discharge: stable.   and family feel that it is going OK, but didn't realize how much work it would be, they are giving her antibiotics, turning her in bed every couple hours.  Today just got her up in the wheelchair.  Will try to do that twice a day.  Has redness on buttocks, putting a cream on it.  Has had bowel movements 4 times since being home the last few days, pudding texture.  Has a Purewick catheter. Has a hospital bed, has petitioned to get an air mattress.  Has an archaic lift and afraid to use it.  Working with [facility] care to get everything arranged, will be meeting with  next week.  [facility] has reached out with advocates to help with what they are eligible  for. Will have an aide come in twice a week for bathing and other help.  Still on oxygen, 2L with oxygen sats in the mid 90s. Good appetite, but small appetite.  On MSContin (has improved her sleep) and prn oxycodone (2-3 times a day)        Post Discharge Medication Reconciliation: discharge medications reconciled, continue medications without change.  Plan of care communicated with patient and family                    Objective           Vitals:  No vitals were obtained today due to virtual visit.    Physical Exam   Gen: no distress  PSYCH: Alert; coherent speech, normal   rate and volume. Her affect is normal  RESP: No cough, no audible wheezing, able to talk in full sentences  Remainder of exam unable to be completed due to telephone visits          Phone call duration: 40 minutes

## 2021-10-04 NOTE — TELEPHONE ENCOUNTER
Reason for Call:  Form, our goal is to have forms completed with 72 hours, however, some forms may require a visit or additional information.    Type of letter, form or note:  medical    Who is the form from?: Home care    Where did the form come from: form was faxed in    What clinic location was the form placed at?: [facility] [phone]    Where the form was placed: TEAM C Box/Folder    What number is listed as a contact on the form?: [phone]       Additional comments: PLEASE REVIEW, SIGN, AND RETURN FAX [phone]    Call taken on 10/4/2021 at 9:39 AM by Grace Arzate

## 2021-10-10 NOTE — TELEPHONE ENCOUNTER
Sarina home care nurse calling reporting patient was seen at [facility] and asking if RN is able to access patient's UA records from [facility]. RN informed unable to seen patient's record from [facility]. Advised to contact [facility] for the UA result. Caller verbalized understanding. Denies further questions.      Ezra Ramírez RN  [facility]

## 2021-10-11 NOTE — TELEPHONE ENCOUNTER
TC patient. Reviewed last note 10/1/21 and .     OK for refills. E-prescribed. Please notify     Koby Scott-SKYLAR Hampton  [facility]

## 2021-10-11 NOTE — TELEPHONE ENCOUNTER
Spoke with     Needing a new script for the morphine and oxy    The morphine should be 15 mg cr tablet.  Every 12  Hours.       Oxycodone 5 mg  Every 4 hours as needed: average has been 2 day, he doesn't think she needs 180 tablets at a time since they pick them up monthly    Will be out on Wednesday    Pharmacy [facility].

## 2021-10-12 NOTE — TELEPHONE ENCOUNTER
**Please Do Not Close This Encounter**               ** Until This Has Been Addressed**          PRIOR AUTHORIZATION REQUIED PER INSURANCE       PATIENT:Katherin Jonas YOB: 1940          MEDICATION:Morphine Sulfate ER 15mg                    SIG:Take 15 mg by mouth every 12 hours       NDC:[address]-13      INSURANCE:[facility] [place] PART D       INSURANCE PHONE #:[phone]      ID #:[id number]      INSURANCE REJECT:75 - Prior Auth Req      PHARMACY:[facility]      PHARMACY NPI:[id number]      PHARMACY PHONE #:[phone]                                                          Please let us know when Prior Auth approved/denied, prescriber refusal to complete prior auth or if you would like to change medication/discontinue                                                            Thank you

## 2021-10-12 NOTE — TELEPHONE ENCOUNTER
Prior Authorization Approval    Authorization Effective Date: 7/14/2021  Authorization Expiration Date: 10/12/2022  Medication: Morphine -APPROVED   Approved Dose/Quantity:   Reference #: [id number]   Insurance Company: [employer] [place] - Phone [phone] Fax [phone]  Expected CoPay:       CoPay Card Available: No    Foundation Assistance Needed:    Which Pharmacy is filling the prescription (Not needed for infusion/clinic administered): [facility] - [place], [place] - [address], [address]  Pharmacy Notified: Yes  Patient Notified: Yes

## 2021-10-12 NOTE — TELEPHONE ENCOUNTER
Central Prior Authorization Team   Phone: [phone]      PA Initiation    Medication: Morphine  Insurance Company: [employer] [place] - Phone [phone] Fax [phone]  Pharmacy Filling the Rx: [place], [place] - [address], [address]  Filling Pharmacy Phone: [phone]  Filling Pharmacy Fax: [phone]  Start Date: 10/12/2021

## 2021-10-13 NOTE — TELEPHONE ENCOUNTER
Pending Prescriptions:                       Disp   Refills    XARELTO ANTICOAGULANT 15 MG TABS tablet [P*90 tab*1        Sig: TAKE ONE TABLET BY MOUTH ONCE DAILY      Routing refill request to provider for review/approval because:  Labs out of range:  creatinine  Labs not current:  creatinine     Vero Allen RN on 10/13/2021 at 9:23 AM

## 2021-10-14 NOTE — TELEPHONE ENCOUNTER
Reason for Call:  Form, our goal is to have forms completed with 72 hours, however, some forms may require a visit or additional information.    Type of letter, form or note:  orders    Who is the form from?: [facility] (if other please explain)    Where did the form come from: form was faxed in    What clinic location was the form placed at?: [facility] [phone]    Where the form was placed: Team C Box/Folder    What number is listed as a contact on the form?:  [phone]       Additional comments:  Fax [phone]    Call taken on 10/14/2021 at 7:42 AM by Yasmine Cedeño

## 2021-10-14 NOTE — TELEPHONE ENCOUNTER
Form placed in out of office box in Team C  for review/signature of covering provider.    Francisca KAY CMA

## 2021-10-15 NOTE — TELEPHONE ENCOUNTER
Reason for Call:  Form, our goal is to have forms completed with 72 hours, however, some forms may require a visit or additional information.    Type of letter, form or note:  medical    Who is the form from?: Home care    Where did the form come from: form was faxed in    What clinic location was the form placed at?: [facility] [phone]    Where the form was placed: Team C Form bin    What number is listed as a contact on the form?: fax [phone]       Additional comments: Please complete and fax    Call taken on 10/15/2021 at 7:12 AM by Lashell Tan

## 2021-10-21 NOTE — TELEPHONE ENCOUNTER
Reason for Call:  Form, our goal is to have forms completed with 72 hours, however, some forms may require a visit or additional information.    Type of letter, form or note:  orders    Who is the form from?: [facility] (if other please explain)    Where did the form come from: form was faxed in    What clinic location was the form placed at?: [facility] [phone]    Where the form was placed: Team C Box/Folder    What number is listed as a contact on the form?:   [phone]       Additional comments:  Fax [phone]    Call taken on 10/21/2021 at 6:59 AM by Yasmine Cedeño

## 2021-10-26 NOTE — TELEPHONE ENCOUNTER
Reason for Call:  Form, our goal is to have forms completed with 72 hours, however, some forms may require a visit or additional information.    Type of letter, form or note:  medical    Who is the form from?: [facility] needs completed forms and signature (if other please explain)    Where did the form come from: form was faxed in    What clinic location was the form placed at?: [facility] [phone]    Where the form was placed: Team C Box/Folder    What number is listed as a contact on the form?: [phone]       Additional comments: Please complete/sign and fax to [phone]    Call taken on 10/26/2021 at 9:45 AM by Martha Fletcher

## 2021-10-27 NOTE — TELEPHONE ENCOUNTER
Reason for Call:  Form, our goal is to have forms completed with 72 hours, however, some forms may require a visit or additional information.    Type of letter, form or note:  medical    Who is the form from?: [facility] needs signature (if other please explain)    Where did the form come from: form was faxed in    What clinic location was the form placed at?: [facility] [phone]    Where the form was placed: Team C Box/Folder    What number is listed as a contact on the form?: [phone]       Additional comments: Please sign and fax to [phone]    Call taken on 10/27/2021 at 8:27 AM by Martha Fletcher

## 2021-11-01 NOTE — TELEPHONE ENCOUNTER
Reason for Call:  Form, our goal is to have forms completed with 72 hours, however, some forms may require a visit or additional information.    Type of letter, form or note:  medical    Who is the form from?: [facility] needs signature (if other please explain)    Where did the form come from: form was faxed in    What clinic location was the form placed at?: [facility] [phone]    Where the form was placed: Team C Box/Folder    What number is listed as a contact on the form?: [phone]       Additional comments: Please sign and fax to [phone]    Call taken on 11/1/2021 at 8:15 AM by Martha Fletcher

## 2021-11-02 NOTE — TELEPHONE ENCOUNTER
Reason for Call:  Form, our goal is to have forms completed with 72 hours, however, some forms may require a visit or additional information.    Type of letter, form or note:  orders    [facility]    Where did the form come from: form was faxed in    What clinic location was the form placed at?: [facility] [phone]    Where the form was placed: Team C Box/Folder    What number is listed as a contact on the form?:  [phone]       Additional comments:   Fax [phone]    Call taken on 11/2/2021 at 7:16 AM by Yasmine Cedeño

## 2021-11-03 NOTE — TELEPHONE ENCOUNTER
[facility] care nursing Yohana calling for verbal orders  Skilled nursing 2x week for 2 weeks  1x week for 1 week and 3 prns    Call yohana back at   [phone]

## 2021-11-04 NOTE — TELEPHONE ENCOUNTER
Reason for Call:  Form, our goal is to have forms completed with 72 hours, however, some forms may require a visit or additional information.    Type of letter, form or note:  medical    Who is the form from?: Home care    Where did the form come from: form was faxed in    What clinic location was the form placed at?: [facility] [phone]    Where the form was placed: Team C form bin    What number is listed as a contact on the form?: fax [phone]       Additional comments: Please complete and fax    Call taken on 11/4/2021 at 7:12 AM by Lashell Tan

## 2021-11-08 NOTE — TELEPHONE ENCOUNTER
Pending Prescriptions:                       Disp   Refills    morphine (MS CONTIN) 15 MG CR tablet [Phar*60 tab*0        Sig: Take 15 mg by mouth every 12 hours    oxyCODONE (ROXICODONE) 5 MG tablet [Pharma*60 tab*0        Sig: Take 1 tablet (5 mg) by mouth every 4 hours as needed           for severe pain    Routing refill request to provider for review/approval because:  Drug not on the [facility] refill protocol

## 2021-11-11 NOTE — TELEPHONE ENCOUNTER
Reason for Call:  Form, our goal is to have forms completed with 72 hours, however, some forms may require a visit or additional information.    Type of letter, form or note:  medical    Who is the form from?: Home care    Where did the form come from: form was faxed in    What clinic location was the form placed at?: [facility] [phone]    Where the form was placed: Team C Box/Folder    What number is listed as a contact on the form?: [phone]       Additional comments: Please complete form and return to [phone]    Call taken on 11/11/2021 at 8:23 AM by Flora Burton

## 2021-11-15 NOTE — TELEPHONE ENCOUNTER
Yohana from [facility] calling requesting Rx be sent for patient.     States she is having some redness under the breast.    No infection and no open sores, just precautionary reasons.     nystatin (NYSTOP) [id number] UNIT/GM POWD    Pharmacy: [facility]    Routing to provider-    KWAME Thor[facility]

## 2021-11-15 NOTE — TELEPHONE ENCOUNTER
Reason for Call:  Form, our goal is to have forms completed with 72 hours, however, some forms may require a visit or additional information.    Type of letter, form or note:  medical    Who is the form from?: [facility] needs signature (if other please explain)    Where did the form come from: form was faxed in    What clinic location was the form placed at?: [facility] [phone]    Where the form was placed: Team C Box/Folder    What number is listed as a contact on the form?: [phone]       Additional comments: Please sign and fax to [phone]    Call taken on 11/15/2021 at 8:52 AM by Martha Fletcher

## 2021-11-23 NOTE — TELEPHONE ENCOUNTER
Reason for Call:  Form, our goal is to have forms completed with 72 hours, however, some forms may require a visit or additional information.    Type of letter, form or note:  medical    Who is the form from?: Home care    Where did the form come from: form was faxed in    What clinic location was the form placed at?: [facility] [phone]    Where the form was placed: Team C Box/Folder    What number is listed as a contact on the form?: none       Additional comments: Please complete form and return to [phone]    Call taken on 11/23/2021 at 7:28 AM by Flora Burton

## 2021-11-24 NOTE — TELEPHONE ENCOUNTER
calling to see if patient can wait to give UA sample until tomorrow.    States she is bedridden and it would take an ambulance ride to get her to hospital to give sample.     Otherwise home care nurse will be over tomorrow 11/25 and wants to know if she can give the sample then.     Huddled with RK:    States yes this is ok.    Patient's  notified.     KWAME Thorpe/[facility]

## 2021-11-24 NOTE — TELEPHONE ENCOUNTER
PCP or covering provider please advise.     Juanita PT from [facility] to request a UA to be collected in the home today or tomorrow.   The patient passed urine that was red in color about 20 minutes ago. The patient denies having any dysuria or other symptoms. Per Juanita vitals were normal.     Please advise then have team contact Finn, patient's  with plan.   PHONE: [phone].       DAMIAN Moya, RN, PHN  [facility]  November 24, 2021

## 2021-11-25 NOTE — TELEPHONE ENCOUNTER
Home care nurse with [facility] is calling and say patient has a positive UA/UC. Patient is positive for Pseudomonas, enterobacteria complex Cloacae complex, [place] group, +protein >30 + nitrites, moder leukocytes, > 100 red blood cells, 11-25 moderate bacteria.  Triage guidelines recommend to call pcp now. Triager paged on call provider Dr. Hearn, and updated regarding patient UA/UC. Provider ordered Keflex 500mg 1 tablet twice daily x 7 days. Triager called out and update home care nurse Melia. She says the family will  the medication on 11/26/21 at the  pharmacy in [facility]. Home care nurse says patient is asymptomatic at this time.  COVID 19 Nurse Triage Plan/Patient Instructions    Please be aware that novel coronavirus (COVID-19) may be circulating in the community. If you develop symptoms such as fever, cough, or SOB or if you have concerns about the presence of another infection including coronavirus (COVID-19), please contact your health care provider or visit [email or web address].     Disposition/Instructions    Home care recommended. Follow home care protocol based instructions.    Thank you for taking steps to prevent the spread of this virus.  o Limit your contact with others.  o Wear a simple mask to cover your cough.  o Wash your hands well and often.    Resources    [facility]: About COVID-19: [email or web address]    CDC: What to Do If You're Sick: www.cdc.gov/coronavirus/2019-ncov/about/steps-when-sick.html    CDC: Ending Home Isolation: www.cdc.gov/coronavirus/2019-ncov/hcp/disposition-in-home-patients.html     CDC: Caring for Someone: www.cdc.gov/coronavirus/2019-ncov/if-you-are-sick/care-for-someone.html     [facility]: Interim Guidance for Hospital Discharge to Home: www.health.[facility].mn.us/diseases/coronavirus/hcp/hospdischarge.pdf    [facility] clinical trials (COVID-19 research studies): [email or web address]      Below are the COVID-19 hotlines at the [place] Department of Health ([facility]). Interpreters are available.   o For health questions: Call 152-979-2288 or 1-784.964.6818 (7 a.m. to 7 p.m.)  o For questions about schools and childcare: Call 015-215-9520 or 1-994.229.1994 (7 a.m. to 7 p.m.)                       Reason for Disposition    Lab result questions    [1] Caller requests to speak ONLY to PCP AND [2] NON-URGENT question    Additional Information    Negative: [1] Caller is not with the adult (patient) AND [2] reporting urgent symptoms    Negative: Medication questions    Negative: Caller can't be reached by phone    Negative: Caller has already spoken to PCP or another triager    Negative: RN needs further essential information from caller in order to complete triage    Negative: Requesting regular office appointment    Negative: [1] Caller requesting NON-URGENT health information AND [2] PCP's office is the best resource    Negative: Lab calling with strep throat test results and triager can call in prescription    Negative: Lab calling with urinalysis test results and triager can call in prescription    Negative: Medication questions    Negative: ED call to PCP    Negative: Physician call to PCP    Negative: Call about patient who is currently hospitalized    Negative: Lab or radiology calling with CRITICAL test results    Negative: [1] Prescription not at pharmacy AND [2] was prescribed today by PCP    Negative: [1] Follow-up call from patient regarding patient's clinical status AND [2] information urgent    Negative: [1] Caller requests to speak ONLY to PCP AND [2] urgent question    Negative: [1] Caller requests to speak to PCP now AND [2] won't tell us reason for call  (Exception: if 10 pm to 6 am, caller must first discuss reason for the call)    Negative: Notification of hospital admission    Negative: Notification of death    Negative: Caller requesting lab results    Negative: Lab or radiology  calling with test results    Negative: [1] Follow-up call from patient regarding patient's clinical status AND [2] information NON-URGENT    Negative: Caller requesting an appointment, triage offered and declined    Protocols used: INFORMATION ONLY CALL - NO TRIAGE-A-AH, PCP CALL - NO TRIAGE-A-AH

## 2021-11-26 NOTE — TELEPHONE ENCOUNTER
Finn  calling and states that she is allergy to the cephalexin.  Nausea and vomiting back in 2018.  This is not on her allergy list.  Will add.    Can you send an alternative?    Will use [facility] [address] in [place].

## 2021-11-26 NOTE — TELEPHONE ENCOUNTER
Reason for Call:  Form, our goal is to have forms completed with 72 hours, however, some forms may require a visit or additional information.    Type of letter, form or note:  medical    Who is the form from?: [facility] needs signature (if other please explain)    Where did the form come from: form was faxed in    What clinic location was the form placed at?: [facility] [phone]    Where the form was placed: Team C Box/Folder    What number is listed as a contact on the form?: [phone]       Additional comments: Please sign and fax to [phone]    Call taken on 11/26/2021 at 8:28 AM by Martha Fletcher

## 2021-11-27 NOTE — TELEPHONE ENCOUNTER
Pt's  Kishan upset, Cipro prescription sent to  Pharmacy despite alternative request.  pharmacy closed now til Monday. Kishan requests resend to [facility].    Rx resent. Message to clinic f/u on Monday with canceling at  pharmacy.

## 2021-11-29 NOTE — TELEPHONE ENCOUNTER
Reason for Call:  Form, our goal is to have forms completed with 72 hours, however, some forms may require a visit or additional information.    Type of letter, form or note:  medical    Who is the form from?: Home care    Where did the form come from: form was faxed in    What clinic location was the form placed at?: [facility] [phone]    Where the form was placed: Team C Box/Folder    What number is listed as a contact on the form?: none       Additional comments: Please complete form and return to [phone]    Call taken on 11/29/2021 at 9:11 AM by Flora Burton

## 2021-12-13 NOTE — TELEPHONE ENCOUNTER
Patients daughter calling to request referral for her mom for [facility].  She stated they do in home massages. Their phone is [phone].  Please call with additional questions, ok to leave message.

## 2021-12-14 NOTE — TELEPHONE ENCOUNTER
Spoke to spouse, Katherin is in the hospital, they will call when she is out of the hospital to schedule the video visit

## 2021-12-14 NOTE — TELEPHONE ENCOUNTER
Will need video visit to count for face to face visit for Medicare to discuss massage therapy orders.

## 2021-12-17 NOTE — TELEPHONE ENCOUNTER
Reason for Call:  Form, our goal is to have forms completed with 72 hours, however, some forms may require a visit or additional information.    Type of letter, form or note:  medical    Who is the form from?: Home care    Where did the form come from: form was faxed in    What clinic location was the form placed at?: [facility] [phone]    Where the form was placed: Team C form bin    What number is listed as a contact on the form?: fax [phone]       Additional comments: Please complete and fax    Call taken on 12/17/2021 at 7:04 AM by Lashell Tan

## 2021-12-24 NOTE — TELEPHONE ENCOUNTER
Reason for Call:  Form, our goal is to have forms completed with 72 hours, however, some forms may require a visit or additional information.    Type of letter, form or note:  medical    Who is the form from?: Home care    Where did the form come from: form was faxed in    What clinic location was the form placed at?: [facility] [phone]    Where the form was placed: tEAM c Box/Folder    What number is listed as a contact on the form?: none       Additional comments: Please complete form and return to [phone]    -Call taken on 12/24/2021 at 6:46 AM by Flora Burton      -

## 2021-12-27 NOTE — TELEPHONE ENCOUNTER
Reason for Call:  Form, our goal is to have forms completed with 72 hours, however, some forms may require a visit or additional information.    Type of letter, form or note:  medical    Who is the form from?: Home care    Where did the form come from: form was faxed in    What clinic location was the form placed at?: [facility] [phone]    Where the form was placed: Team C Box/Folder    What number is listed as a contact on the form?: none       Additional comments: Please complete form and return to [phone]    Call taken on 12/27/2021 at 7:37 AM by Flora Burton

## 2021-12-28 NOTE — TELEPHONE ENCOUNTER
Pt requesting refills on her hydroxyzine pamoate 25mg. Tried to ask Dr Ferreira but he declined and said send to primary

## 2021-12-28 NOTE — TELEPHONE ENCOUNTER
Reason for Call: Request for an order or referral:    Order or referral being requested: nursing 1 visit x 2 days for 2 days, 2 visits a week x 2 weeks, 1 visit a week x 2 weeks, 1 to 6 PRN viits, home health aid 2 visists a week x 4 weeks, 2 visists x 4 days for 4 days, PT and OT 1 visit x10 days for 10 days.    Date needed: start date 12/24    Has the patient been seen by the PCP for this problem? YES    Additional comments:     Phone number Patient can be reached at:  Other phone number:  [phone]    Best Time:  any    Can we leave a detailed message on this number?  YES    Call taken on 12/28/2021 at 9:23 AM by Grace Arzate

## 2021-12-29 NOTE — TELEPHONE ENCOUNTER
I don't see this medication anywhere in her chart and I don't know who Dr. Ferreira is.  We'll have to discuss this at her appointment on Friday.

## 2021-12-29 NOTE — TELEPHONE ENCOUNTER
Reason for Call:  Form, our goal is to have forms completed with 72 hours, however, some forms may require a visit or additional information.    Type of letter, form or note:  orders    Who is the form from?: [facility] (if other please explain)    Where did the form come from: form was faxed in    What clinic location was the form placed at?: [facility] [phone]    Where the form was placed: Team C Box/Folder    What number is listed as a contact on the form?:  [phone]       Additional comments:  Fax [phone]    Call taken on 12/29/2021 at 7:14 AM by Yasmine Cedeño

## 2021-12-30 NOTE — TELEPHONE ENCOUNTER
Call from Yohana with [facility] with an update about patient    Patient has been having increased anxiety since discharge from the hospital. It sounds like patient was anxious at baseline, but it has increased since surgery and patient is not sleeping well.  Patient was given atarax 25mg while in the hospital and they were wondering if this could be prescribed for at home to help patient sleep through the night    Patient has virtual hospital follow up appointment tomorrow 12/31 at 3:40pm.     Thanks,  Kirsten Garcia BSN, RN

## 2021-12-30 NOTE — TELEPHONE ENCOUNTER
Reason for Call:  Form, our goal is to have forms completed with 72 hours, however, some forms may require a visit or additional information.    Type of letter, form or note:  medical    Who is the form from?: Home care    Where did the form come from: form was faxed in    What clinic location was the form placed at?: [facility] [phone]    Where the form was placed: Team C Box/Folder    What number is listed as a contact on the form?: [phone]       Additional comments: Fax: [phone]    Call taken on 12/30/2021 at 11:51 AM by Yoselin Whipple

## 2021-12-31 NOTE — TELEPHONE ENCOUNTER
Medication is not active on patients list. Patient has an appointment today for hospital follow up.     Will wait to see if provider orders this medication    Kirsten MUIRN, RN

## 2021-12-31 NOTE — TELEPHONE ENCOUNTER
Patient is currently in the hospital.  I have not prescribed this medication for her.  Will await to see what she is discharged on from the hospital.

## 2021-12-31 NOTE — TELEPHONE ENCOUNTER
FYI:   Refill request came in today for hydroxyzine 25mg. Med not active on patients list.     Looks like patient was admitted to [facility] yesterday - not sure if you want to fill this rx or wait til discharged.     Thanks,  Kirsten MUIRN, RN

## 2021-12-31 NOTE — TELEPHONE ENCOUNTER
Yoselin form [facility] calling to say that Katherin was discharged form the hospital and would like verbal orders to continue OT  2x a week for 2 weeks and 1 x a week for 1 week to strengthen tolerance with ADL , toilet safety, and upper extremity home exercise program    You can reach Yoselin at [phone] and okay to johnathan if needed

## 2022-01-01 ENCOUNTER — TELEPHONE (OUTPATIENT)
Dept: FAMILY MEDICINE | Facility: OTHER | Age: 82
End: 2022-01-01
Payer: COMMERCIAL

## 2022-01-01 ENCOUNTER — TRANSFERRED RECORDS (OUTPATIENT)
Dept: HEALTH INFORMATION MANAGEMENT | Facility: CLINIC | Age: 82
End: 2022-01-01
Payer: COMMERCIAL

## 2022-01-01 ENCOUNTER — MEDICAL CORRESPONDENCE (OUTPATIENT)
Dept: HEALTH INFORMATION MANAGEMENT | Facility: CLINIC | Age: 82
End: 2022-01-01
Payer: COMMERCIAL

## 2022-01-01 ENCOUNTER — TELEPHONE (OUTPATIENT)
Dept: FAMILY MEDICINE | Facility: OTHER | Age: 82
End: 2022-01-01

## 2022-01-01 ENCOUNTER — MEDICAL CORRESPONDENCE (OUTPATIENT)
Dept: HEALTH INFORMATION MANAGEMENT | Facility: CLINIC | Age: 82
End: 2022-01-01

## 2022-01-01 DIAGNOSIS — E87.6 HYPOKALEMIA: Primary | ICD-10-CM

## 2022-01-01 DIAGNOSIS — G89.29 OTHER CHRONIC PAIN: ICD-10-CM

## 2022-01-01 DIAGNOSIS — I50.32 CHRONIC DIASTOLIC CONGESTIVE HEART FAILURE (H): Primary | ICD-10-CM

## 2022-01-01 LAB
CREATININE (EXTERNAL): 1.37 MG/DL (ref 0.57–1.11)
GFR ESTIMATED (EXTERNAL): 37 ML/MIN/1.73M2
GFR ESTIMATED (IF AFRICAN AMERICAN) (EXTERNAL): 45 ML/MIN/1.73M2
GLUCOSE (EXTERNAL): 149 MG/DL (ref 65–100)
POTASSIUM (EXTERNAL): 4.3 MMOL/L (ref 3.5–5)

## 2022-01-01 RX ORDER — OXYCODONE HYDROCHLORIDE 5 MG/1
5 TABLET ORAL EVERY 4 HOURS PRN
Qty: 60 TABLET | Refills: 0 | Status: SHIPPED | OUTPATIENT
Start: 2022-01-01

## 2022-01-01 RX ORDER — MORPHINE SULFATE 15 MG/1
TABLET, FILM COATED, EXTENDED RELEASE ORAL
Qty: 60 TABLET | Refills: 0 | Status: SHIPPED | OUTPATIENT
Start: 2022-01-01

## 2022-01-01 RX ORDER — POTASSIUM CHLORIDE 750 MG/1
10 TABLET, EXTENDED RELEASE ORAL DAILY
Qty: 90 TABLET | Refills: 3 | Status: SHIPPED | OUTPATIENT
Start: 2022-01-01

## 2022-01-03 NOTE — TELEPHONE ENCOUNTER
Reason for Call:  Form, our goal is to have forms completed with 72 hours, however, some forms may require a visit or additional information.    Type of letter, form or note:  orders    Who is the form from?: [facility] (if other please explain)    Where did the form come from: form was faxed in    What clinic location was the form placed at?: [facility] [phone]    Where the form was placed: Team C Box/Folder    What number is listed as a contact on the form?:  [phone]       Additional comments:  Fax [phone]    Call taken on 1/3/2022 at 4:08 PM by Yasmine Cedeño

## 2022-01-05 NOTE — TELEPHONE ENCOUNTER
Reason for Call:  Form, our goal is to have forms completed with 72 hours, however, some forms may require a visit or additional information.    Type of letter, form or note:  medical    Who is the form from?: [facility] needs signature (if other please explain)    Where did the form come from: form was faxed in    What clinic location was the form placed at?: [facility] [phone]    Where the form was placed: Team C Box/Folder    What number is listed as a contact on the form?: [phone]       Additional comments: Please sign and fax to [phone]    Call taken on 1/5/2022 at 2:30 PM by Martha Fletcher

## 2022-01-06 NOTE — TELEPHONE ENCOUNTER
Patient  calling to request Vistaril for the patient.   Would like it sent to [facility].     Medication is not current on medication list. Routing to PCP to advise on.   Patient has a video visit with  next Tuesday.     Encounter was accidentally closed.     ISADORA MoyaN, RN, PHN  [place]  January 6, 2022

## 2022-01-06 NOTE — TELEPHONE ENCOUNTER
Reason for Call:  Form, our goal is to have forms completed with 72 hours, however, some forms may require a visit or additional information.    Type of letter, form or note:  medical    Who is the form from?: Home care    Where did the form come from: form was faxed in    What clinic location was the form placed at?: [facility] [phone]    Where the form was placed: Team C form bin    What number is listed as a contact on the form?: fax [phone]       Additional comments: Please complete and fax    Call taken on 1/6/2022 at 4:46 PM by Lashell Tan

## 2022-01-07 NOTE — TELEPHONE ENCOUNTER
Since I haven't prescribed it for her and it isn't on our medication list for her, I don't know who started prescribing it, why she is taking it, how much or how often.  All of this is best addressed at a visit, we can talk about it on Tuesday at her video visit.

## 2022-01-07 NOTE — TELEPHONE ENCOUNTER
Reason for Call:  Form, our goal is to have forms completed with 72 hours, however, some forms may require a visit or additional information.    Type of letter, form or note:  medical    Who is the form from?: Home care    Where did the form come from: form was faxed in    What clinic location was the form placed at?: [facility] [phone]    Where the form was placed: Team C Box/Folder    What number is listed as a contact on the form?: none       Additional comments: Please complete form and return to [phone]    Call taken on 1/7/2022 at 11:00 AM by Flora Burton

## 2022-01-08 NOTE — TELEPHONE ENCOUNTER
Reason for Call:  Form, our goal is to have forms completed with 72 hours, however, some forms may require a visit or additional information.    Type of letter, form or note:  medical    Who is the form from?: Home care    Where did the form come from: form was faxed in    What clinic location was the form placed at?: [facility] [phone]    Where the form was placed: Team C form bin    What number is listed as a contact on the form?: fax [phone]       Additional comments: Please complete and fax    Call taken on 1/8/2022 at 9:04 AM by Lashell Tan

## 2022-01-10 NOTE — TELEPHONE ENCOUNTER
Yohana PUENTE from [facility] calling to get verbal orders for Speech Pathology Darin.

## 2022-01-10 NOTE — TELEPHONE ENCOUNTER
Reason for Call:  Form, our goal is to have forms completed with 72 hours, however, some forms may require a visit or additional information.    Type of letter, form or note:  medical    Who is the form from?: Home care    Where did the form come from: form was faxed in    What clinic location was the form placed at?: [facility] [phone]    Where the form was placed: Team C form bin    What number is listed as a contact on the form?: fax [phone]       Additional comments: Please complete and fax    Call taken on 1/10/2022 at 2:01 PM by Lashell Tan

## 2022-01-11 PROBLEM — I50.30 HEART FAILURE WITH PRESERVED LEFT VENTRICULAR FUNCTION (HFPEF) (H): Status: RESOLVED | Noted: 2018-07-20 | Resolved: 2022-01-01

## 2022-01-11 PROBLEM — F11.20 OPIOID TYPE DEPENDENCE, CONTINUOUS (H): Status: ACTIVE | Noted: 2021-01-01

## 2022-01-11 PROBLEM — I71.8: Status: ACTIVE | Noted: 2021-01-01

## 2022-01-11 PROBLEM — R53.81 PHYSICAL DECONDITIONING: Status: RESOLVED | Noted: 2022-01-01 | Resolved: 2022-01-01

## 2022-01-11 PROBLEM — Z95.828 HISTORY OF ENDOVASCULAR STENT GRAFT FOR ABDOMINAL AORTIC ANEURYSM (AAA): Status: ACTIVE | Noted: 2021-01-01

## 2022-01-11 PROBLEM — R53.81 PHYSICAL DECONDITIONING: Status: ACTIVE | Noted: 2022-01-01

## 2022-01-11 NOTE — TELEPHONE ENCOUNTER
Reason for Call:  Form, our goal is to have forms completed with 72 hours, however, some forms may require a visit or additional information.    Type of letter, form or note:  orders    Who is the form from?: [facility] (if other please explain)    Where did the form come from: form was faxed in    What clinic location was the form placed at?: [facility] [phone]    Where the form was placed: Team C Box/Folder    What number is listed as a contact on the form?:  [phone]       Additional comments:  Fax [phone]    Call taken on 1/11/2022 at 7:42 AM by Yasmine Cedeño

## 2022-01-11 NOTE — TELEPHONE ENCOUNTER
Pending Prescriptions:                       Disp   Refills    oxyCODONE (ROXICODONE) 5 MG tablet [Pharma*60 tab*0        Sig: TAKE 1 TABLET (5 MG) BY MOUTH EVERY 4 HOURS AS NEEDED           FOR SEVERE PAIN    morphine (MS CONTIN) 15 MG CR tablet [Phar*60 tab*0        Sig: TAKE 1 TABLET (15 MG) BY MOUTH EVERY 12 HOURS        Routing refill request to provider for review/approval because:  Drug not on the [facility] refill protocol     ISADORA MoyaN, RN, PHN  [place]  January 11, 2022

## 2022-01-11 NOTE — TELEPHONE ENCOUNTER
Please fax basic metabolic panel order to [phone].  Form in my basket.    Patient also needs follow up scheduled in 1 month, OK for virtual (spoke to  today for her appointment)

## 2022-01-12 NOTE — TELEPHONE ENCOUNTER
Reason for Call:  Form, our goal is to have forms completed with 72 hours, however, some forms may require a visit or additional information.    Type of letter, form or note:  orders    Who is the form from?: [facility] (if other please explain)    Where did the form come from: form was faxed in    What clinic location was the form placed at?: [facility] [phone]    Where the form was placed: Team C Box/Folder    What number is listed as a contact on the form?:  [phone]       Additional comments:  Fax  [phone]    Call taken on 1/12/2022 at 7:13 AM by Yasmine Cedeño

## 2022-01-14 NOTE — TELEPHONE ENCOUNTER
Reason for Call:  Form, our goal is to have forms completed with 72 hours, however, some forms may require a visit or additional information.    Type of letter, form or note:  order    Who is the form from?: [facility] (if other please explain)    Where did the form come from: form was faxed in    What clinic location was the form placed at?: [facility] [phone]    Where the form was placed: Team C Box/Folder    What number is listed as a contact on the form?:  [phone]       Additional comments:  Fax [phone]    Call taken on 1/14/2022 at 7:17 AM by Yasmine Cedeño

## 2022-01-14 NOTE — TELEPHONE ENCOUNTER
"""Informed patient that their cataract is visually significant and meets the criteria for cataract "" Signed and placed in done forms basket.

## 2022-01-17 NOTE — TELEPHONE ENCOUNTER
Yohana from homecare asking for verbal order for      [phone] ok to leave message confidential voicemail

## 2022-01-17 NOTE — TELEPHONE ENCOUNTER
Reason for Call:  Form, our goal is to have forms completed with 72 hours, however, some forms may require a visit or additional information.    Type of letter, form or note:  medical    Who is the form from?: Home care    Where did the form come from: form was faxed in    What clinic location was the form placed at?: [facility] [phone]    Where the form was placed: Team C Box/Folder    What number is listed as a contact on the form?: [phone]       Additional comments: Fax: [phone]    Call taken on 1/17/2022 at 7:47 AM by Yoselin Whipple

## 2022-01-18 NOTE — TELEPHONE ENCOUNTER
Reason for Call:  Form, our goal is to have forms completed with 72 hours, however, some forms may require a visit or additional information.    Type of letter, form or note:  orders    Who is the form from?: [facility] (if other please explain)    Where did the form come from: form was faxed in    What clinic location was the form placed at?: [facility] [phone]    Where the form was placed: Team C Box/Folder    What number is listed as a contact on the form?:  [phone]       Additional comments:  Fax [phone]    Call taken on 1/18/2022 at 7:05 AM by Yasmine Cedeño

## 2022-01-20 NOTE — TELEPHONE ENCOUNTER
Need hospice order asap, would like to get them out in the home today   Can another provider in Silvia's absence approve this please     [facility]   [phone]

## 2022-01-20 NOTE — TELEPHONE ENCOUNTER
I revieewed JUAN JOSE's note from 1/11/2022 which did not mention anything about hospice. They can sure evaluate her for hospice. Please give a verbal ok. Also will route to PCP to review on return

## 2022-01-20 NOTE — TELEPHONE ENCOUNTER
jovita now asking for written order faxed to them saying admit to hospice stat including patients dx  They are trying to get a team out there to the home today    Fax to [phone]

## 2022-01-21 NOTE — TELEPHONE ENCOUNTER
Reason for Call:  Form, our goal is to have forms completed with 72 hours, however, some forms may require a visit or additional information.    Type of letter, form or note:  medical    Who is the form from?: [facility] needs signature (if other please explain)    Where did the form come from: form was faxed in    What clinic location was the form placed at?: [facility] [phone]    Where the form was placed: Team C Box/Folder    What number is listed as a contact on the form?: [phone]       Additional comments: Please sign and fax to [phone]    Call taken on 1/21/2022 at 6:56 AM by Martha Fletcher

## 2022-01-24 NOTE — TELEPHONE ENCOUNTER
Reason for Call:  Form, our goal is to have forms completed with 72 hours, however, some forms may require a visit or additional information.    Type of letter, form or note:  medical    Who is the form from?: Home care    Where did the form come from: form was faxed in    What clinic location was the form placed at?: [facility] [phone]    Where the form was placed: team c Box/Folder    What number is listed as a contact on the form?: [phone]       Additional comments: please review, sign, and return fax to [phone]    Call taken on 1/24/2022 at 9:05 AM by Grace Arzate

## 2022-02-01 ENCOUNTER — TELEPHONE (OUTPATIENT)
Dept: FAMILY MEDICINE | Facility: OTHER | Age: 82
End: 2022-02-01
Payer: COMMERCIAL

## 2022-02-01 NOTE — TELEPHONE ENCOUNTER
Form filed with [facility] and sent to Central Business Office to scan.

## 2022-02-11 ENCOUNTER — TELEPHONE (OUTPATIENT)
Dept: FAMILY MEDICINE | Facility: OTHER | Age: 82
End: 2022-02-11

## 2022-02-18 ENCOUNTER — TELEPHONE (OUTPATIENT)
Dept: FAMILY MEDICINE | Facility: OTHER | Age: 82
End: 2022-02-18
Payer: COMMERCIAL

## 2022-02-18 ENCOUNTER — MEDICAL CORRESPONDENCE (OUTPATIENT)
Dept: HEALTH INFORMATION MANAGEMENT | Facility: CLINIC | Age: 82
End: 2022-02-18
Payer: COMMERCIAL

## 2022-02-18 NOTE — TELEPHONE ENCOUNTER
Reason for Call:  Form, our goal is to have forms completed with 72 hours, however, some forms may require a visit or additional information.    Type of letter, form or note:  medical    Who is the form from?: [facility] needs signature (if other please explain)    Where did the form come from: form was faxed in    What clinic location was the form placed at?: [facility] [phone]    Where the form was placed: Team C Box/Folder    What number is listed as a contact on the form?: [phone]       Additional comments: Please sign and fax to [phone]    Call taken on 2/18/2022 at 1:21 PM by Martha Fletcher

## 2022-11-05 NOTE — TELEPHONE ENCOUNTER
Called Yohana [phone] and left message stated below  
Home care nurse is requesting a couple of new prescriptions.    1) Zofran is not working for her nausea, even with 3 pills.    They hoping for a different medication.    She would also like Senna-docusate .    2) She is currently alternating between seen and docusate and she is still not getting regular bowels.    They are hoping to try this one.    Please advise.    Vero Allen RN on 10/12/2021 at 10:17 AM    
TC patient. Sent Compazine to try instead. Senna-docusate sent as well.    Koby Scott-SKYLAR Hampton  [facility]     
soft

## 2023-02-23 ENCOUNTER — TELEPHONE (OUTPATIENT)
Dept: FAMILY MEDICINE | Facility: OTHER | Age: 83
End: 2023-02-23
Payer: COMMERCIAL

## 2023-02-23 NOTE — TELEPHONE ENCOUNTER
Forms/Letter Request    Type of form/letter: orders    Have you been seen for this request: N/A    Do we have the form/letter: Yes: FP form bin    When is form/letter needed by: n/8a    How would you like the form/letter returned: Fax    Patient Notified form requests are processed in 3-5 business days:No    Fax [phone]

## 2023-02-24 DIAGNOSIS — Z53.9 DIAGNOSIS NOT YET DEFINED: Primary | ICD-10-CM

## 2023-02-27 NOTE — TELEPHONE ENCOUNTER
Signed forms faxed back to [facility] at [phone].     Forms placed in pod bin for scanning.

## 2024-06-27 NOTE — TELEPHONE ENCOUNTER
General Surgery Office Visit   History and Physical    Patient Name: Wero Spangler  YOB: 1953 (70 y.o.)  MRN: [id number]  Today's Date: 06/27/2024    Referring Md:   No referring provider defined for this encounter.    SUBJECTIVE:     Chief Complaint: R inguinal hernia    History of Present Illness:  Wero Spangler is a 70 y.o. male with PMHx of  BPH, RCC, HLD, HTN, GERD,  who presents to the clinic today complaining of R inguinal hernia which he first noticed 10 days ago. He reports he has been straining with stool. He reports discomfort and feeling of fullness in R inguinal region. He also reports that occasionally the area becomes hard and tense, and this returns to normal. He notices a bulge in the area. Denies skin changes or erythema. Denies any similar symptoms on L side.     He denies fever, chills, unintentional weight loss, n/v/d, constipation, hematochezia, dysuria, hematuria, CP, SOB, and all other symptoms. Patient reports being compliant with home medication regimen.     Hx of MI in September.   Previous abdominal surgeries include: lap nephrectomy   Denies alcohol, tobacco, and elicit drug use.   Not currently on any anticoagulants    Interval hx 6/27/24:  Mr. Spangler presents today after his hernia surgery was canceled due to needing to be on DAPT due to recent MI. He met with his cardiologist and underwent an echo which was fine. Her cardiologist is ok with stopping his DAPT (Brillinta) prior to surgery. He would like to undergo his repair in July because of a planned trip in August. He states his hernia is getting bigger but is still reducible.     Review of patient's allergies indicates:  No Known Allergies    Past Medical History:   Diagnosis Date    Aftercataract     Allergy     BPH (benign prostatic hyperplasia)     Cataract     Corneal dystrophy     Elevated PSA     Enlarged prostate     Fatty liver     Fuchs' corneal dystrophy     Gallstones     General anesthetics causing  faxed   adverse effect in therapeutic use     delayed emergence after back surgery    GERD (gastroesophageal reflux disease)     HTN (hypertension) 2013    Hypertension     Insomnia     Prostate nodule     Urinary tract infection      Past Surgical History:   Procedure Laterality Date    BACK SURGERY  2000    CATARACT EXTRACTION W/  INTRAOCULAR LENS IMPLANT      Both Eyes    CORNEAL TRANSPLANT Right 2019    Procedure: TRANSPLANT, CORNEA;  Surgeon: Vu Wilson MD;  Location: [facility];  Service: Ophthalmology;  Laterality: Right;  DMEK    EYE SURGERY      LAPAROSCOPIC MARSUPIALIZATION OF CYST OF KIDNEY      PROSTATE SURGERY      prostate biopsy benign    TONSILLECTOMY      VASECTOMY       Family History   Problem Relation Name Age of Onset    Cancer Mother          breast    Prostate cancer Brother prostate     Cancer Brother prostate         prostate    Macular degeneration Maternal Grandmother      Cancer Other      Cancer Other      Amblyopia Neg Hx      Blindness Neg Hx      Cataracts Neg Hx      Glaucoma Neg Hx      Retinal detachment Neg Hx      Strabismus Neg Hx       Social History     Tobacco Use    Smoking status: Former     Current packs/day: 0.00     Types: Cigarettes     Quit date:      Years since quittin.5    Smokeless tobacco: Never   Substance Use Topics    Alcohol use: Not Currently    Drug use: No        Review of Systems:  Review of Systems   Constitutional:  Negative for chills, fever and weight loss.   Respiratory:  Negative for cough and shortness of breath.    Cardiovascular:  Negative for chest pain and palpitations.   Gastrointestinal:  Negative for abdominal pain, constipation, diarrhea, nausea and vomiting.   Genitourinary:  Negative for dysuria, frequency and urgency.   Endo/Heme/Allergies:  Does not bruise/bleed easily.       OBJECTIVE:     Vital Signs (Most Recent)  /63 (BP Location: Left arm, Patient Position: Sitting, BP Method: Medium (Automatic))   Pulse  (!) 53   Ht 6' (1.829 m)   Wt 80.8 kg (178 lb 2.1 oz)   SpO2 97%   BMI 24.16 kg/m²     Physical Exam  Constitutional:       Appearance: Normal appearance.   Cardiovascular:      Rate and Rhythm: Normal rate and regular rhythm.   Pulmonary:      Effort: Pulmonary effort is normal.   Abdominal:      General: Abdomen is flat.      Palpations: Abdomen is soft.      Hernia: A hernia (R inguinal) is present.   Skin:     General: Skin is warm and dry.   Neurological:      General: No focal deficit present.      Mental Status: He is alert and oriented to person, place, and time.           Labs:     Lab Results   Component Value Date    WBC 6.59 05/12/2024    HGB 14.3 05/12/2024    HCT 41.6 05/12/2024    MCV 93 05/12/2024     05/12/2024         CMP  Sodium   Date Value Ref Range Status   05/12/2024 140 136 - 145 mmol/L Final     Potassium   Date Value Ref Range Status   05/12/2024 3.6 3.5 - 5.1 mmol/L Final     Chloride   Date Value Ref Range Status   05/12/2024 104 95 - 110 mmol/L Final     CO2   Date Value Ref Range Status   05/12/2024 26 23 - 29 mmol/L Final     Glucose   Date Value Ref Range Status   05/12/2024 84 70 - 110 mg/dL Final     BUN   Date Value Ref Range Status   05/12/2024 16 8 - 23 mg/dL Final     Creatinine   Date Value Ref Range Status   05/12/2024 0.9 0.5 - 1.4 mg/dL Final     Calcium   Date Value Ref Range Status   05/12/2024 9.3 8.7 - 10.5 mg/dL Final     Total Protein   Date Value Ref Range Status   05/12/2024 6.6 6.0 - 8.4 g/dL Final     Albumin   Date Value Ref Range Status   05/12/2024 3.9 3.5 - 5.2 g/dL Final     Total Bilirubin   Date Value Ref Range Status   05/12/2024 1.3 (H) 0.1 - 1.0 mg/dL Final     Comment:     For infants and newborns, interpretation of results should be based  on gestational age, weight and in agreement with clinical  observations.    Premature Infant recommended reference ranges:  Up to 24 hours.............<8.0 mg/dL  Up to 48 hours............<12.0 mg/dL  3-5  days..................<15.0 mg/dL  6-29 days.................<15.0 mg/dL       Alkaline Phosphatase   Date Value Ref Range Status   05/12/2024 65 55 - 135 U/L Final     AST   Date Value Ref Range Status   05/12/2024 25 10 - 40 U/L Final     ALT   Date Value Ref Range Status   05/12/2024 19 10 - 44 U/L Final     Anion Gap   Date Value Ref Range Status   05/12/2024 10 8 - 16 mmol/L Final     eGFR if    Date Value Ref Range Status   07/25/2022 >60.0 >60 mL/min/1.73 m^2 Final     eGFR if non    Date Value Ref Range Status   07/25/2022 >60.0 >60 mL/min/1.73 m^2 Final     Comment:     Calculation used to obtain the estimated glomerular filtration  rate (eGFR) is the CKD-EPI equation.              Imaging:   US 5/13: 1cm hernia, fat containing.      ASSESSMENT/PLAN:     There are no diagnoses linked to this encounter.      Wero Spangler is a 70 y.o. male with reducible R inguinal hernia. We discussed surgery prior or after his planned trip. He elected to undergo surgery prior if it can be done in July.     - He will need to stop his Brillinta 7 days prior to surgery.   - Schedule for open R inguinal hernia repair in July.   - Patient instructed to call clinic with any questions, concerns, or new symptoms  - Patient understands and in agreement with plan; all questions answered    Martine Jimenez MD  General Surgery PGY2